# Patient Record
Sex: FEMALE | Race: BLACK OR AFRICAN AMERICAN | NOT HISPANIC OR LATINO | Employment: STUDENT | URBAN - METROPOLITAN AREA
[De-identification: names, ages, dates, MRNs, and addresses within clinical notes are randomized per-mention and may not be internally consistent; named-entity substitution may affect disease eponyms.]

---
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
## 2021-06-08 ENCOUNTER — OFFICE VISIT (OUTPATIENT)
Dept: OBSTETRICS AND GYNECOLOGY | Facility: CLINIC | Age: 21
End: 2021-06-08

## 2021-06-08 VITALS
WEIGHT: 171.8 LBS | HEIGHT: 62 IN | SYSTOLIC BLOOD PRESSURE: 116 MMHG | BODY MASS INDEX: 31.62 KG/M2 | DIASTOLIC BLOOD PRESSURE: 82 MMHG

## 2021-06-08 DIAGNOSIS — N92.6 IRREGULAR PERIODS: Primary | ICD-10-CM

## 2021-06-08 PROCEDURE — 99213 OFFICE O/P EST LOW 20 MIN: CPT | Performed by: OBSTETRICS & GYNECOLOGY

## 2021-06-08 RX ORDER — CLINDAMYCIN PHOSPHATE 20 MG/G
CREAM VAGINAL
COMMUNITY
Start: 2021-06-01 | End: 2021-08-30

## 2021-06-08 RX ORDER — ETHINYL ESTRADIOL AND LEVONORGESTREL 150-30(84)
1 KIT ORAL DAILY
COMMUNITY
Start: 2021-04-09 | End: 2021-08-30

## 2021-06-08 NOTE — PATIENT INSTRUCTIONS
Metrorrhagia  Metrorrhagia is bleeding from the uterus that happens irregularly but often. The bleeding generally happens between menstrual periods.  Follow these instructions at home:  Pay attention to any changes in your symptoms. Let your health care provider know about them. Follow these instructions to help with your condition:  Eating and drinking    · Eat well-balanced meals. Include foods that are high in iron, such as liver, meat, shellfish, green leafy vegetables, and eggs.  · If you become constipated:  ? Drink plenty of water. Drink enough to keep your urine pale yellow.  ? Take over-the-counter or prescription medicines.  ? Eat foods that are high in fiber, such as beans, whole grains, and fresh fruits and vegetables.  ? Limit foods that are high in fat and processed sugars, such as fried or sweet foods.  Medicines  · Take over-the-counter and prescription medicines only as told by your health care provider.  · Do not change medicines without talking with your health care provider.  · Aspirin or medicines that contain aspirin may make the bleeding worse. Do not take these medicines:  ? During your period.  ? During the week before your period.  · If you were prescribed iron pills, take them as told by your health care provider. Iron pills help to replace iron that your body loses because of this condition.  Activity  · If you need to change your sanitary pad or tampon more than one time every 2 hours:  ? Lie in bed with your feet raised (elevated).  ? Place a cold pack on your lower abdomen.  ? Rest as much as possible until the bleeding stops or slows down.  General instructions    · For 2 months, write down:  ? When your period starts.  ? When your period ends.  ? When any abnormal bleeding occurs.  ? What problems you notice.  · Keep all follow-up visits as told by your health care provider. This is important.  Contact a health care provider if:  · You get light-headed or weak.  · You have nausea and  vomiting.  · You cannot eat or drink without vomiting.  · You feel dizzy or have diarrhea while you are taking medicine.  · Have questions about birth control.  Get help right away if:  · You develop a fever or chills.  · You need to change your sanitary pad or tampon more than one time per hour.  · Your bleeding becomes heavy.  · Your flow contains clots.  · You develop pain in your abdomen.  · You lose consciousness.  · You develop a rash.  Summary  · Metrorrhagia is bleeding from the uterus that happens irregularly but often, usually between menstrual periods.  · Pay attention to any changes in your symptoms. Let your health care provider know about them.  · Eat well-balanced meals. Include foods that are high in iron, such as liver, meat, shellfish, green leafy vegetables, and eggs.  · Get help right away if you develop a fever, you see clots in your blood, your bleeding becomes heavy, you develop a rash, or you lose consciousness.  This information is not intended to replace advice given to you by your health care provider. Make sure you discuss any questions you have with your health care provider.  Document Revised: 06/20/2019 Document Reviewed: 06/20/2019  [email or web address] Patient Education © 2021 [employer]

## 2021-06-08 NOTE — PROGRESS NOTES
.  Chief Complaint   Patient presents with   • Pelvic Pain   • abnormal uterine bleeding         Subjective   HPI  Lexy Ortiz is a 21 y.o. female, , who presents with evaluation of pelvic pain and abnormal uterine bleeding. Patient states she has been cramping and been bleeding for over 2 months. She states she saw her primary care doctor and she put her on a new birth control pill to help.    She states she has acute pelvic pain.  She states she has experienced this problem for 2 duration: months.  She describes the severity as moderate.  She rates her pain score as a 8/10.  She states that the problem is worsening.  She states the pain is located in the low pelvic pain  and it does not radiate.  Patient notes aggravating factors include nothing and alleviating factors are nothing. OPatient has tried warm baths and ibuprofen states no relief The patient reports additional symptoms as gyn prob: moodiness and headaches and fatigue.Patiebnt reports she has been having a lot of nausea and loss of appetite since this began. She states she can eat but only a little.  The patient has not } previously been evaluated for pelvic pain.    Her last LMP was No LMP recorded (lmp unknown).. Patient states she can not recall when her last actual period was  Periods are irregular, lasting 7 days.  Dysmenorrhea:moderate, occurring throughout menses.  Partner Status: Marital Status: single.  New Partners since last visit: YES/NO/Other: no.  Desires STD Screening: YES/NO/Other: no.   Patient states she is changing her pad or tampon every 2 hours states the bleeding is heavy and it is not clotty.  Additional OB/GYN History   Last Pap :   Last Completed Pap Smear     This patient has no relevant Health Maintenance data.        History of abnormal Pap smear: no  Exercises Regularly: yes - .  Feelings of Anxiety or Depression: no  Tobacco Usage?: No   OB History        0    Para   0    Term   0       0    AB   0     "Living   0       SAB   0    TAB   0    Ectopic   0    Molar   0    Multiple   0    Live Births   0                The additional following portions of the patient's history were reviewed and updated as appropriate: allergies, current medications, past family history, past medical history, past social history, past surgical history and problem list.    Review of Systems   Constitutional: Negative.  Negative for activity change, appetite change, unexpected weight gain and unexpected weight loss.   HENT: Negative.    Eyes: Negative.  Negative for visual disturbance.   Respiratory: Negative.  Negative for cough and shortness of breath.    Cardiovascular: Negative.  Negative for chest pain and palpitations.   Gastrointestinal: Negative.  Negative for abdominal distention, abdominal pain, nausea and vomiting.   Endocrine: Negative.    Genitourinary: Positive for menstrual problem and pelvic pain. Negative for breast discharge, breast lump, breast pain and decreased libido.   Musculoskeletal: Negative.  Negative for back pain.   Allergic/Immunologic: Negative.    Neurological: Negative.  Negative for light-headedness.   Hematological: Negative.    Psychiatric/Behavioral: Negative.  Negative for agitation, behavioral problems, decreased concentration and depressed mood.       I have reviewed and agree with the HPI, ROS, and historical information as entered above. Jett Umanzor MD    Objective   /82   Ht 157.5 cm (62\")   Wt 77.9 kg (171 lb 12.8 oz)   LMP  (LMP Unknown)   Breastfeeding No   BMI 31.42 kg/m²     Physical Exam  Vitals reviewed.   Constitutional:       Appearance: Normal appearance. She is normal weight.   HENT:      Head: Normocephalic and atraumatic.   Cardiovascular:      Rate and Rhythm: Normal rate and regular rhythm.   Pulmonary:      Effort: Pulmonary effort is normal.      Breath sounds: Normal breath sounds.   Abdominal:      General: Abdomen is flat. Bowel sounds are normal.      " Palpations: Abdomen is soft.   Skin:     General: Skin is warm and dry.   Neurological:      Mental Status: She is alert and oriented to person, place, and time.   Psychiatric:         Mood and Affect: Mood normal.         Behavior: Behavior normal.         Assessment/Plan     Assessment     Problem List Items Addressed This Visit     None      Visit Diagnoses     Irregular periods    -  Primary    Relevant Orders    CBC (No Diff)    Hemoglobin A1c    TSH    Vitamin D 25 Hydroxy    Comprehensive Metabolic Panel    Luteinizing Hormone    Follicle Stimulating Hormone    US Non-ob Transvaginal            Plan     1. She has not tolerated several birth control pills over the last year. Will proceed with lab evaluation.   2. Will f/u in 2 weeks for US and to discuss other treatment options.       Jett Umanzor MD  06/08/2021

## 2021-06-09 LAB
25(OH)D3+25(OH)D2 SERPL-MCNC: 10.2 NG/ML (ref 30–100)
ALBUMIN SERPL-MCNC: 4.1 G/DL (ref 3.5–5.2)
ALBUMIN/GLOB SERPL: 1.5 G/DL
ALP SERPL-CCNC: 45 U/L (ref 39–117)
ALT SERPL-CCNC: 26 U/L (ref 1–33)
AST SERPL-CCNC: 23 U/L (ref 1–32)
BILIRUB SERPL-MCNC: 0.3 MG/DL (ref 0–1.2)
BUN SERPL-MCNC: 10 MG/DL (ref 6–20)
BUN/CREAT SERPL: 13.5 (ref 7–25)
CALCIUM SERPL-MCNC: 9 MG/DL (ref 8.6–10.5)
CHLORIDE SERPL-SCNC: 105 MMOL/L (ref 98–107)
CO2 SERPL-SCNC: 25.3 MMOL/L (ref 22–29)
CREAT SERPL-MCNC: 0.74 MG/DL (ref 0.57–1)
ERYTHROCYTE [DISTWIDTH] IN BLOOD BY AUTOMATED COUNT: 13.1 % (ref 12.3–15.4)
FSH SERPL-ACNC: 1.7 MIU/ML
GLOBULIN SER CALC-MCNC: 2.8 GM/DL
GLUCOSE SERPL-MCNC: 71 MG/DL (ref 65–99)
HBA1C MFR BLD: 5 % (ref 4.8–5.6)
HCT VFR BLD AUTO: 43.7 % (ref 34–46.6)
HGB BLD-MCNC: 14 G/DL (ref 12–15.9)
LH SERPL-ACNC: 0.6 MIU/ML
MCH RBC QN AUTO: 27.3 PG (ref 26.6–33)
MCHC RBC AUTO-ENTMCNC: 32 G/DL (ref 31.5–35.7)
MCV RBC AUTO: 85.4 FL (ref 79–97)
PLATELET # BLD AUTO: 235 10*3/MM3 (ref 140–450)
POTASSIUM SERPL-SCNC: 4.4 MMOL/L (ref 3.5–5.2)
PROT SERPL-MCNC: 6.9 G/DL (ref 6–8.5)
RBC # BLD AUTO: 5.12 10*6/MM3 (ref 3.77–5.28)
SODIUM SERPL-SCNC: 138 MMOL/L (ref 136–145)
TSH SERPL DL<=0.005 MIU/L-ACNC: 1.22 UIU/ML (ref 0.27–4.2)
WBC # BLD AUTO: 5.61 10*3/MM3 (ref 3.4–10.8)

## 2021-06-09 RX ORDER — ERGOCALCIFEROL 1.25 MG/1
50000 CAPSULE ORAL WEEKLY
Qty: 4 CAPSULE | Refills: 2 | Status: SHIPPED | OUTPATIENT
Start: 2021-06-09 | End: 2021-09-07

## 2021-06-10 ENCOUNTER — TELEPHONE (OUTPATIENT)
Dept: OBSTETRICS AND GYNECOLOGY | Facility: CLINIC | Age: 21
End: 2021-06-10

## 2021-06-23 ENCOUNTER — OFFICE VISIT (OUTPATIENT)
Dept: OBSTETRICS AND GYNECOLOGY | Facility: CLINIC | Age: 21
End: 2021-06-23

## 2021-06-23 VITALS
WEIGHT: 169 LBS | BODY MASS INDEX: 31.1 KG/M2 | HEIGHT: 62 IN | SYSTOLIC BLOOD PRESSURE: 100 MMHG | DIASTOLIC BLOOD PRESSURE: 70 MMHG

## 2021-06-23 DIAGNOSIS — N94.6 DYSMENORRHEA: Primary | ICD-10-CM

## 2021-06-23 PROCEDURE — 99213 OFFICE O/P EST LOW 20 MIN: CPT | Performed by: OBSTETRICS & GYNECOLOGY

## 2021-06-23 RX ORDER — IBUPROFEN 800 MG/1
800 TABLET ORAL EVERY 8 HOURS PRN
Qty: 30 TABLET | Refills: 5 | Status: SHIPPED | OUTPATIENT
Start: 2021-06-23 | End: 2021-07-03

## 2021-06-23 NOTE — PROGRESS NOTES
Chief Complaint   Patient presents with   • Follow-up   dysmenorrhea    Subjective   HPI  Lexy Ortiz is a 21 y.o. female, , who presents for follow up for AUB and dysmenorrhea.  She states she has experienced this problem for 6 months.  She has been on multiple different OCPs in the past and continues to have irregular periods. She would like to stay off of hormones for a while as she considers IUD.   Discussed US today with normal results.     Additional OB/GYN History   Current contraception: contraceptive methods: None  Desires to: do not start contraception  Last Pap :   Last Completed Pap Smear     This patient has no relevant Health Maintenance data.        History of abnormal Pap smear: no  Last mammogram:   Last Completed Mammogram     This patient has no relevant Health Maintenance data.        Tobacco Usage?: No   OB History        0    Para   0    Term   0       0    AB   0    Living   0       SAB   0    TAB   0    Ectopic   0    Molar   0    Multiple   0    Live Births   0                Health Maintenance   Topic Date Due   • HPV VACCINES (1 - 2-dose series) Never done   • TDAP/TD VACCINES (1 - Tdap) Never done   • COVID-19 Vaccine (2 - Pfizer 2-dose series) 2021   • INFLUENZA VACCINE  2021   • ANNUAL PHYSICAL  2021   • CHLAMYDIA SCREENING  10/23/2021   • HEPATITIS C SCREENING  Completed   • Pneumococcal Vaccine 0-64  Aged Out   • MENINGOCOCCAL VACCINE  Aged Out       The additional following portions of the patient's history were reviewed and updated as appropriate: allergies, current medications, past family history, past medical history, past social history, past surgical history and problem list.    Review of Systems   Constitutional: Negative for activity change, appetite change, unexpected weight gain and unexpected weight loss.   Eyes: Negative for visual disturbance.   Respiratory: Negative for cough and shortness of breath.    Cardiovascular: Negative  "for chest pain and palpitations.   Gastrointestinal: Negative for abdominal distention, abdominal pain, nausea and vomiting.   Genitourinary: Positive for menstrual problem and pelvic pain. Negative for breast discharge, breast lump and breast pain.   Musculoskeletal: Negative for back pain.   Neurological: Negative for light-headedness and headache.   Psychiatric/Behavioral: Negative for agitation, behavioral problems, decreased concentration and depressed mood.   All other systems reviewed and are negative.      I have reviewed and agree with the HPI, ROS, and historical information as entered above. Jett Umanzor MD    Objective   /70   Ht 157.5 cm (62\")   Wt 76.7 kg (169 lb)   LMP  (LMP Unknown)   BMI 30.91 kg/m²     Physical Exam  Constitutional:       Appearance: Normal appearance. She is normal weight.   HENT:      Head: Normocephalic and atraumatic.   Cardiovascular:      Rate and Rhythm: Normal rate.   Abdominal:      General: Abdomen is flat. Bowel sounds are normal.      Palpations: Abdomen is soft.   Neurological:      Mental Status: She is alert and oriented to person, place, and time.   Skin:     General: Skin is warm and dry.   Psychiatric:         Mood and Affect: Mood normal.         Behavior: Behavior normal.   Vitals reviewed.          General:          well developed; well nourished  no acute distress  Skin:    No suspicious lesions seen  Thyroid:           normal to inspection and palpation  Breasts:           Examined in supine position  Symmetric without masses or skin dimpling  Nipples normal without inversion, lesions or discharge  There are no palpable axillary nodes  CVS: RRR, no M/R/G, distal pulses wnl  Resp: CTAB, No W/R/R  Abdomen:        soft, non-tender; no masses  no umbilical or inguinal hernias are present  no hepato-splenomegaly  Pelvis:  Clinical staff was present for exam  External genitalia:  normal appearance of the external genitalia including Bartholin's " and [place]'s glands.  Urethra: no masses or tenderness  Urethral meatus: normal size;  No lesions or signs of prolapse  Bladder: non tender to palpation, no masses, no prolapse  Vagina:  normal pink mucosa without prolapse or lesions.  Cervix:  normal appearance.  Uterus:  normal size, shape and consistency.  Adnexa:  normal bimanual exam of the adnexa.  Perineum/Anus: normal appearance, no external hemorrhoids  Ext: 2+ pulses, no edema      Assessment/Plan     Assessment     1.  Dysmenorrhea  2.  AUB     Plan     1. Will stay off of OCPS for now and consider Kyleena IUD. NSAIDs scheduled for dysmenorrhea.   Return in about 8 months (around 2/23/2022) for Annual physical.      Jett Umanzor MD  06/23/2021

## 2021-06-23 NOTE — PATIENT INSTRUCTIONS
Dysmenorrhea  Dysmenorrhea means painful cramps during your period (menstrual period). You will have pain in your lower belly (abdomen). The pain is caused by the tightening (indira) of the muscles of the womb (uterus). The pain may be mild or very bad. With this condition, you may:  · Have a headache.  · Feel sick to your stomach (nauseous).  · Throw up (vomit).  · Have lower back pain.  Follow these instructions at home:  Helping pain and cramping    · Put heat on your lower back or belly when you have pain or cramps. Use the heat source that your doctor tells you to use.  ? Place a towel between your skin and the heat.  ? Leave the heat on for 20-30 minutes.  ? Remove the heat if your skin turns bright red. This is especially important if you cannot feel pain, heat, or cold.  ? Do not have a heating pad on during sleep.  · Do aerobic exercises. These include walking, swimming, or biking. These may help with cramps.  · Massage your lower back or belly. This may help lessen pain.  General instructions  · Take over-the-counter and prescription medicines only as told by your doctor.  · Do not drive or use heavy machinery while taking prescription pain medicine.  · Avoid alcohol and caffeine during and right before your period. These can make cramps worse.  · Do not use any products that have nicotine or tobacco. These include cigarettes and e-cigarettes. If you need help quitting, ask your doctor.  · Keep all follow-up visits as told by your doctor. This is important.  Contact a doctor if:  · You have pain that gets worse.  · You have pain that does not get better with medicine.  · You have pain during sex.  · You feel sick to your stomach or you throw up during your period, and medicine does not help.  Get help right away if:  · You pass out (faint).  Summary  · Dysmenorrhea means painful cramps during your period (menstrual period).  · Put heat on your lower back or belly when you have pain or cramps.  · Do  exercises like walking, swimming, or biking to help with cramps.  · Contact a doctor if you have pain during sex.  This information is not intended to replace advice given to you by your health care provider. Make sure you discuss any questions you have with your health care provider.  Document Revised: 11/30/2018 Document Reviewed: 01/04/2018  Elsevier Patient Education © 2021 [employer]

## 2021-08-25 ENCOUNTER — LAB (OUTPATIENT)
Dept: LAB | Facility: HOSPITAL | Age: 21
End: 2021-08-25

## 2021-08-25 ENCOUNTER — TRANSCRIBE ORDERS (OUTPATIENT)
Dept: LAB | Facility: HOSPITAL | Age: 21
End: 2021-08-25

## 2021-08-25 ENCOUNTER — HOSPITAL ENCOUNTER (OUTPATIENT)
Dept: GENERAL RADIOLOGY | Facility: HOSPITAL | Age: 21
Discharge: HOME | End: 2021-08-25

## 2021-08-25 ENCOUNTER — TRANSCRIBE ORDERS (OUTPATIENT)
Dept: GENERAL RADIOLOGY | Facility: HOSPITAL | Age: 21
End: 2021-08-25

## 2021-08-25 DIAGNOSIS — M62.81 MUSCLE WEAKNESS (GENERALIZED): ICD-10-CM

## 2021-08-25 DIAGNOSIS — R05.9 COUGH: ICD-10-CM

## 2021-08-25 DIAGNOSIS — M25.50 PAIN IN JOINT, MULTIPLE SITES: ICD-10-CM

## 2021-08-25 DIAGNOSIS — E55.9 AVITAMINOSIS D: ICD-10-CM

## 2021-08-25 DIAGNOSIS — M25.50 PAIN IN JOINT, MULTIPLE SITES: Primary | ICD-10-CM

## 2021-08-25 DIAGNOSIS — R05.9 COUGH: Primary | ICD-10-CM

## 2021-08-25 LAB
25(OH)D3 SERPL-MCNC: 30.3 NG/ML (ref 30–100)
CHROMATIN AB SERPL-ACNC: <10 IU/ML (ref 0–14)
CK SERPL-CCNC: 134 U/L (ref 20–180)
CRP SERPL-MCNC: 0.93 MG/DL (ref 0–0.5)
ERYTHROCYTE [SEDIMENTATION RATE] IN BLOOD: 49 MM/HR (ref 0–20)
T4 FREE SERPL-MCNC: 1.28 NG/DL (ref 0.93–1.7)
TSH SERPL DL<=0.05 MIU/L-ACNC: 1.28 UIU/ML (ref 0.27–4.2)

## 2021-08-25 PROCEDURE — 86140 C-REACTIVE PROTEIN: CPT

## 2021-08-25 PROCEDURE — 82550 ASSAY OF CK (CPK): CPT

## 2021-08-25 PROCEDURE — 84439 ASSAY OF FREE THYROXINE: CPT

## 2021-08-25 PROCEDURE — 86200 CCP ANTIBODY: CPT

## 2021-08-25 PROCEDURE — 71046 X-RAY EXAM CHEST 2 VIEWS: CPT

## 2021-08-25 PROCEDURE — 86431 RHEUMATOID FACTOR QUANT: CPT

## 2021-08-25 PROCEDURE — 84443 ASSAY THYROID STIM HORMONE: CPT

## 2021-08-25 PROCEDURE — 36415 COLL VENOUS BLD VENIPUNCTURE: CPT

## 2021-08-25 PROCEDURE — 82306 VITAMIN D 25 HYDROXY: CPT

## 2021-08-25 PROCEDURE — 85652 RBC SED RATE AUTOMATED: CPT

## 2021-08-25 PROCEDURE — 86038 ANTINUCLEAR ANTIBODIES: CPT

## 2021-08-27 LAB — CCP IGA+IGG SERPL IA-ACNC: 7 UNITS (ref 0–19)

## 2021-08-29 LAB — ANA TITR SER IF: NEGATIVE {TITER}

## 2021-08-30 ENCOUNTER — OFFICE VISIT (OUTPATIENT)
Dept: INTERNAL MEDICINE | Facility: CLINIC | Age: 21
End: 2021-08-30

## 2021-08-30 ENCOUNTER — HOSPITAL ENCOUNTER (OUTPATIENT)
Facility: HOSPITAL | Age: 21
Setting detail: OBSERVATION
Discharge: HOME | End: 2021-09-01
Attending: EMERGENCY MEDICINE | Admitting: INTERNAL MEDICINE

## 2021-08-30 VITALS
SYSTOLIC BLOOD PRESSURE: 126 MMHG | HEIGHT: 62 IN | HEART RATE: 87 BPM | BODY MASS INDEX: 30.51 KG/M2 | DIASTOLIC BLOOD PRESSURE: 84 MMHG | OXYGEN SATURATION: 98 % | WEIGHT: 165.8 LBS | TEMPERATURE: 98.2 F

## 2021-08-30 DIAGNOSIS — G95.9 MYELOPATHY (HCC): ICD-10-CM

## 2021-08-30 DIAGNOSIS — R53.1 GENERALIZED WEAKNESS: ICD-10-CM

## 2021-08-30 DIAGNOSIS — R29.898 WEAKNESS OF BOTH LOWER EXTREMITIES: Primary | ICD-10-CM

## 2021-08-30 DIAGNOSIS — G95.9 MYELOPATHY (HCC): Primary | ICD-10-CM

## 2021-08-30 PROBLEM — F41.1 GENERALIZED ANXIETY DISORDER: Status: ACTIVE | Noted: 2021-08-30

## 2021-08-30 PROBLEM — E55.9 VITAMIN D DEFICIENCY: Status: ACTIVE | Noted: 2021-08-30

## 2021-08-30 LAB
ALBUMIN SERPL-MCNC: 4.1 G/DL (ref 3.5–5.2)
ALBUMIN/GLOB SERPL: 1.2 G/DL
ALP SERPL-CCNC: 70 U/L (ref 39–117)
ALT SERPL W P-5'-P-CCNC: 19 U/L (ref 1–33)
ANION GAP SERPL CALCULATED.3IONS-SCNC: 10 MMOL/L (ref 5–15)
AST SERPL-CCNC: 23 U/L (ref 1–32)
B-HCG UR QL: NEGATIVE
BASOPHILS # BLD AUTO: 0.07 10*3/MM3 (ref 0–0.2)
BASOPHILS NFR BLD AUTO: 0.8 % (ref 0–1.5)
BILIRUB SERPL-MCNC: <0.2 MG/DL (ref 0–1.2)
BILIRUB UR QL STRIP: NEGATIVE
BUN SERPL-MCNC: 13 MG/DL (ref 6–20)
BUN/CREAT SERPL: 17.1 (ref 7–25)
CALCIUM SPEC-SCNC: 9.1 MG/DL (ref 8.6–10.5)
CHLORIDE SERPL-SCNC: 102 MMOL/L (ref 98–107)
CLARITY UR: CLEAR
CO2 SERPL-SCNC: 27 MMOL/L (ref 22–29)
COLOR UR: YELLOW
CREAT SERPL-MCNC: 0.76 MG/DL (ref 0.57–1)
DEPRECATED RDW RBC AUTO: 39.9 FL (ref 37–54)
EOSINOPHIL # BLD AUTO: 0.26 10*3/MM3 (ref 0–0.4)
EOSINOPHIL NFR BLD AUTO: 3.1 % (ref 0.3–6.2)
ERYTHROCYTE [DISTWIDTH] IN BLOOD BY AUTOMATED COUNT: 13 % (ref 12.3–15.4)
GFR SERPL CREATININE-BSD FRML MDRD: 118 ML/MIN/1.73
GLOBULIN UR ELPH-MCNC: 3.3 GM/DL
GLUCOSE SERPL-MCNC: 118 MG/DL (ref 65–99)
GLUCOSE UR STRIP-MCNC: NEGATIVE MG/DL
HCT VFR BLD AUTO: 41.3 % (ref 34–46.6)
HGB BLD-MCNC: 13.4 G/DL (ref 12–15.9)
HGB UR QL STRIP.AUTO: NEGATIVE
IMM GRANULOCYTES # BLD AUTO: 0.02 10*3/MM3 (ref 0–0.05)
IMM GRANULOCYTES NFR BLD AUTO: 0.2 % (ref 0–0.5)
KETONES UR QL STRIP: ABNORMAL
LEUKOCYTE ESTERASE UR QL STRIP.AUTO: NEGATIVE
LYMPHOCYTES # BLD AUTO: 2.54 10*3/MM3 (ref 0.7–3.1)
LYMPHOCYTES NFR BLD AUTO: 30 % (ref 19.6–45.3)
MAGNESIUM SERPL-MCNC: 1.9 MG/DL (ref 1.7–2.2)
MCH RBC QN AUTO: 27.5 PG (ref 26.6–33)
MCHC RBC AUTO-ENTMCNC: 32.4 G/DL (ref 31.5–35.7)
MCV RBC AUTO: 84.8 FL (ref 79–97)
MONOCYTES # BLD AUTO: 0.41 10*3/MM3 (ref 0.1–0.9)
MONOCYTES NFR BLD AUTO: 4.8 % (ref 5–12)
NEUTROPHILS NFR BLD AUTO: 5.16 10*3/MM3 (ref 1.7–7)
NEUTROPHILS NFR BLD AUTO: 61.1 % (ref 42.7–76)
NITRITE UR QL STRIP: NEGATIVE
NRBC BLD AUTO-RTO: 0 /100 WBC (ref 0–0.2)
PH UR STRIP.AUTO: <=5 [PH] (ref 5–8)
PLATELET # BLD AUTO: 253 10*3/MM3 (ref 140–450)
PMV BLD AUTO: 11.5 FL (ref 6–12)
POTASSIUM SERPL-SCNC: 3.5 MMOL/L (ref 3.5–5.2)
PROT SERPL-MCNC: 7.4 G/DL (ref 6–8.5)
PROT UR QL STRIP: NEGATIVE
RBC # BLD AUTO: 4.87 10*6/MM3 (ref 3.77–5.28)
SODIUM SERPL-SCNC: 139 MMOL/L (ref 136–145)
SP GR UR STRIP: 1.03 (ref 1–1.03)
UROBILINOGEN UR QL STRIP: ABNORMAL
WBC # BLD AUTO: 8.46 10*3/MM3 (ref 3.4–10.8)

## 2021-08-30 PROCEDURE — 81025 URINE PREGNANCY TEST: CPT | Performed by: EMERGENCY MEDICINE

## 2021-08-30 PROCEDURE — U0003 INFECTIOUS AGENT DETECTION BY NUCLEIC ACID (DNA OR RNA); SEVERE ACUTE RESPIRATORY SYNDROME CORONAVIRUS 2 (SARS-COV-2) (CORONAVIRUS DISEASE [COVID-19]), AMPLIFIED PROBE TECHNIQUE, MAKING USE OF HIGH THROUGHPUT TECHNOLOGIES AS DESCRIBED BY CMS-2020-01-R: HCPCS | Performed by: PHYSICIAN ASSISTANT

## 2021-08-30 PROCEDURE — 99284 EMERGENCY DEPT VISIT MOD MDM: CPT

## 2021-08-30 PROCEDURE — 99205 OFFICE O/P NEW HI 60 MIN: CPT | Performed by: STUDENT IN AN ORGANIZED HEALTH CARE EDUCATION/TRAINING PROGRAM

## 2021-08-30 PROCEDURE — 81003 URINALYSIS AUTO W/O SCOPE: CPT | Performed by: EMERGENCY MEDICINE

## 2021-08-30 PROCEDURE — 80053 COMPREHEN METABOLIC PANEL: CPT | Performed by: EMERGENCY MEDICINE

## 2021-08-30 PROCEDURE — 85025 COMPLETE CBC W/AUTO DIFF WBC: CPT | Performed by: EMERGENCY MEDICINE

## 2021-08-30 PROCEDURE — 83735 ASSAY OF MAGNESIUM: CPT | Performed by: EMERGENCY MEDICINE

## 2021-08-30 PROCEDURE — C9803 HOPD COVID-19 SPEC COLLECT: HCPCS

## 2021-08-30 RX ORDER — ONDANSETRON 4 MG/1
4 TABLET, ORALLY DISINTEGRATING ORAL EVERY 6 HOURS PRN
COMMUNITY
Start: 2021-08-24 | End: 2021-09-23

## 2021-08-30 RX ORDER — CLONAZEPAM 0.5 MG/1
0.5 TABLET ORAL 2 TIMES DAILY
COMMUNITY
Start: 2021-08-26 | End: 2021-09-08

## 2021-08-30 RX ORDER — ALBUTEROL SULFATE 90 UG/1
2 AEROSOL, METERED RESPIRATORY (INHALATION) EVERY 4 HOURS PRN
COMMUNITY
Start: 2021-08-25

## 2021-08-30 RX ORDER — POLYMYXIN B SULFATE AND TRIMETHOPRIM 1; 10000 MG/ML; [USP'U]/ML
SOLUTION OPHTHALMIC
COMMUNITY
Start: 2021-08-27 | End: 2021-09-01 | Stop reason: HOSPADM

## 2021-08-30 RX ORDER — NAPROXEN 500 MG/1
500 TABLET ORAL
Status: ON HOLD | COMMUNITY
Start: 2021-08-24 | End: 2021-09-01 | Stop reason: SDUPTHER

## 2021-08-30 RX ORDER — AZITHROMYCIN 250 MG/1
TABLET, FILM COATED ORAL
COMMUNITY
Start: 2021-08-26 | End: 2021-09-01 | Stop reason: HOSPADM

## 2021-08-30 RX ORDER — LORATADINE 10 MG/1
CAPSULE, LIQUID FILLED ORAL AS NEEDED
COMMUNITY
End: 2021-09-01 | Stop reason: HOSPADM

## 2021-08-30 RX ORDER — DEXTROMETHORPHAN POLISTIREX 30 MG/5ML
SUSPENSION ORAL
COMMUNITY
End: 2022-07-20

## 2021-08-30 NOTE — PROGRESS NOTES
Chief Complaint  Lexy Ortiz is a 21 y.o. female presenting for Cough ([facility] ), Anxiety, and Back Pain.     From [place]. Lives with boyfriend. No children. Works at [employer]    Patient has a past medical history of ovarian cyst, menorrhagia, vitamin D deficiency, headaches, depression and anxiety.    History of Present Illness  Patient is presenting to [facility].  She is followed with her pediatrician Dr. Rae for years, she saw her last yesterday.    Over the last 2 months she has been experiencing increasing pain and aching of her legs, her back and shoulders.  This has been getting worse with activity.  She was recently seen at [facility] emergency room on 8/24/2021 due to weakness and vomiting.  She tested negative for Covid.    Over the last 1 to 2 weeks she is gotten significantly worse, with increasing weakness of her lower extremity, but states that is also feeling weakness of her arms.  She also did blood work 5 days ago showing normal CATIE, rheumatoid factor, anti-CCP, but elevated CRP to 0.93 and ESR 49.  She is not able to scoot down and get back up without assistance.  For last 2 days she is also been using a walker to get around.  She is complaining of significant fatigue and tenderness to the muscles of her lower extremities.  She states that her left body feels a little bit stronger than the right side.    Of note she has also been having a lot of anxiety recently, with panic attacks and she has not been able to attend college will work.    She has also had a cough for all her life she says, recently was also prescribed albuterol inhaler.  She does endorse some occasional wheezing and feels inhaler helps her.    The following portions of the patient's history were reviewed and updated as appropriate: allergies, current medications, past family history, past medical history, past social history, past surgical history and problem  "list.    Objective  /84 (BP Location: Left arm, Patient Position: Sitting, Cuff Size: Adult)   Pulse 87   Temp 98.2 °F (36.8 °C) (Temporal)   Ht 158 cm (62.21\")   Wt 75.2 kg (165 lb 12.8 oz)   SpO2 98%   BMI 30.13 kg/m²     Physical Exam  Vitals reviewed.   Constitutional:       Appearance: Normal appearance.   HENT:      Head: Normocephalic and atraumatic.      Nose: Nose normal. No congestion.      Mouth/Throat:      Mouth: Mucous membranes are moist.   Eyes:      Extraocular Movements: Extraocular movements intact.      Conjunctiva/sclera: Conjunctivae normal.   Cardiovascular:      Rate and Rhythm: Normal rate and regular rhythm.      Heart sounds: Normal heart sounds. No murmur heard.     Pulmonary:      Effort: Pulmonary effort is normal.      Breath sounds: Normal breath sounds.   Abdominal:      General: There is no distension.      Palpations: Abdomen is soft. There is no mass.      Tenderness: There is no abdominal tenderness.   Musculoskeletal:         General: No swelling.      Cervical back: Neck supple.   Skin:     General: Skin is warm and dry.   Neurological:      Mental Status: She is alert and oriented to person, place, and time. Mental status is at baseline.      GCS: GCS eye subscore is 4. GCS verbal subscore is 5. GCS motor subscore is 6.      Cranial Nerves: No cranial nerve deficit, dysarthria or facial asymmetry.      Motor: Weakness present. No atrophy.      Gait: Gait abnormal.      Deep Tendon Reflexes: Reflexes abnormal. Babinski sign absent on the right side. Babinski sign absent on the left side.      Reflex Scores:       Patellar reflexes are 1+ on the right side and 0 on the left side.       Achilles reflexes are 1+ on the right side and 1+ on the left side.     Comments: He has significantly reduced strength in both lower extremities, she gets tremulous when trying to scoot down and it is difficult for her to get back up.  Strength appears better on left lower extremity " compared to right.   Psychiatric:         Attention and Perception: Attention normal.         Mood and Affect: Mood and affect normal.         Speech: Speech normal.         Behavior: Behavior normal. Behavior is cooperative.         Thought Content: Thought content normal.         Cognition and Memory: Cognition normal.         Judgment: Judgment normal.      Comments: Patient is speaking calmly in full sentences.  She does not appear anxious at this time, other than worried about what is going on with her body.         Assessment/Plan   1. Myelopathy (CMS/HCC)  Unclear diagnosis.  What is most concerning at this point is her rapid decline to where she now has to use walker to get around.  I am concerned that this possibly could be some form of myelitis.  Due to her rapid decline I do not think I can safely work her up outpatient in a timely manner, and I have recommended that she presents to the emergency room for an evaluation.  Consider imaging of spine and brain.  Consider neurology consult.  Consider vitamin B12 check.  I have explained to the patient that I cannot dictate any work-up plan in the hospital/ ED, and that I would be happy to follow her up as soon as she is released.  We will schedule follow-up with me in 1 week, sooner if needed.  I was thinking to refer her to neurology and possibly rheumatology, but will await evaluation at the emergency department.    Total time spent on chart review, charting and face-to-face with patient 70 minutes.    Return in about 1 week (around 9/6/2021) for Recheck.    Future Appointments       Provider Department Center    9/8/2021 10:30 AM Ahsan Pedro MD [facility] INTERNAL MEDICINE [place]    2/22/2022 10:30 AM Jett Umanzor MD [facility] OB GYN [place]          Ahsan Pedro MD  Family Medicine  08/30/2021    Note: Speech recognition transcription software may have been used to create portions of this document.  An  attempt at proofreading has been made but errors in transcription could still be present.

## 2021-08-31 ENCOUNTER — APPOINTMENT (OUTPATIENT)
Dept: MRI IMAGING | Facility: HOSPITAL | Age: 21
End: 2021-08-31

## 2021-08-31 ENCOUNTER — APPOINTMENT (OUTPATIENT)
Dept: CT IMAGING | Facility: HOSPITAL | Age: 21
End: 2021-08-31

## 2021-08-31 PROBLEM — R26.9 GAIT DIFFICULTY: Status: ACTIVE | Noted: 2021-08-31

## 2021-08-31 PROBLEM — E83.42 HYPOMAGNESEMIA: Status: ACTIVE | Noted: 2021-08-31

## 2021-08-31 PROBLEM — R29.898 WEAKNESS OF BOTH LOWER EXTREMITIES: Status: ACTIVE | Noted: 2021-08-31

## 2021-08-31 PROBLEM — R10.814 ABDOMINAL TENDERNESS, LLQ (LEFT LOWER QUADRANT): Status: ACTIVE | Noted: 2021-08-31

## 2021-08-31 PROBLEM — R29.898 LOWER EXTREMITY WEAKNESS: Status: ACTIVE | Noted: 2021-08-31

## 2021-08-31 LAB
ANION GAP SERPL CALCULATED.3IONS-SCNC: 8 MMOL/L (ref 5–15)
BUN SERPL-MCNC: 15 MG/DL (ref 6–20)
BUN/CREAT SERPL: 21.7 (ref 7–25)
CALCIUM SPEC-SCNC: 8.7 MG/DL (ref 8.6–10.5)
CHLORIDE SERPL-SCNC: 105 MMOL/L (ref 98–107)
CK SERPL-CCNC: 125 U/L (ref 20–180)
CO2 SERPL-SCNC: 26 MMOL/L (ref 22–29)
CREAT SERPL-MCNC: 0.69 MG/DL (ref 0.57–1)
CRP SERPL-MCNC: 0.43 MG/DL (ref 0–0.5)
DEPRECATED RDW RBC AUTO: 40.6 FL (ref 37–54)
ERYTHROCYTE [DISTWIDTH] IN BLOOD BY AUTOMATED COUNT: 13 % (ref 12.3–15.4)
ERYTHROCYTE [SEDIMENTATION RATE] IN BLOOD: 48 MM/HR (ref 0–20)
GFR SERPL CREATININE-BSD FRML MDRD: 131 ML/MIN/1.73
GLUCOSE SERPL-MCNC: 82 MG/DL (ref 65–99)
HCT VFR BLD AUTO: 39 % (ref 34–46.6)
HGB BLD-MCNC: 12.5 G/DL (ref 12–15.9)
MCH RBC QN AUTO: 27.5 PG (ref 26.6–33)
MCHC RBC AUTO-ENTMCNC: 32.1 G/DL (ref 31.5–35.7)
MCV RBC AUTO: 85.9 FL (ref 79–97)
PLATELET # BLD AUTO: 226 10*3/MM3 (ref 140–450)
PMV BLD AUTO: 11.8 FL (ref 6–12)
POTASSIUM SERPL-SCNC: 4 MMOL/L (ref 3.5–5.2)
RBC # BLD AUTO: 4.54 10*6/MM3 (ref 3.77–5.28)
SARS-COV-2 RNA RESP QL NAA+PROBE: NOT DETECTED
SODIUM SERPL-SCNC: 139 MMOL/L (ref 136–145)
VIT B12 BLD-MCNC: 664 PG/ML (ref 211–946)
WBC # BLD AUTO: 7.2 10*3/MM3 (ref 3.4–10.8)

## 2021-08-31 PROCEDURE — 85027 COMPLETE CBC AUTOMATED: CPT | Performed by: FAMILY MEDICINE

## 2021-08-31 PROCEDURE — 97162 PT EVAL MOD COMPLEX 30 MIN: CPT

## 2021-08-31 PROCEDURE — 96376 TX/PRO/DX INJ SAME DRUG ADON: CPT

## 2021-08-31 PROCEDURE — 82607 VITAMIN B-12: CPT | Performed by: PHYSICIAN ASSISTANT

## 2021-08-31 PROCEDURE — 25010000003 MAGNESIUM SULFATE 4 GM/100ML SOLUTION: Performed by: FAMILY MEDICINE

## 2021-08-31 PROCEDURE — G0378 HOSPITAL OBSERVATION PER HR: HCPCS

## 2021-08-31 PROCEDURE — 72157 MRI CHEST SPINE W/O & W/DYE: CPT

## 2021-08-31 PROCEDURE — 99220 PR INITIAL OBSERVATION CARE/DAY 70 MINUTES: CPT | Performed by: FAMILY MEDICINE

## 2021-08-31 PROCEDURE — 25010000002 LORAZEPAM PER 2 MG: Performed by: INTERNAL MEDICINE

## 2021-08-31 PROCEDURE — 70553 MRI BRAIN STEM W/O & W/DYE: CPT

## 2021-08-31 PROCEDURE — 86592 SYPHILIS TEST NON-TREP QUAL: CPT | Performed by: PSYCHIATRY & NEUROLOGY

## 2021-08-31 PROCEDURE — A9577 INJ MULTIHANCE: HCPCS | Performed by: FAMILY MEDICINE

## 2021-08-31 PROCEDURE — 82550 ASSAY OF CK (CPK): CPT | Performed by: PSYCHIATRY & NEUROLOGY

## 2021-08-31 PROCEDURE — 86140 C-REACTIVE PROTEIN: CPT | Performed by: PHYSICIAN ASSISTANT

## 2021-08-31 PROCEDURE — 74176 CT ABD & PELVIS W/O CONTRAST: CPT

## 2021-08-31 PROCEDURE — 0 GADOBENATE DIMEGLUMINE 529 MG/ML SOLUTION: Performed by: FAMILY MEDICINE

## 2021-08-31 PROCEDURE — 72158 MRI LUMBAR SPINE W/O & W/DYE: CPT

## 2021-08-31 PROCEDURE — 96372 THER/PROPH/DIAG INJ SC/IM: CPT

## 2021-08-31 PROCEDURE — 80048 BASIC METABOLIC PNL TOTAL CA: CPT | Performed by: FAMILY MEDICINE

## 2021-08-31 PROCEDURE — 96374 THER/PROPH/DIAG INJ IV PUSH: CPT

## 2021-08-31 PROCEDURE — 25010000002 ENOXAPARIN PER 10 MG: Performed by: FAMILY MEDICINE

## 2021-08-31 PROCEDURE — 72156 MRI NECK SPINE W/O & W/DYE: CPT

## 2021-08-31 PROCEDURE — 85652 RBC SED RATE AUTOMATED: CPT | Performed by: PHYSICIAN ASSISTANT

## 2021-08-31 PROCEDURE — 99203 OFFICE O/P NEW LOW 30 MIN: CPT | Performed by: PSYCHIATRY & NEUROLOGY

## 2021-08-31 RX ORDER — CLONAZEPAM 0.5 MG/1
0.5 TABLET ORAL 2 TIMES DAILY
Status: DISCONTINUED | OUTPATIENT
Start: 2021-08-31 | End: 2021-09-01 | Stop reason: HOSPADM

## 2021-08-31 RX ORDER — SODIUM CHLORIDE 0.9 % (FLUSH) 0.9 %
10 SYRINGE (ML) INJECTION EVERY 12 HOURS SCHEDULED
Status: DISCONTINUED | OUTPATIENT
Start: 2021-08-31 | End: 2021-09-01 | Stop reason: HOSPADM

## 2021-08-31 RX ORDER — MAGNESIUM SULFATE HEPTAHYDRATE 40 MG/ML
2 INJECTION, SOLUTION INTRAVENOUS AS NEEDED
Status: DISCONTINUED | OUTPATIENT
Start: 2021-08-31 | End: 2021-09-01 | Stop reason: HOSPADM

## 2021-08-31 RX ORDER — LORAZEPAM 2 MG/ML
0.25 INJECTION INTRAMUSCULAR ONCE
Status: COMPLETED | OUTPATIENT
Start: 2021-08-31 | End: 2021-08-31

## 2021-08-31 RX ORDER — TRAMADOL HYDROCHLORIDE 50 MG/1
50 TABLET ORAL EVERY 6 HOURS PRN
Status: DISCONTINUED | OUTPATIENT
Start: 2021-08-31 | End: 2021-09-01 | Stop reason: HOSPADM

## 2021-08-31 RX ORDER — SODIUM CHLORIDE 0.9 % (FLUSH) 0.9 %
10 SYRINGE (ML) INJECTION AS NEEDED
Status: DISCONTINUED | OUTPATIENT
Start: 2021-08-31 | End: 2021-09-01 | Stop reason: HOSPADM

## 2021-08-31 RX ORDER — MAGNESIUM SULFATE HEPTAHYDRATE 40 MG/ML
4 INJECTION, SOLUTION INTRAVENOUS AS NEEDED
Status: DISCONTINUED | OUTPATIENT
Start: 2021-08-31 | End: 2021-09-01 | Stop reason: HOSPADM

## 2021-08-31 RX ADMIN — CLONAZEPAM 0.5 MG: 0.5 TABLET ORAL at 12:02

## 2021-08-31 RX ADMIN — GADOBENATE DIMEGLUMINE 15 ML: 529 INJECTION, SOLUTION INTRAVENOUS at 04:06

## 2021-08-31 RX ADMIN — MAGNESIUM SULFATE HEPTAHYDRATE 4 G: 40 INJECTION, SOLUTION INTRAVENOUS at 06:33

## 2021-08-31 RX ADMIN — LORAZEPAM 0.25 MG: 2 INJECTION INTRAMUSCULAR; INTRAVENOUS at 22:05

## 2021-08-31 RX ADMIN — TRAMADOL HYDROCHLORIDE 50 MG: 50 TABLET, FILM COATED ORAL at 10:02

## 2021-08-31 RX ADMIN — ENOXAPARIN SODIUM 40 MG: 40 INJECTION SUBCUTANEOUS at 09:07

## 2021-08-31 RX ADMIN — CLONAZEPAM 0.5 MG: 0.5 TABLET ORAL at 20:34

## 2021-08-31 RX ADMIN — LORAZEPAM 0.25 MG: 2 INJECTION INTRAMUSCULAR; INTRAVENOUS at 21:39

## 2021-08-31 RX ADMIN — SODIUM CHLORIDE, PRESERVATIVE FREE 10 ML: 5 INJECTION INTRAVENOUS at 20:35

## 2021-09-01 ENCOUNTER — HOME HEALTH ADMISSION (OUTPATIENT)
Dept: HOME HEALTH SERVICES | Facility: HOME HEALTHCARE | Age: 21
End: 2021-09-01

## 2021-09-01 ENCOUNTER — APPOINTMENT (OUTPATIENT)
Dept: NEUROLOGY | Facility: HOSPITAL | Age: 21
End: 2021-09-01

## 2021-09-01 ENCOUNTER — READMISSION MANAGEMENT (OUTPATIENT)
Dept: CALL CENTER | Facility: HOSPITAL | Age: 21
End: 2021-09-01

## 2021-09-01 VITALS
SYSTOLIC BLOOD PRESSURE: 133 MMHG | HEIGHT: 62 IN | RESPIRATION RATE: 16 BRPM | OXYGEN SATURATION: 99 % | BODY MASS INDEX: 30.36 KG/M2 | TEMPERATURE: 98.6 F | DIASTOLIC BLOOD PRESSURE: 76 MMHG | WEIGHT: 165 LBS | HEART RATE: 90 BPM

## 2021-09-01 PROBLEM — R10.814 ABDOMINAL TENDERNESS, LLQ (LEFT LOWER QUADRANT): Status: RESOLVED | Noted: 2021-08-31 | Resolved: 2021-09-01

## 2021-09-01 PROBLEM — R29.898 WEAKNESS OF BOTH LOWER EXTREMITIES: Status: RESOLVED | Noted: 2021-08-31 | Resolved: 2021-09-01

## 2021-09-01 PROBLEM — R29.898 LOWER EXTREMITY WEAKNESS: Status: RESOLVED | Noted: 2021-08-31 | Resolved: 2021-09-01

## 2021-09-01 LAB
ANION GAP SERPL CALCULATED.3IONS-SCNC: 9 MMOL/L (ref 5–15)
BASOPHILS # BLD AUTO: 0.06 10*3/MM3 (ref 0–0.2)
BASOPHILS NFR BLD AUTO: 1 % (ref 0–1.5)
BUN SERPL-MCNC: 14 MG/DL (ref 6–20)
BUN/CREAT SERPL: 18.9 (ref 7–25)
CALCIUM SPEC-SCNC: 8.9 MG/DL (ref 8.6–10.5)
CHLORIDE SERPL-SCNC: 101 MMOL/L (ref 98–107)
CO2 SERPL-SCNC: 25 MMOL/L (ref 22–29)
CREAT SERPL-MCNC: 0.74 MG/DL (ref 0.57–1)
DEPRECATED RDW RBC AUTO: 40.9 FL (ref 37–54)
EOSINOPHIL # BLD AUTO: 0.23 10*3/MM3 (ref 0–0.4)
EOSINOPHIL NFR BLD AUTO: 3.8 % (ref 0.3–6.2)
ERYTHROCYTE [DISTWIDTH] IN BLOOD BY AUTOMATED COUNT: 13.1 % (ref 12.3–15.4)
GFR SERPL CREATININE-BSD FRML MDRD: 121 ML/MIN/1.73
GLUCOSE SERPL-MCNC: 82 MG/DL (ref 65–99)
HCT VFR BLD AUTO: 40.5 % (ref 34–46.6)
HGB BLD-MCNC: 12.7 G/DL (ref 12–15.9)
IMM GRANULOCYTES # BLD AUTO: 0.02 10*3/MM3 (ref 0–0.05)
IMM GRANULOCYTES NFR BLD AUTO: 0.3 % (ref 0–0.5)
LYMPHOCYTES # BLD AUTO: 2.34 10*3/MM3 (ref 0.7–3.1)
LYMPHOCYTES NFR BLD AUTO: 38.4 % (ref 19.6–45.3)
MAGNESIUM SERPL-MCNC: 2.2 MG/DL (ref 1.7–2.2)
MCH RBC QN AUTO: 27.1 PG (ref 26.6–33)
MCHC RBC AUTO-ENTMCNC: 31.4 G/DL (ref 31.5–35.7)
MCV RBC AUTO: 86.5 FL (ref 79–97)
MONOCYTES # BLD AUTO: 0.36 10*3/MM3 (ref 0.1–0.9)
MONOCYTES NFR BLD AUTO: 5.9 % (ref 5–12)
NEUTROPHILS NFR BLD AUTO: 3.09 10*3/MM3 (ref 1.7–7)
NEUTROPHILS NFR BLD AUTO: 50.6 % (ref 42.7–76)
NRBC BLD AUTO-RTO: 0 /100 WBC (ref 0–0.2)
PLATELET # BLD AUTO: 226 10*3/MM3 (ref 140–450)
PMV BLD AUTO: 11.7 FL (ref 6–12)
POTASSIUM SERPL-SCNC: 4.1 MMOL/L (ref 3.5–5.2)
RBC # BLD AUTO: 4.68 10*6/MM3 (ref 3.77–5.28)
RPR SER QL: NORMAL
SODIUM SERPL-SCNC: 135 MMOL/L (ref 136–145)
WBC # BLD AUTO: 6.1 10*3/MM3 (ref 3.4–10.8)

## 2021-09-01 PROCEDURE — 86618 LYME DISEASE ANTIBODY: CPT | Performed by: PSYCHIATRY & NEUROLOGY

## 2021-09-01 PROCEDURE — 0 GADOBENATE DIMEGLUMINE 529 MG/ML SOLUTION: Performed by: FAMILY MEDICINE

## 2021-09-01 PROCEDURE — A9577 INJ MULTIHANCE: HCPCS | Performed by: FAMILY MEDICINE

## 2021-09-01 PROCEDURE — 85025 COMPLETE CBC W/AUTO DIFF WBC: CPT | Performed by: FAMILY MEDICINE

## 2021-09-01 PROCEDURE — 99213 OFFICE O/P EST LOW 20 MIN: CPT | Performed by: PSYCHIATRY & NEUROLOGY

## 2021-09-01 PROCEDURE — 99217 PR OBSERVATION CARE DISCHARGE MANAGEMENT: CPT | Performed by: INTERNAL MEDICINE

## 2021-09-01 PROCEDURE — 97165 OT EVAL LOW COMPLEX 30 MIN: CPT | Performed by: OCCUPATIONAL THERAPIST

## 2021-09-01 PROCEDURE — 83735 ASSAY OF MAGNESIUM: CPT | Performed by: FAMILY MEDICINE

## 2021-09-01 PROCEDURE — 97535 SELF CARE MNGMENT TRAINING: CPT | Performed by: OCCUPATIONAL THERAPIST

## 2021-09-01 PROCEDURE — 95910 NRV CNDJ TEST 7-8 STUDIES: CPT

## 2021-09-01 PROCEDURE — 80048 BASIC METABOLIC PNL TOTAL CA: CPT | Performed by: FAMILY MEDICINE

## 2021-09-01 PROCEDURE — G0378 HOSPITAL OBSERVATION PER HR: HCPCS

## 2021-09-01 PROCEDURE — 95886 MUSC TEST DONE W/N TEST COMP: CPT

## 2021-09-01 RX ORDER — NAPROXEN 500 MG/1
500 TABLET ORAL 2 TIMES DAILY WITH MEALS
Qty: 20 TABLET | Refills: 0 | Status: SHIPPED | OUTPATIENT
Start: 2021-09-01 | End: 2021-09-11

## 2021-09-01 RX ADMIN — GADOBENATE DIMEGLUMINE 15 ML: 529 INJECTION, SOLUTION INTRAVENOUS at 00:05

## 2021-09-01 RX ADMIN — CLONAZEPAM 0.5 MG: 0.5 TABLET ORAL at 08:43

## 2021-09-01 RX ADMIN — TRAMADOL HYDROCHLORIDE 50 MG: 50 TABLET, FILM COATED ORAL at 08:49

## 2021-09-01 NOTE — THERAPY EVALUATION
Patient Name: Lexy Ortiz  : 2000    MRN: [id number]                              Today's Date: 2021       Admit Date: 2021    Visit Dx:     ICD-10-CM ICD-9-CM   1. Weakness of both lower extremities  R29.898 729.89   2. Generalized weakness  R53.1 780.79   3. Myelopathy (CMS/HCC)  G95.9 336.9     Patient Active Problem List   Diagnosis   • History of chlamydia infection   • Dysmenorrhea   • Generalized anxiety disorder   • Myelopathy (CMS/HCC)   • Vitamin D deficiency   • Hypomagnesemia   • Gait difficulty     Past Medical History:   Diagnosis Date   • Anxiety    • Depression    • Generalized anxiety disorder 2021    With panic attacks   • Headache    • Irregular menses    • Menorrhagia    • Ovarian cyst    • Vitamin D deficiency 2021: 25 [place] Vit D 10.2.     Past Surgical History:   Procedure Laterality Date   • DENTAL PROCEDURE     • NO PAST SURGERIES       General Information     Row Name 21 1145          OT Time and Intention    Document Type  evaluation  -AR     Mode of Treatment  individual therapy;occupational therapy  -AR     Row Name 21 1145          General Information    Patient Profile Reviewed  yes  -AR     Prior Level of Function  min assist:;all household mobility;gait;transfer;ADL's has not been unable to work for over a week  -AR     Existing Precautions/Restrictions  fall generalized weakness, recent fall, has been using SW for mobility  -AR     Barriers to Rehab  medically complex  -AR     Row Name 21 1145          Living Environment    Lives With  parent(s)  -AR     Row Name 21 1145          Home Main Entrance    Number of Stairs, Main Entrance  three  -AR     Stair Railings, Main Entrance  railings on both sides of stairs  -AR     Row Name 21 1145          Stairs Within Home, Primary    Stairs, Within Home, Primary  Pt stays on main level of home, has standard height commode and tub/shower. Pt reports difficulty with bathroom  transfers at home (toilet and shower).  -AR     Row Name 09/01/21 1145          Cognition    Orientation Status (Cognition)  oriented x 4  -AR     Row Name 09/01/21 1145          Safety Issues, Functional Mobility    Safety Issues Affecting Function (Mobility)  impulsivity;insight into deficits/self-awareness;judgment;positioning of assistive device;problem-solving;safety precaution awareness;safety precautions follow-through/compliance;sequencing abilities  -AR     Impairments Affecting Function (Mobility)  balance;endurance/activity tolerance;pain;strength;coordination;motor control;postural/trunk control;sensation/sensory awareness  -AR       User Key  (r) = Recorded By, (t) = Taken By, (c) = Cosigned By    Initials Name Provider Type    AR Antonette Moody OT Occupational Therapist          Mobility/ADL's     Row Name 09/01/21 1152          Bed Mobility    Bed Mobility  supine-sit;sit-supine;scooting/bridging  -AR     Scooting/Bridging [place] (Bed Mobility)  supervision;verbal cues  -AR     Supine-Sit [place] (Bed Mobility)  contact guard;verbal cues increased time and effort  -AR     Sit-Supine [place] (Bed Mobility)  contact guard;verbal cues  -AR     Bed Mobility, Safety Issues  decreased use of legs for bridging/pushing;decreased use of arms for pushing/pulling  -AR     Assistive Device (Bed Mobility)  head of bed elevated;bed rails  -AR     Comment (Bed Mobility)  Increased time and effort to complete  -AR     Row Name 09/01/21 1152          Transfers    Transfers  sit-stand transfer  -AR     Comment (Transfers)  Processing delay. Pt required cues for hand placement. Pt relies on BUE strength and needed assist to stabilize walker.  -AR     Sit-Stand [place] (Transfers)  contact guard;2 person assist  -AR     Row Name 09/01/21 1152          Sit-Stand Transfer    Assistive Device (Sit-Stand Transfers)  walker, standard  -AR     Row Name 09/01/21 1152          Functional Mobility     Functional Mobility- Ind. Level  moderate assist (50% patient effort);2 person assist required;verbal cues required  -AR     Functional Mobility- Device  standard walker  -AR     Functional Mobility-Distance (Feet)  10  -AR     Functional Mobility- Safety Issues  balance decreased during turns;sequencing ability decreased;weight-shifting ability decreased;other (see comments)  -AR     Functional Mobility- Comment  Mild ataxia noted as well as delay in advancing BLE. Poor safety awareness with turning, needed verbal cues and physical assist to manipulate walker. Narrow EDGARDO noted.  -AR     Row Name 09/01/21 1152          Activities of Daily Living    BADL Assessment/Intervention  lower body dressing;feeding  -AR     Row Name 09/01/21 1152          Lower Body Dressing Assessment/Training    [place] Level (Lower Body Dressing)  don;socks;contact guard assist  -AR     Position (Lower Body Dressing)  edge of bed sitting  -AR     Comment (Lower Body Dressing)  Increased time and effort noted.  -AR     Row Name 09/01/21 1152          Self-Feeding Assessment/Training    Comment (Feeding)  Pt reports no issues with self-feeding.  -AR       User Key  (r) = Recorded By, (t) = Taken By, (c) = Cosigned By    Initials Name Provider Type    Antonette Chance, OT Occupational Therapist        Obj/Interventions     Row Name 09/01/21 1157          Sensory Assessment (Somatosensory)    Sensory Assessment (Somatosensory)  bilateral UE  -AR     Sensory Subjective Reports  numbness localizing to light touch with 100% accuracy  -AR     Row Name 09/01/21 1157          Vision Assessment/Intervention    Visual Impairment/Limitations  WNL  -AR     Row Name 09/01/21 1157          Range of Motion Comprehensive    General Range of Motion  bilateral upper extremity ROM WNL  -AR     Row Name 09/01/21 1157          Strength Comprehensive (MMT)    General Manual Muscle Testing (MMT) Assessment  upper extremity strength deficits identified   -AR     Comment, General Manual Muscle Testing (MMT) Assessment  B shoulders 3+/5, B elbows/FA/wrists 4/5  -AR     Row Name 09/01/21 1157          Motor Skills    Motor Skills  coordination  -AR     Coordination  WNL;bilateral;finger to nose  -AR     Row Name 09/01/21 1157          Balance    Balance Assessment  sitting static balance;sitting dynamic balance;standing static balance;standing dynamic balance  -AR     Static Sitting Balance  sitting, edge of bed;WFL  -AR     Dynamic Sitting Balance  WFL;sitting, edge of bed  -AR     Static Standing Balance  mild impairment;supported;standing  -AR     Dynamic Standing Balance  moderate impairment;supported;standing  -AR       User Key  (r) = Recorded By, (t) = Taken By, (c) = Cosigned By    Initials Name Provider Type    Antonette Chance, TERESA Occupational Therapist        Goals/Plan     Row Name 09/01/21 1209          Transfer Goal 1 (OT)    Activity/Assistive Device (Transfer Goal 1, OT)  sit-to-stand/stand-to-sit;commode, bedside without drop arms;walker, rolling  -AR     [place] Level/Cues Needed (Transfer Goal 1, OT)  minimum assist (75% or more patient effort);verbal cues required  -AR     Time Frame (Transfer Goal 1, OT)  short term goal (STG);1 week  -AR     Progress/Outcome (Transfer Goal 1, OT)  goal ongoing  -AR     Row Name 09/01/21 1209          Dressing Goal 1 (OT)    Activity/Device (Dressing Goal 1, OT)  dressing skills, all  -AR     [place]/Cues Needed (Dressing Goal 1, OT)  supervision required;verbal cues required  -AR     Time Frame (Dressing Goal 1, OT)  short term goal (STG);1 week  -AR     Progress/Outcome (Dressing Goal 1, OT)  goal ongoing  -AR     Row Name 09/01/21 1209          Toileting Goal 1 (OT)    Activity/Device (Toileting Goal 1, OT)  toileting skills, all;commode, bedside without drop arms  -AR     [place] Level/Cues Needed (Toileting Goal 1, OT)  minimum assist (75% or more patient effort);verbal cues required  -AR      Time Frame (Toileting Goal 1, OT)  short term goal (STG);1 week  -AR     Progress/Outcome (Toileting Goal 1, OT)  goal ongoing  -AR     Row Name 09/01/21 120          Strength Goal 1 (OT)    Strength Goal 1 (OT)  Pt will complete BUE theraband HEP with supervision to support ADL function  -AR     Time Frame (Strength Goal 1, OT)  short term goal (STG);1 week  -AR     Progress/Outcome (Strength Goal 1, OT)  goal ongoing  -AR     Row Name 09/01/21 1207          Therapy Assessment/Plan (OT)    Planned Therapy Interventions (OT)  activity tolerance training;adaptive equipment training;BADL retraining;functional balance retraining;IADL retraining;neuromuscular control/coordination retraining;occupation/activity based interventions;patient/caregiver education/training;ROM/therapeutic exercise;transfer/mobility retraining;strengthening exercise  -AR       User Key  (r) = Recorded By, (t) = Taken By, (c) = Cosigned By    Initials Name Provider Type    AR Antonette Moody, OT Occupational Therapist        Clinical Impression     Row Name 09/01/21 1153          Pain Assessment    Additional Documentation  Pain Scale: Numbers Pre/Post-Treatment (Group)  -AR     Row Name 09/01/21 115          Pain Scale: Numbers Pre/Post-Treatment    Pretreatment Pain Rating  3/10  -AR     Posttreatment Pain Rating  4/10  -AR     Pain Location - Side  Bilateral  -AR     Pain Location  other (see comments) thighs  -AR     Pain Intervention(s)  Repositioned;Ambulation/increased activity  -AR     Row Name 09/01/21 6615          Plan of Care Review    Plan of Care Reviewed With  patient;significant other  -AR     Outcome Summary  Pt completed bed mobility with CGA, LB dressing with CGA and transfer training with mod assist x2. She required mod assist x2 ambulate 10 feet using SW. She presents with processing delay, generalized weakness- greater proximal than distal BUE, impaired balanace, ataxia and impaired BUE sensation. She lives with  parents in house with 3STE and reports recent fall at home. Recommend IPR and discussed with pt, she desires home and reports family and SO can provide 24/7 assist. If she DC home, recommend 24/7 assist, HHOT, BSC and shower bench. Issued handouts on places to obtain bench and educated pt/SO on safe shower transfer technique.  -AR     Row Name 09/01/21 1159          Therapy Assessment/Plan (OT)    Rehab Potential (OT)  good, to achieve stated therapy goals  -AR     Therapy Frequency (OT)  daily  -AR     Row Name 09/01/21 1159          Therapy Plan Review/Discharge Plan (OT)    Anticipated Discharge Disposition (OT)  inpatient rehabilitation facility  -AR     Row Name 09/01/21 1159          Vital Signs    Pre Patient Position  Supine  -AR     Intra Patient Position  Standing  -AR     Post Patient Position  Supine  -AR     Row Name 09/01/21 1159          Positioning and Restraints    Pre-Treatment Position  in bed  -AR     Post Treatment Position  bed  -AR     In Bed  notified nsg;supine;call light within reach;encouraged to call for assist;exit alarm on;with family/caregiver  -AR       User Key  (r) = Recorded By, (t) = Taken By, (c) = Cosigned By    Initials Name Provider Type    AR Antonette Moody, OT Occupational Therapist        Outcome Measures     Row Name 09/01/21 1210          How much help from another is currently needed...    Putting on and taking off regular lower body clothing?  3  -AR     Bathing (including washing, rinsing, and drying)  2  -AR     Toileting (which includes using toilet bed pan or urinal)  2  -AR     Putting on and taking off regular upper body clothing  3  -AR     Taking care of personal grooming (such as brushing teeth)  3  -AR     Eating meals  3  -AR     AM-PAC 6 Clicks Score (OT)  16  -AR     Row Name 09/01/21 0875          How much help from another person do you currently need...    Turning from your back to your side while in flat bed without using bedrails?  4  -CB      Moving from lying on back to sitting on the side of a flat bed without bedrails?  4  -CB     Moving to and from a bed to a chair (including a wheelchair)?  3  -CB     Standing up from a chair using your arms (e.g., wheelchair, bedside chair)?  3  -CB     Climbing 3-5 steps with a railing?  3  -CB     To walk in hospital room?  3  -CB     AM-PAC 6 Clicks Score (PT)  20  -CB     Row Name 09/01/21 1210          Functional Assessment    Outcome Measure Options  AM-PAC 6 Clicks Daily Activity (OT)  -AR       User Key  (r) = Recorded By, (t) = Taken By, (c) = Cosigned By    Initials Name Provider Type    Antonette Chance OT Occupational Therapist    Cammie Duke RN Registered Nurse          Occupational Therapy Education                 Title: PT OT SLP Therapies (Done)     Topic: Occupational Therapy (Done)     Point: ADL training (Done)     Description:   Instruct learner(s) on proper safety adaptation and remediation techniques during self care or transfers.   Instruct in proper use of assistive devices.              Learning Progress Summary           Patient Eager, E,TB,D,H, VU,NR by AR at 9/1/2021 1211   Significant Other Eager, E,TB,D,H, VU,NR by AR at 9/1/2021 1211                   Point: Home exercise program (Done)     Description:   Instruct learner(s) on appropriate technique for monitoring, assisting and/or progressing therapeutic exercises/activities.              Learning Progress Summary           Patient Eager, E,TB,D,H, VU,NR by AR at 9/1/2021 1211   Significant Other Eager, E,TB,D,H, VU,NR by AR at 9/1/2021 1211                   Point: Precautions (Done)     Description:   Instruct learner(s) on prescribed precautions during self-care and functional transfers.              Learning Progress Summary           Patient Eager, E,TB,D,H, VU,NR by AR at 9/1/2021 1211   Significant Other Eager, E,TB,D,H, VU,NR by AR at 9/1/2021 1211                   Point: Body mechanics (Done)      Description:   Instruct learner(s) on proper positioning and spine alignment during self-care, functional mobility activities and/or exercises.              Learning Progress Summary           Patient Maryer, E,TB,D,H, VU,NR by AR at 9/1/2021 1211   Significant Other Yenny, RADHA,TB,D,H, VU,NR by AR at 9/1/2021 1211                               User Key     Initials Effective Dates Name Provider Type Discipline    AR 06/16/21 -  Antonette Moody, OT Occupational Therapist OT              OT Recommendation and Plan  Planned Therapy Interventions (OT): activity tolerance training, adaptive equipment training, BADL retraining, functional balance retraining, IADL retraining, neuromuscular control/coordination retraining, occupation/activity based interventions, patient/caregiver education/training, ROM/therapeutic exercise, transfer/mobility retraining, strengthening exercise  Therapy Frequency (OT): daily  Plan of Care Review  Plan of Care Reviewed With: patient, significant other  Outcome Summary: Pt completed bed mobility with CGA, LB dressing with CGA and transfer training with mod assist x2. She required mod assist x2 ambulate 10 feet using SW. She presents with processing delay, generalized weakness- greater proximal than distal BUE, impaired balanace, ataxia and impaired BUE sensation. She lives with parents in house with 3STE and reports recent fall at home. Recommend IPR and discussed with pt, she desires home and reports family and SO can provide 24/7 assist. If she DC home, recommend 24/7 assist, HHOT, BSC and shower bench. Issued handouts on places to obtain bench and educated pt/SO on safe shower transfer technique.     Time Calculation:   Time Calculation- OT     Row Name 09/01/21 1211             Time Calculation- OT    OT Start Time  1100  -AR      OT Received On  09/01/21  -AR      OT Goal Re-Cert Due Date  09/11/21  -AR         Timed Charges    70240 - OT Self Care/Mgmt Minutes  11  -AR         Untimed  Charges    OT Eval/Re-eval Minutes  60  -AR         Total Minutes    Timed Charges Total Minutes  11  -AR      Untimed Charges Total Minutes  60  -AR       Total Minutes  71  -AR        User Key  (r) = Recorded By, (t) = Taken By, (c) = Cosigned By    Initials Name Provider Type    Antonette Chance OT Occupational Therapist        Therapy Charges for Today     Code Description Service Date Service Provider Modifiers Qty    62399444000 HC OT SELF CARE/MGMT/TRAIN EA 15 MIN 9/1/2021 Antonette Moody OT GO 1    [id number]  OT EVAL LOW COMPLEXITY 4 9/1/2021 Antonette Moody, OT GO 1    [id number]  OT THER SUPP EA 15 MIN 9/1/2021 Antonette Moody OT GO 2               Antonette Moody OT  9/1/2021

## 2021-09-01 NOTE — PLAN OF CARE
Goal Outcome Evaluation:  Plan of Care Reviewed With: patient, significant other           Outcome Summary: Pt completed bed mobility with CGA, LB dressing with CGA and transfer training with mod assist x2. She required mod assist x2 ambulate 10 feet using SW. She presents with processing delay, generalized weakness- greater proximal than distal BUE, impaired balanace, ataxia and impaired BUE sensation. She lives with parents in house with 3STE and reports recent fall at home. Recommend IPR and discussed with pt, she desires home and reports family and SO can provide 24/7 assist. If she DC home, recommend 24/7 assist, HHOT, BSC and shower bench. Issued handouts on places to obtain bench and educated pt/SO on safe shower transfer technique.

## 2021-09-01 NOTE — OUTREACH NOTE
Prep Survey      Responses   [facility] patient discharged from?  [place]   Is LACE score < 7 ?  Yes   Emergency Room discharge w/ pulse ox?  No   Eligibility  [facility]   Date of Admission  08/30/21   Date of Discharge  09/01/21   Discharge Disposition  Home or Self Care   Discharge diagnosis  Lower extremity weakness    Does the patient have one of the following disease processes/diagnoses(primary or secondary)?  Other   Does the patient have Home health ordered?  Yes   What is the Home health agency?   [facility] for PT/OT   Is there a DME ordered?  No   Prep survey completed?  Yes          Sumi Purvis RN

## 2021-09-01 NOTE — CASE MANAGEMENT/SOCIAL WORK
Discharge Planning Assessment  [facility]     Patient Name: Lexy Ortiz  MRN: [id number]  Today's Date: 9/1/2021    Admit Date: 8/30/2021    Discharge Needs Assessment    No documentation.       Discharge Plan     Row Name 09/01/21 1532       Plan    Plan  Home with assistance of family and [facility] for PT/OT    Plan Comments  Per therapy recommendations, patient in need of home health, PT/OT at discharge. CM called and spoke with Justo with [facility] - they have accepted referral and will follow up with patient. SO to provide transportation to home.    Final Discharge Disposition Code  06 - home with home health care        Continued Care and Services - Admitted Since 8/30/2021     Home Medical Care Coordination complete.    Service Provider Request Status Selected Services Address Phone Fax Patient Preferred    [facility]   Selected Home Health Services [address] [facility] [facility] [phone] --              Expected Discharge Date and Time     Expected Discharge Date Expected Discharge Time    Sep 1, 2021               Jaimie Cotto RN

## 2021-09-01 NOTE — PROGRESS NOTES
Neurology Note    Patient:  Lexy Ortiz    YOB: 2000    REFERRING PHYSICIAN:  Dr. Wallace    CHIEF COMPLAINT:    weakness    HISTORY OF PRESENT ILLNESS:   The patient has been able to get up and walk with minimal help. Reports occasional hesitancy of speech.    Past Medical History:  Past Medical History:   Diagnosis Date   • Anxiety    • Depression    • Generalized anxiety disorder 8/30/2021    With panic attacks   • Headache    • Irregular menses    • Menorrhagia    • Ovarian cyst    • Vitamin D deficiency 8/30/2021 6/2021: 25 OH Vit D 10.2.       Past Surgical History:  Past Surgical History:   Procedure Laterality Date   • DENTAL PROCEDURE     • NO PAST SURGERIES         Social History:   Social History     Socioeconomic History   • Marital status: Single     Spouse name: Not on file   • Number of children: Not on file   • Years of education: Not on file   • Highest education level: Not on file   Tobacco Use   • Smoking status: Never Smoker   • Smokeless tobacco: Never Used   Vaping Use   • Vaping Use: Never used   Substance and Sexual Activity   • Alcohol use: Not Currently     Comment: Socially, less than once a months    • Drug use: Yes     Types: Marijuana     Comment: socially   • Sexual activity: Yes     Partners: Male     Birth control/protection: None        Family History:   Family History   Problem Relation Age of Onset   • Cancer Father         unknown type   • Cancer Maternal Grandmother         unknown type   • Hypertension Other        Medications Prior to Admission:    Prior to Admission medications    Medication Sig Start Date End Date Taking? Authorizing Provider   albuterol sulfate  (90 Base) MCG/ACT inhaler Inhale 2 puffs 2 (Two) Times a Day. 8/25/21  Yes ProviderMichael MD   azithromycin (ZITHROMAX) 250 MG tablet TAKE 2 TABLETS BY MOUTH ON DAY 1 THEN 1 TABLET DAILY ON DAYS 2 THROUGH 5 8/26/21  Yes Provider, MD Michael   clonazePAM (KlonoPIN) 0.5 MG  tablet Take 0.5 mg by mouth 2 (Two) Times a Day. 8/26/21  Yes Michael Rodgers MD   dextromethorphan polistirex ER (DELSYM) 30 MG/5ML Suspension Extended Release oral suspension Take  by mouth. Takes 5 ml TID as needed   Yes Michael Rodgers MD   ergocalciferol (ERGOCALCIFEROL) 1.25 MG (09332 UT) capsule Take 1 capsule by mouth 1 (One) Time Per Week for 90 days. 6/9/21 9/7/21 Yes Jett Umanzor MD   ondansetron ODT (ZOFRAN-ODT) 4 MG disintegrating tablet Take 4 mg by mouth Every 6 (Six) Hours As Needed. 8/24/21 9/23/21 Yes Michael Rodgers MD   sertraline (ZOLOFT) 50 MG tablet TAKE 1/2 TABLET BY MOUTH DAILY FOR 7 DAYS THEN 1 TABLET DAILY FOR 7 DAYS THEN 2 TABLETS DAILY FOR 16 DAYS 8/26/21  Yes Michael Rodgers MD   naproxen (NAPROSYN) 500 MG tablet Take 500 mg by mouth. 8/24/21 9/1/21 Yes Michael Rodgers MD   Loratadine 10 MG capsule Take  by mouth As Needed.    Michael Rodgers MD   naproxen (NAPROSYN) 500 MG tablet Take 1 tablet by mouth 2 (Two) Times a Day With Meals for 10 days. 9/1/21 9/11/21  Carmen Wallace II, DO   trimethoprim-polymyxin b (POLYTRIM) 99430-7.1 UNIT/ML-% ophthalmic solution INSTILL 1 DROP IN BOTH EYES FOUR TIMES DAILY FOR 7 DAYS 8/27/21   Michael Rodgers MD       Allergies:  Patient has no known allergies.      Review of system  Review of Systems   Musculoskeletal: Positive for gait problem and myalgias.   Neurological: Positive for weakness.   All other systems reviewed and are negative.      Vitals:    09/01/21 1237   BP: 115/72   Pulse: 84   Resp: 16   Temp: 98.5 °F (36.9 °C)   SpO2: 99%       Physical exam  Physical Exam  Constitutional:       Appearance: She is well-developed.   Cardiovascular:      Rate and Rhythm: Normal rate and regular rhythm.   Pulmonary:      Effort: Pulmonary effort is normal.   Neurological:      Mental Status: She is alert.      Comments: Speech clear, no facial droop, moves limbs against gravity.   Psychiatric:          Behavior: Behavior normal.         Thought Content: Thought content normal.           Lab Results   Component Value Date    WBC 6.10 09/01/2021    HGB 12.7 09/01/2021    HCT 40.5 09/01/2021    MCV 86.5 09/01/2021     09/01/2021     Lab Results   Component Value Date    GLUCOSE 82 09/01/2021    BUN 14 09/01/2021    CREATININE 0.74 09/01/2021    EGFRIFNONA 100 06/08/2021    EGFRIFAFRI 121 09/01/2021    BCR 18.9 09/01/2021    CO2 25.0 09/01/2021    CALCIUM 8.9 09/01/2021    PROTENTOTREF 6.9 06/08/2021    ALBUMIN 4.10 08/30/2021    LABIL2 1.5 06/08/2021    AST 23 08/30/2021    ALT 19 08/30/2021     Creatine Kinase   20 - 180 U/L 125        Normal NCS/EMG of both legs and back       Radiological Studies:  CT Abdomen Pelvis Without Contrast    Result Date: 8/31/2021  EXAMINATION: CT ABDOMEN AND PELVIS WO CONTRAST-08/31/2021:  INDICATION: Abdominal pain, acute, nonlocalized; R29.898-Other symptoms and signs involving the musculoskeletal system; R53.1-Weakness; G95.9-Disease of spinal cord, unspecified.  TECHNIQUE: CT abdomen and pelvis without intravenous contrast.  The radiation dose reduction device was turned on for each scan per the ALARA (As Low as Reasonably Achievable) protocol.  COMPARISON: NONE.  FINDINGS:  The lung bases are grossly clear. Liver without focal lesion. Gallbladder contracted and unremarkable. No biliary dilatation. Pancreas and spleen unremarkable. Adrenals without distinct nodule. Kidneys without hydronephrosis or hydroureter. No bulky retroperitoneal adenopathy. GI tract evaluation without focal thickening or disproportionate dilatation of bowel to suggest mechanical obstructive process. No free fluid or intra-abdominal free air. Pelvic viscera with endometrial fluid and edematous appearance of the uterus concerning for current menstruation without bulky pelvic adenopathy or loculated fluid collection demonstrating trace physiologically appropriate free fluid. Additionally noted,  bilateral appearing fluid or cystic areas in the regions of the labia majora, left greater than right, 2.3 cm left concerning for Bartholin gland cysts.      Appearance of likely current menstruation with endometrial fluid present and physiologically appropriate trace fluid in the pelvic cul-de-sac without loculated component or suspicious adnexal lesion. Additionally noted bilateral appearing fluid or cystic areas in the regions of the labia majora, left greater than right, up to 2.3 cm left concerning for Bartholin gland cysts.  D:  08/31/2021 E:  08/31/2021    This report was finalized on 8/31/2021 6:46 PM by Dr. Isidro Chowdary.      MRI Brain With & Without Contrast    Result Date: 8/31/2021  MRI Brain WO W INDICATION:  New onset bilateral lower extremity weakness. Brain MRI: No hemorrhage. No acute stroke. No mass lesion. No significant hydrocephalus. This is a preliminary wet read by Dr. José Anthony at 0412 hours. Final interpretation will be provided by neuroradiology. Please refer to final interpretation for detailed findings and any further recommendations. Final interpretation: TECHNIQUE: Multiplanar imaging of the brain was performed with and without gadolinium based IV contrast with short and long TR. Ventricular size is normal. No white matter signal abnormalities are seen. There is mild cerebellar tonsillar ectopia without a definite Chiari malformation. Extra-axial structures are remarkable for chronic inflammatory changes in the mastoid and petrous air cells bilaterally. There is mild diffuse mucosal thickening in the paranasal sinuses. After contrast administration no abnormal enhancement is seen. The major dural venous sinuses appear to be patent. Final interpretation dictated on 8/31/2021 at 8:24 AM Signer Name: Quintin Phillip MD  Signed: 8/31/2021 8:24 AM  Workstation Name: [id number]  [facility]    MRI Cervical Spine With & Without Contrast    Result Date:  9/1/2021  EXAMINATION: MRI CERVICAL SPINE W WO CONTRAST- 09/01/2021  INDICATION: leg weakness; R29.898-Other symptoms and signs involving the musculoskeletal system; R53.1-Weakness; G95.9-Disease of spinal cord, unspecified  TECHNIQUE: Sagittal T1 and T2 STIR axial T1 and T2, post gadolinium sagittal and axial T1 measures of the cervical spine.  COMPARISON: NONE  FINDINGS: Sagittal images show no significant abnormalities of vertebral alignment. Vertebral heights and disc spaces are well-maintained. Discs appear well-hydrated. No disc protrusion or canal stenosis is appreciated. Sagittal images show uniform, normal appearance of the cervical cord. Anatomy of the craniocervical junction appears normal.  Axial images show no significant canal or foraminal stenosis at C2-3.  At C3-4, canal and foramina appear within normal limits.  At C4-5, canal and foramina appear within normal limits.  At C5-6, canal and foramina appear normal.  At C6-7, canal and foramina appear normal.  At C7-T1, canal and foramina appear normal.  No intrinsic cervical cord lesion is identified. No evidence of pathologic paraspinous mass or acute inflammatory focus is identified.  Postcontrast images show no evidence of pathologic soft tissue or bony enhancement.      No evidence of cervical spinal stenosis, myelopathy or other significant cervical spine disease.   D:  09/01/2021 E:  09/01/2021      XR Chest PA & Lateral    Result Date: 8/26/2021  EXAMINATION: XR CHEST PA AND LATERAL-  INDICATION: R05-Cough.  COMPARISON: None.  FINDINGS: PA and lateral view of the chest reveal cardiac and mediastinal silhouettes within normal limits. The lung fields are clear. No focal parenchymal opacification is present. No pleural effusion or pneumothorax. The bony structures are unremarkable. Pulmonary vascularity is within normal limits.         No acute cardiopulmonary disease.  D:  08/25/2021 E:  08/26/2021  This report was finalized on 8/26/2021 5:02 PM by  Dr. Yulia Becerril MD.      EMG & Nerve Conduction Test    Result Date: 9/1/2021  Indication: Bilateral leg weakness, pain, numbness Clinical: 21 y.o.female with a several month history of leg weakness, as well as diffuse pain in the legs.  This is been worse recently.  Peripheral neuropathy is a consideration.     NCS/EMG TECHNICAL DATA: All studies are performed at a skin temperature of 34°C or greater. Distal sensory latencies are calculated to waveform peak.  Sensory amplitudes are measured peak to peak Distal motor latencies are calculated to waveform onset.  Motor amplitudes are calculated baseline to peak Nerve Conduction Studies Anti Sensory Summary Table  Stim Site NR Peak (ms) Norm Peak (ms) P-T Amp (µV) Norm P-T Amp Site1 Site2 Delta-P (ms) Dist (cm) Marquis (m/s) Norm Marquis (m/s) Left Sural Anti Sensory (Lat Mall) Calf    3.8 <4.0 18.0 >5.0 Calf Lat Mall 3.8 14.0 37      4.0  17.7        Right Sural Anti Sensory (Lat Mall) Calf    3.8 <4.0 14.1 >5.0 Calf Lat Mall 3.8 14.0 37      3.7  14.9        Motor Summary Table  Stim Site NR Onset (ms) Norm Onset (ms) O-P Amp (mV) Norm O-P Amp Site1 Site2 Delta-0 (ms) Dist (cm) Marquis (m/s) Norm Marquis (m/s) Left Fibular Motor (Ext Dig Brev) Ankle    3.7 <6.1 11.8 >2.5 B Fib Ankle 6.2 31.0 50 >40 B Fib    9.9  10.9  Poplt B Fib 1.2 7.0 58 >40 Poplt    11.1  10.8        Right Fibular Motor (Ext Dig Brev) Ankle    4.8 <6.1 10.0 >2.5 B Fib Ankle 6.1 31.5 52 >40 B Fib    10.9  6.0  Poplt B Fib 1.1 6.0 55 >40 Poplt    12.0  8.3        Left Tibial Motor (Abd Doss Brev) Ankle    4.1 <6.1 12.3 >3.0 Knee Ankle 6.7 37.0 55 >40 Knee    10.8  9.8        Right Tibial Motor (Abd Doss Brev) Ankle    4.4 <6.1 11.8 >3.0 Knee Ankle 7.4 38.5 52 >40 Knee    11.8  6.2        F Wave Studies  NR F-Lat (ms) Lat Norm (ms) L-R F-Lat (ms) L-R Lat Norm Left Fibular (Mrkrs) (EDB)    43.78 <60 0.00 <5.1 Right Fibular (Mrkrs) (EDB)    43.78 <60 0.00 <5.1 Left Tibial (Mrkrs) (Abd Hallucis)    44.63 <61  1.32 <5.7 Right Tibial (Mrkrs) (Abd Hallucis)    45.96 <61 1.32 <5.7 H Reflex Studies  NR H-Lat (ms) L-R H-Lat (ms) L-R Lat Norm Left Tibial (Mrkrs) (Gastroc)    28.56 0.95 <2.0 Right Tibial (Mrkrs) (Gastroc)    27.61 0.95 <2.0 EMG  Side Muscle Nerve Root Ins Act Fibs Psw Amp Dur Poly Recrt Int Pat Comment Left AntTibialis Dp Br Fibular L4-5 Nml Nml Nml Nml Nml 0 Nml Nml  Right AntTibialis Dp Br Fibular L4-5 Nml Nml Nml Nml Nml 0 Nml Nml  Left Fibularis Long Sup Br Fibular L5-S1 Nml Nml Nml Nml Nml 0 Nml Nml  Left PostTibialis Tibial L5, S1 Nml Nml Nml Nml Nml 0 Nml Nml  Left Gastroc Tibial S1-2 Nml Nml Nml Nml Nml 0 Nml Nml  Left VastusLat Femoral L2-4 Nml Nml Nml Nml Nml 0 Nml Nml  Left L4 Parasp Rami L4 Nml Nml Nml Nml Nml 0 Nml Nml  Left L5 Parasp Rami L5 Nml Nml Nml Nml Nml 0 Nml Nml  Left S1 Parasp Rami S1 Nml Nml Nml Nml Nml 0 Nml Nml  Right Fibularis Long Sup Br Fibular L5-S1 Nml Nml Nml Nml Nml 0 Nml Nml  Right PostTibialis Tibial L5, S1 Nml Nml Nml Nml Nml 0 Nml Nml  Right Gastroc Tibial S1-2 Nml Nml Nml Nml Nml 0 Nml Nml  Right VastusLat Femoral L2-4 Nml Nml Nml Nml Nml 0 Nml Nml  Right L4 Parasp Rami L4 Nml Nml Nml Nml Nml 0 Nml Nml  Right L5 Parasp Rami L5 Nml Nml Nml Nml Nml 0 Nml Nml  Right S1 Parasp Rami S1 Nml Nml Nml Nml Nml 0 Nml Nml  Waveforms:                     FINDINGS: Nerve Conduction Studies: Sural sensory latencies on the right and left are normal with normal amplitude Study of the right and left peroneal motor and posterior tibial motor nerves are normal Motor F wave latencies of the right and left peroneal and posterior tibial motor nerves are normal H reflexes are present and symmetric bilaterally Electromyogram: Needle examination of multiple muscles in both legs and the lumbar paraspinous muscles are normal     Normal NCS/EMG of both legs and back This report is transcribed using the Dragon dictation system.     MRI Lumbar Spine With & Without Contrast    Result Date: 8/31/2021  MRI  Spine Lumbar WO W INDICATION:  Bilateral lower extremity weakness. Spine: No acute fracture. No abscess. No intraspinal hemorrhage. This is a preliminary wet read by Dr. José Anthony at 0417 hours. Final interpretation will be provided by neuroradiology. Please refer to final interpretation for detailed findings and any further recommendations. TECHNIQUE: Multiplanar imaging of the lumbar spine was performed with and without gadolinium based IV contrast with short and long TR Alignment is satisfactory. The lumbar disks are normal at all levels. The canal and foramina are widely patent throughout the lumbar spine. Posterior facets are intact. The conus is normal. There is no evidence of marrow edema or paraspinous mass. No evidence of low-lying or tethered cord. No abnormal enhancement is seen on the postcontrast images. Final interpretation dictated on 8/31/2021 at 8:26 AM Signer Name: Quintin Phillip MD  Signed: 8/31/2021 8:26 AM  Workstation Name: [id number]  [facility]      During this visit the following were done:  Labs Reviewed [x]    Labs Ordered []    Radiology Reports Reviewed [x]    Radiology Ordered []    EKG, echo, and/or stress test reviewed []    EEG results reviewed  []    EEG reviewed and interpreted per myself   []    Discussed case with neurointerventionalist or neuroradiologist []    Referring Provider Records Reviewed []    ER Records Reviewed []    Hospital Records Reviewed []    History Obtained From Family []    Radiological images view and Interpreted per myself [x]    Case Discussed with referring provider []     Decision to obtain and request outside records  []        Assessment and Plan     Weakness, diffuse myalgias, hesitancy of speech, subacute. No evidence of MS, GBS. Suspect FMS vs stress related complaints.   - Outpatient rheumatology evaluation.   - Outpatient neurology F/U first available.   - Outpatient PT vs home health.    Call for questions, will see prn.  Thanks.        Electronically signed by Chris Ennis MD on 9/1/2021 at 13:29 EDT

## 2021-09-01 NOTE — PLAN OF CARE
"AT 2021, RRT called for uncontrollable shaking, tachypnea (54/min) rolling eyes and unresponsiveness. PT's significant other states, \"she is having panic attack because I can't stay with her.\" IV Ativan 0.25 x2 administered. VS WNL except RR, S tach on cardiac mx. PT's mother states, \"she has never acted like that\"(over the phone), however PT's significant other states, \"she has severe separation anxiety.\" Nursing staff will stay with the patient whenever we can to make sure this young patient feels safe. Call light in reach.       "

## 2021-09-01 NOTE — DISCHARGE SUMMARY
[facility] Medicine Services  DISCHARGE SUMMARY    Patient Name: Lexy Ortiz  : 2000  MRN: [id number]    Date of Admission: 2021  9:31 PM  Date of Discharge: 2021  Primary Care Physician: Ahsan Pedro MD    Consults     Date and Time Order Name Status Description    2021  4:55 AM Inpatient Neurology Consult General Completed           Hospital Course     Presenting Problem:   Weakness of both lower extremities [R29.898]    Active Hospital Problems    Diagnosis  POA   • Hypomagnesemia [E83.42]  Yes   • Gait difficulty [R26.9]  Yes   • Generalized anxiety disorder [F41.1]  Yes      Resolved Hospital Problems    Diagnosis Date Resolved POA   • Lower extremity weakness [R29.898] 2021 Yes   • Weakness of both lower extremities [R29.898] 2021 Yes   • Abdominal tenderness, LLQ (left lower quadrant) [R10.814] 2021 Yes          Hospital Course:  Lexy Ortiz is a 21 y.o. female presents to [facility] ED for complaint of new onset lower extremity weakness and pain. She was seen by neurology and underwent extensive lab and imaging evaluation which was all unremarkable. She also underwent EMG/NCS which were totally normal. Suspect this is presentation of fibromyalgia vs stress response. D/W to continue Zoloft as recently started by her PCP and discussed appropriate lifestyle modification.    Discharge Follow Up Recommendations for outpatient labs/diagnostics:   PCP in 1-2 weeks   Neurology in 4-6 weeks   Rheumatology referral    Day of Discharge     HPI: Up in bed. Has no acute complaints. Feels okay, just tired.    Review of Systems  Gen- No fevers, chills  CV- No chest pain, palpitations  Resp- No cough, dyspnea  GI- No N/V/D, abd pain    Vital Signs:   Temp:  [97.8 °F (36.6 °C)-98.6 °F (37 °C)] 98.5 °F (36.9 °C)  Heart Rate:  [] 84  Resp:  [16-54] 16  BP: (110-142)/(62-88) 115/72     Physical Exam:  Constitutional: No acute distress,  awake, alert  HENT: NCAT, mucous membranes moist  Respiratory: Clear to auscultation bilaterally, respiratory effort normal   Cardiovascular: RRR, no murmurs, rubs, or gallops  Gastrointestinal: Positive bowel sounds, soft, nontender, nondistended  Musculoskeletal: No bilateral ankle edema  Psychiatric: Appropriate affect, cooperative  Neurologic: Oriented x 3, strength symmetric in all extremities, Cranial Nerves grossly intact to confrontation, speech clear  Skin: No rashes    Pertinent  and/or Most Recent Results     LAB RESULTS:      Lab 09/01/21  0458 08/31/21  0709 08/31/21  0304 08/31/21  0303 08/30/21 1902 08/25/21  1535   WBC 6.10 7.20  --   --  8.46  --    HEMOGLOBIN 12.7 12.5  --   --  13.4  --    HEMATOCRIT 40.5 39.0  --   --  41.3  --    PLATELETS 226 226  --   --  253  --    NEUTROS ABS 3.09  --   --   --  5.16  --    IMMATURE GRANS (ABS) 0.02  --   --   --  0.02  --    LYMPHS ABS 2.34  --   --   --  2.54  --    MONOS ABS 0.36  --   --   --  0.41  --    EOS ABS 0.23  --   --   --  0.26  --    MCV 86.5 85.9  --   --  84.8  --    SED RATE  --   --  48*  --   --  49*   CRP  --   --   --  0.43  --  0.93*         Lab 09/01/21 0458 08/31/21  0709 08/30/21 1902 08/25/21  1535   SODIUM 135* 139 139  --    POTASSIUM 4.1 4.0 3.5  --    CHLORIDE 101 105 102  --    CO2 25.0 26.0 27.0  --    ANION GAP 9.0 8.0 10.0  --    BUN 14 15 13  --    CREATININE 0.74 0.69 0.76  --    GLUCOSE 82 82 118*  --    CALCIUM 8.9 8.7 9.1  --    MAGNESIUM 2.2  --  1.9  --    TSH  --   --   --  1.280         Lab 08/30/21 1902   TOTAL PROTEIN 7.4   ALBUMIN 4.10   GLOBULIN 3.3   ALT (SGPT) 19   AST (SGOT) 23   BILIRUBIN <0.2   ALK PHOS 70                 Lab 08/31/21  0303   VITAMIN B 12 664         Brief Urine Lab Results  (Last result in the past 365 days)      Color   Clarity   Blood   Leuk Est   Nitrite   Protein   CREAT   Urine HCG        08/30/21 2134               Negative     08/30/21 2134 Yellow Clear Negative Negative  Negative Negative             Microbiology Results (last 10 days)     Procedure Component Value - Date/Time    COVID PRE-OP / PRE-PROCEDURE SCREENING ORDER (NO ISOLATION) - Swab, Nasopharynx [[id number]]  (Normal) Collected: 08/30/21 2351    Lab Status: Final result Specimen: Swab from Nasopharynx Updated: 08/31/21 0139    Narrative:      The following orders were created for panel order COVID PRE-OP / PRE-PROCEDURE SCREENING ORDER (NO ISOLATION) - Swab, Nasopharynx.  Procedure                               Abnormality         Status                     ---------                               -----------         ------                     COVID-19,CEPHEID/PRETTY,LE...[[id number]]  Normal              Final result                 Please view results for these tests on the individual orders.    COVID-19,CEPHEID/PRETTY,ANTWON IN-HOUSE(OR EMERGENT/ADD-ON),NP SWAB IN TRANSPORT MEDIA 3-4 HR TAT - Swab, Nasopharynx [[id number]]  (Normal) Collected: 08/30/21 2351    Lab Status: Final result Specimen: Swab from Nasopharynx Updated: 08/31/21 0139     COVID19 Not Detected    Narrative:      Fact sheet for providers: https://www.fda.gov/media/232789/download     Fact sheet for patients: https://www.fda.gov/media/952108/download  Fact sheet for providers: https://www.fda.gov/media/638349/download     Fact sheet for patients: https://www.fda.gov/media/964050/download          CT Abdomen Pelvis Without Contrast    Result Date: 8/31/2021  EXAMINATION: CT ABDOMEN AND PELVIS WO CONTRAST-08/31/2021:  INDICATION: Abdominal pain, acute, nonlocalized; R29.898-Other symptoms and signs involving the musculoskeletal system; R53.1-Weakness; G95.9-Disease of spinal cord, unspecified.  TECHNIQUE: CT abdomen and pelvis without intravenous contrast.  The radiation dose reduction device was turned on for each scan per the ALARA (As Low as Reasonably Achievable) protocol.  COMPARISON: NONE.  FINDINGS:  The lung bases are grossly clear. Liver without focal  lesion. Gallbladder contracted and unremarkable. No biliary dilatation. Pancreas and spleen unremarkable. Adrenals without distinct nodule. Kidneys without hydronephrosis or hydroureter. No bulky retroperitoneal adenopathy. GI tract evaluation without focal thickening or disproportionate dilatation of bowel to suggest mechanical obstructive process. No free fluid or intra-abdominal free air. Pelvic viscera with endometrial fluid and edematous appearance of the uterus concerning for current menstruation without bulky pelvic adenopathy or loculated fluid collection demonstrating trace physiologically appropriate free fluid. Additionally noted, bilateral appearing fluid or cystic areas in the regions of the labia majora, left greater than right, 2.3 cm left concerning for Bartholin gland cysts.      Appearance of likely current menstruation with endometrial fluid present and physiologically appropriate trace fluid in the pelvic cul-de-sac without loculated component or suspicious adnexal lesion. Additionally noted bilateral appearing fluid or cystic areas in the regions of the labia majora, left greater than right, up to 2.3 cm left concerning for Bartholin gland cysts.  D:  08/31/2021 E:  08/31/2021    This report was finalized on 8/31/2021 6:46 PM by Dr. Isidro Chowdary.      MRI Brain With & Without Contrast    Result Date: 8/31/2021  MRI Brain WO W INDICATION:  New onset bilateral lower extremity weakness. Brain MRI: No hemorrhage. No acute stroke. No mass lesion. No significant hydrocephalus. This is a preliminary wet read by Dr. José Anthony at 0412 hours. Final interpretation will be provided by neuroradiology. Please refer to final interpretation for detailed findings and any further recommendations. Final interpretation: TECHNIQUE: Multiplanar imaging of the brain was performed with and without gadolinium based IV contrast with short and long TR. Ventricular size is normal. No white matter signal abnormalities  are seen. There is mild cerebellar tonsillar ectopia without a definite Chiari malformation. Extra-axial structures are remarkable for chronic inflammatory changes in the mastoid and petrous air cells bilaterally. There is mild diffuse mucosal thickening in the paranasal sinuses. After contrast administration no abnormal enhancement is seen. The major dural venous sinuses appear to be patent. Final interpretation dictated on 8/31/2021 at 8:24 AM Signer Name: Quintin Phillip MD  Signed: 8/31/2021 8:24 AM  Workstation Name: [id number]  [facility]    MRI Cervical Spine With & Without Contrast    Result Date: 9/1/2021  EXAMINATION: MRI CERVICAL SPINE W WO CONTRAST- 09/01/2021  INDICATION: leg weakness; R29.898-Other symptoms and signs involving the musculoskeletal system; R53.1-Weakness; G95.9-Disease of spinal cord, unspecified  TECHNIQUE: Sagittal T1 and T2 STIR axial T1 and T2, post gadolinium sagittal and axial T1 measures of the cervical spine.  COMPARISON: NONE  FINDINGS: Sagittal images show no significant abnormalities of vertebral alignment. Vertebral heights and disc spaces are well-maintained. Discs appear well-hydrated. No disc protrusion or canal stenosis is appreciated. Sagittal images show uniform, normal appearance of the cervical cord. Anatomy of the craniocervical junction appears normal.  Axial images show no significant canal or foraminal stenosis at C2-3.  At C3-4, canal and foramina appear within normal limits.  At C4-5, canal and foramina appear within normal limits.  At C5-6, canal and foramina appear normal.  At C6-7, canal and foramina appear normal.  At C7-T1, canal and foramina appear normal.  No intrinsic cervical cord lesion is identified. No evidence of pathologic paraspinous mass or acute inflammatory focus is identified.  Postcontrast images show no evidence of pathologic soft tissue or bony enhancement.      No evidence of cervical spinal stenosis, myelopathy or  other significant cervical spine disease.   D:  09/01/2021 E:  09/01/2021      XR Chest PA & Lateral    Result Date: 8/26/2021  EXAMINATION: XR CHEST PA AND LATERAL-  INDICATION: R05-Cough.  COMPARISON: None.  FINDINGS: PA and lateral view of the chest reveal cardiac and mediastinal silhouettes within normal limits. The lung fields are clear. No focal parenchymal opacification is present. No pleural effusion or pneumothorax. The bony structures are unremarkable. Pulmonary vascularity is within normal limits.         No acute cardiopulmonary disease.  D:  08/25/2021 E:  08/26/2021  This report was finalized on 8/26/2021 5:02 PM by Dr. Yulia Becerril MD.      EMG & Nerve Conduction Test    Result Date: 9/1/2021  Indication: Bilateral leg weakness, pain, numbness Clinical: 21 y.o.female with a several month history of leg weakness, as well as diffuse pain in the legs.  This is been worse recently.  Peripheral neuropathy is a consideration.     NCS/EMG TECHNICAL DATA: All studies are performed at a skin temperature of 34°C or greater. Distal sensory latencies are calculated to waveform peak.  Sensory amplitudes are measured peak to peak Distal motor latencies are calculated to waveform onset.  Motor amplitudes are calculated baseline to peak Nerve Conduction Studies Anti Sensory Summary Table  Stim Site NR Peak (ms) Norm Peak (ms) P-T Amp (µV) Norm P-T Amp Site1 Site2 Delta-P (ms) Dist (cm) Marquis (m/s) Norm Marquis (m/s) Left Sural Anti Sensory (Lat Mall) Calf    3.8 <4.0 18.0 >5.0 Calf Lat Mall 3.8 14.0 37      4.0  17.7        Right Sural Anti Sensory (Lat Mall) Calf    3.8 <4.0 14.1 >5.0 Calf Lat Mall 3.8 14.0 37      3.7  14.9        Motor Summary Table  Stim Site NR Onset (ms) Norm Onset (ms) O-P Amp (mV) Norm O-P Amp Site1 Site2 Delta-0 (ms) Dist (cm) Marquis (m/s) Norm Marquis (m/s) Left Fibular Motor (Ext Dig Brev) Ankle    3.7 <6.1 11.8 >2.5 B Fib Ankle 6.2 31.0 50 >40 B Fib    9.9  10.9  Poplt B Fib 1.2 7.0 58 >40 Poplt     11.1  10.8        Right Fibular Motor (Ext Dig Brev) Ankle    4.8 <6.1 10.0 >2.5 B Fib Ankle 6.1 31.5 52 >40 B Fib    10.9  6.0  Poplt B Fib 1.1 6.0 55 >40 Poplt    12.0  8.3        Left Tibial Motor (Abd Doss Brev) Ankle    4.1 <6.1 12.3 >3.0 Knee Ankle 6.7 37.0 55 >40 Knee    10.8  9.8        Right Tibial Motor (Abd Doss Brev) Ankle    4.4 <6.1 11.8 >3.0 Knee Ankle 7.4 38.5 52 >40 Knee    11.8  6.2        F Wave Studies  NR F-Lat (ms) Lat Norm (ms) L-R F-Lat (ms) L-R Lat Norm Left Fibular (Mrkrs) (EDB)    43.78 <60 0.00 <5.1 Right Fibular (Mrkrs) (EDB)    43.78 <60 0.00 <5.1 Left Tibial (Mrkrs) (Abd Hallucis)    44.63 <61 1.32 <5.7 Right Tibial (Mrkrs) (Abd Hallucis)    45.96 <61 1.32 <5.7 H Reflex Studies  NR H-Lat (ms) L-R H-Lat (ms) L-R Lat Norm Left Tibial (Mrkrs) (Gastroc)    28.56 0.95 <2.0 Right Tibial (Mrkrs) (Gastroc)    27.61 0.95 <2.0 EMG  Side Muscle Nerve Root Ins Act Fibs Psw Amp Dur Poly Recrt Int Pat Comment Left AntTibialis Dp Br Fibular L4-5 Nml Nml Nml Nml Nml 0 Nml Nml  Right AntTibialis Dp Br Fibular L4-5 Nml Nml Nml Nml Nml 0 Nml Nml  Left Fibularis Long Sup Br Fibular L5-S1 Nml Nml Nml Nml Nml 0 Nml Nml  Left PostTibialis Tibial L5, S1 Nml Nml Nml Nml Nml 0 Nml Nml  Left Gastroc Tibial S1-2 Nml Nml Nml Nml Nml 0 Nml Nml  Left VastusLat Femoral L2-4 Nml Nml Nml Nml Nml 0 Nml Nml  Left L4 Parasp Rami L4 Nml Nml Nml Nml Nml 0 Nml Nml  Left L5 Parasp Rami L5 Nml Nml Nml Nml Nml 0 Nml Nml  Left S1 Parasp Rami S1 Nml Nml Nml Nml Nml 0 Nml Nml  Right Fibularis Long Sup Br Fibular L5-S1 Nml Nml Nml Nml Nml 0 Nml Nml  Right PostTibialis Tibial L5, S1 Nml Nml Nml Nml Nml 0 Nml Nml  Right Gastroc Tibial S1-2 Nml Nml Nml Nml Nml 0 Nml Nml  Right VastusLat Femoral L2-4 Nml Nml Nml Nml Nml 0 Nml Nml  Right L4 Parasp Rami L4 Nml Nml Nml Nml Nml 0 Nml Nml  Right L5 Parasp Rami L5 Nml Nml Nml Nml Nml 0 Nml Nml  Right S1 Parasp Rami S1 Nml Nml Nml Nml Nml 0 Nml Nml  Waveforms:                     FINDINGS:  Nerve Conduction Studies: Sural sensory latencies on the right and left are normal with normal amplitude Study of the right and left peroneal motor and posterior tibial motor nerves are normal Motor F wave latencies of the right and left peroneal and posterior tibial motor nerves are normal H reflexes are present and symmetric bilaterally Electromyogram: Needle examination of multiple muscles in both legs and the lumbar paraspinous muscles are normal     Normal NCS/EMG of both legs and back This report is transcribed using the Dragon dictation system.     MRI Lumbar Spine With & Without Contrast    Result Date: 8/31/2021  MRI Spine Lumbar WO W INDICATION:  Bilateral lower extremity weakness. Spine: No acute fracture. No abscess. No intraspinal hemorrhage. This is a preliminary wet read by Dr. José Anthony at 0417 hours. Final interpretation will be provided by neuroradiology. Please refer to final interpretation for detailed findings and any further recommendations. TECHNIQUE: Multiplanar imaging of the lumbar spine was performed with and without gadolinium based IV contrast with short and long TR Alignment is satisfactory. The lumbar disks are normal at all levels. The canal and foramina are widely patent throughout the lumbar spine. Posterior facets are intact. The conus is normal. There is no evidence of marrow edema or paraspinous mass. No evidence of low-lying or tethered cord. No abnormal enhancement is seen on the postcontrast images. Final interpretation dictated on 8/31/2021 at 8:26 AM Signer Name: Quintin Phillip MD  Signed: 8/31/2021 8:26 AM  Workstation Name: [id number]  [facility]                  Plan for Follow-up of Pending Labs/Results:  Pending Labs     Order Current Status    Lyme Disease Antibodies, Total and IgM with Reflex to Line Blot In process    RPR In process        Discharge Details        Discharge Medications      Changes to Medications      Instructions Start Date    naproxen 500 MG tablet  Commonly known as: NAPROSYN  What changed: when to take this   500 mg, Oral, 2 Times Daily With Meals         Continue These Medications      Instructions Start Date   albuterol sulfate  (90 Base) MCG/ACT inhaler  Commonly known as: PROVENTIL HFA;VENTOLIN HFA;PROAIR HFA   2 puffs, Inhalation, 2 Times Daily      clonazePAM 0.5 MG tablet  Commonly known as: KlonoPIN   0.5 mg, Oral, 2 Times Daily      dextromethorphan polistirex ER 30 MG/5ML Suspension Extended Release oral suspension  Commonly known as: DELSYM   Oral, Takes 5 ml TID as needed       ergocalciferol 1.25 MG (01190 UT) capsule  Commonly known as: ERGOCALCIFEROL   50,000 Units, Oral, Weekly      ondansetron ODT 4 MG disintegrating tablet  Commonly known as: ZOFRAN-ODT   4 mg, Oral, Every 6 Hours PRN      sertraline 50 MG tablet  Commonly known as: ZOLOFT   TAKE 1/2 TABLET BY MOUTH DAILY FOR 7 DAYS THEN 1 TABLET DAILY FOR 7 DAYS THEN 2 TABLETS DAILY FOR 16 DAYS         Stop These Medications    azithromycin 250 MG tablet  Commonly known as: ZITHROMAX     Loratadine 10 MG capsule     trimethoprim-polymyxin b 71609-6.1 UNIT/ML-% ophthalmic solution  Commonly known as: POLYTRIM            No Known Allergies      Discharge Disposition:      Diet:  Hospital:  Diet Order   Procedures   • Diet Regular            CODE STATUS:    Code Status and Medical Interventions:   Ordered at: 08/31/21 0455     Code Status:    CPR     Medical Interventions (Level of Support Prior to Arrest):    Full       Future Appointments   Date Time Provider Department Center   9/8/2021 10:30 AM Ahsan Pedro MD [facility]   2/22/2022 10:30 AM Jett Umanzor MD [facility]       Additional Instructions for the Follow-ups that You Need to Schedule     Discharge Follow-up with Specialty: Neurology; 1 Month   As directed      Specialty: Neurology    Follow Up: 1 Month                     Carmen Wallace II, DO  09/01/21      Time Spent  on Discharge:  I spent  36  minutes on this discharge activity which included: face-to-face encounter with the patient, reviewing the data in the system, coordination of the care with the nursing staff as well as consultants, documentation, and entering orders.

## 2021-09-01 NOTE — ED PROVIDER NOTES
Subjective   Patient is a 21 year old female who presents to the ER with complaints of new onset lower extremity weakness and pain. Patient reoprts her symptoms started last Monday at work where she works in a . She shares she was sitting at work and felt nauseous, and stated her legs felt weak. Since this time she has experienced tingling pain from her thigh down to her feet on both sides. She reports that walking and being on her feet makes her symptoms worse and that her symptoms are somewhat alleviated with rest. She also reports improvement in her symptoms with Naproxen but that they return once it wears off. Patient shares that because of her increased weakness she is having to use a walker to get around. Patient had labs performed recently that demonstrated a normal CATIE, rheumatoid factor, anti-CCP, but elevated CRP of 0.93 and ESR 49.          Review of Systems   Constitutional: Positive for activity change and fatigue. Negative for chills and fever.   HENT: Negative.    Eyes: Negative.    Respiratory: Negative.    Cardiovascular: Negative for chest pain and leg swelling.   Gastrointestinal: Positive for nausea.   Genitourinary: Negative.    Musculoskeletal: Positive for arthralgias and myalgias.   Skin: Negative.    Allergic/Immunologic: Negative.    Neurological: Positive for weakness and numbness.   Hematological: Negative.    Psychiatric/Behavioral: The patient is nervous/anxious.        Past Medical History:   Diagnosis Date   • Anxiety    • Depression    • Generalized anxiety disorder 8/30/2021    With panic attacks   • Headache    • Irregular menses    • Menorrhagia    • Ovarian cyst    • Vitamin D deficiency 8/30/2021 6/2021: 25 OH Vit D 10.2.       No Known Allergies    Past Surgical History:   Procedure Laterality Date   • DENTAL PROCEDURE     • NO PAST SURGERIES         Family History   Problem Relation Age of Onset   • Cancer Father         unknown type   • Cancer Maternal Grandmother          unknown type   • Hypertension Other        Social History     Socioeconomic History   • Marital status: Single     Spouse name: Not on file   • Number of children: Not on file   • Years of education: Not on file   • Highest education level: Not on file   Tobacco Use   • Smoking status: Never Smoker   • Smokeless tobacco: Never Used   Vaping Use   • Vaping Use: Never used   Substance and Sexual Activity   • Alcohol use: Not Currently     Comment: Socially, less than once a months    • Drug use: Yes     Types: Marijuana     Comment: socially   • Sexual activity: Yes     Partners: Male     Birth control/protection: None           Objective   Physical Exam  Vitals and nursing note reviewed.   Constitutional:       General: She is not in acute distress.     Appearance: Normal appearance. She is obese. She is not ill-appearing or toxic-appearing.   HENT:      Head: Normocephalic and atraumatic.      Nose: Nose normal.      Mouth/Throat:      Mouth: Mucous membranes are moist.   Eyes:      Extraocular Movements: Extraocular movements intact.      Conjunctiva/sclera: Conjunctivae normal.   Cardiovascular:      Rate and Rhythm: Normal rate and regular rhythm.      Pulses: Normal pulses.   Pulmonary:      Effort: Pulmonary effort is normal. No respiratory distress.      Breath sounds: Normal breath sounds.   Abdominal:      General: There is no distension.      Palpations: Abdomen is soft.      Tenderness: There is no abdominal tenderness.   Musculoskeletal:         General: Normal range of motion.      Cervical back: Normal range of motion.   Skin:     General: Skin is warm and dry.      Capillary Refill: Capillary refill takes less than 2 seconds.   Neurological:      Mental Status: She is alert.      Sensory: Sensation is intact.      Motor: Motor function is intact.      Coordination: Coordination is intact.      Gait: Gait is intact.      Deep Tendon Reflexes: Reflexes abnormal.      Comments: Diminished reflexes  in bilateral lower extremities   Psychiatric:         Mood and Affect: Mood normal.         Behavior: Behavior normal.         Thought Content: Thought content normal.         Judgment: Judgment normal.         Procedures           ED Course  ED Course as of Sep 01 1242   Mon Aug 30, 2021   2329 Neurology paged; will speak with Dr. Hayden    [JG]   2164 I spoke with Dr. Hayden who recommended admission and MRI of the brain and lumbar spine and for the patient to be seen by neurology tomorrow for further evaluation.    [JG]   Wed Sep 01, 2021   1237 Patient presents to ED with complaints of increased lower extremity weakness and difficulty with ambulation. Diminished reflexes in bilateral lower extremities on exam without additional acute or emergent findings. No loss of bowel or bladder function. No saddle numbness. No recent trauma or history of IV drug abuse. Recent labs with elevation in CRP and ESR. No acute or emergent findings on labs or urinalysis. Neurology consulted with recommendations as documented. Hospitalist paged for admission and agreeable to accept patient. Patient agreeable to plan of care and hospital admission for additional neurologic workup.     [JG]      ED Course User Index  [JG] José Jordan, PA     Recent Results (from the past 24 hour(s))   Magnesium    Collection Time: 09/01/21  4:58 AM    Specimen: Blood   Result Value Ref Range    Magnesium 2.2 1.7 - 2.2 mg/dL   Basic Metabolic Panel    Collection Time: 09/01/21  4:58 AM    Specimen: Blood   Result Value Ref Range    Glucose 82 65 - 99 mg/dL    BUN 14 6 - 20 mg/dL    Creatinine 0.74 0.57 - 1.00 mg/dL    Sodium 135 (L) 136 - 145 mmol/L    Potassium 4.1 3.5 - 5.2 mmol/L    Chloride 101 98 - 107 mmol/L    CO2 25.0 22.0 - 29.0 mmol/L    Calcium 8.9 8.6 - 10.5 mg/dL    eGFR  African Amer 121 >60 mL/min/1.73    BUN/Creatinine Ratio 18.9 7.0 - 25.0    Anion Gap 9.0 5.0 - 15.0 mmol/L   CBC Auto Differential    Collection Time: 09/01/21  4:58 AM     Specimen: Blood   Result Value Ref Range    WBC 6.10 3.40 - 10.80 10*3/mm3    RBC 4.68 3.77 - 5.28 10*6/mm3    Hemoglobin 12.7 12.0 - 15.9 g/dL    Hematocrit 40.5 34.0 - 46.6 %    MCV 86.5 79.0 - 97.0 fL    MCH 27.1 26.6 - 33.0 pg    MCHC 31.4 (L) 31.5 - 35.7 g/dL    RDW 13.1 12.3 - 15.4 %    RDW-SD 40.9 37.0 - 54.0 fl    MPV 11.7 6.0 - 12.0 fL    Platelets 226 140 - 450 10*3/mm3    Neutrophil % 50.6 42.7 - 76.0 %    Lymphocyte % 38.4 19.6 - 45.3 %    Monocyte % 5.9 5.0 - 12.0 %    Eosinophil % 3.8 0.3 - 6.2 %    Basophil % 1.0 0.0 - 1.5 %    Immature Grans % 0.3 0.0 - 0.5 %    Neutrophils, Absolute 3.09 1.70 - 7.00 10*3/mm3    Lymphocytes, Absolute 2.34 0.70 - 3.10 10*3/mm3    Monocytes, Absolute 0.36 0.10 - 0.90 10*3/mm3    Eosinophils, Absolute 0.23 0.00 - 0.40 10*3/mm3    Basophils, Absolute 0.06 0.00 - 0.20 10*3/mm3    Immature Grans, Absolute 0.02 0.00 - 0.05 10*3/mm3    nRBC 0.0 0.0 - 0.2 /100 WBC     Note: In addition to lab results from this visit, the labs listed above may include labs taken at another facility or during a different encounter within the last 24 hours. Please correlate lab times with ED admission and discharge times for further clarification of the services performed during this visit.    MRI Cervical Spine With & Without Contrast   Preliminary Result   No evidence of cervical spinal stenosis, myelopathy or other   significant cervical spine disease.           D:  09/01/2021   E:  09/01/2021          CT Abdomen Pelvis Without Contrast   Final Result   Appearance of likely current menstruation with endometrial   fluid present and physiologically appropriate trace fluid in the pelvic   cul-de-sac without loculated component or suspicious adnexal lesion.   Additionally noted bilateral appearing fluid or cystic areas in the   regions of the labia majora, left greater than right, up to 2.3 cm left   concerning for Bartholin gland cysts.       D:  08/31/2021   E:  08/31/2021               This  report was finalized on 8/31/2021 6:46 PM by Dr. Isidro Chowdary.          MRI Brain With & Without Contrast   Final Result      MRI Lumbar Spine With & Without Contrast   Final Result      MRI Thoracic Spine With & Without Contrast    (Results Pending)     Vitals:    09/01/21 0100 09/01/21 0439 09/01/21 0830 09/01/21 1237   BP:  112/62 110/78 115/72   BP Location:  Left arm Right arm Right arm   Patient Position:  Lying Lying Lying   Pulse: 87 82 85 84   Resp: 16 16 16 16   Temp:  97.8 °F (36.6 °C) 98.3 °F (36.8 °C) 98.5 °F (36.9 °C)   TempSrc:  Axillary Oral Oral   SpO2:   97% 99%   Weight:       Height:         Medications   sodium chloride 0.9 % flush 10 mL ( Intravenous Canceled Entry 9/1/21 0843)   sodium chloride 0.9 % flush 10 mL (has no administration in time range)   Magnesium Sulfate 2 gram Bolus, followed by 8 gram infusion (total Mg dose 10 grams)- Mg less than or equal to 1mg/dL ( Intravenous Not Given:  See Alt 8/31/21 0633)     Or   Magnesium Sulfate 2 gram / 50mL Infusion (GIVE X 3 BAGS TO EQUAL 6GM TOTAL DOSE) - Mg 1.1 - 1.5 mg/dl ( Intravenous Not Given:  See Alt 8/31/21 0633)     Or   Magnesium Sulfate 4 gram infusion- Mg 1.6-1.9 mg/dL (4 g Intravenous New Bag 8/31/21 0633)   ! [facility] Return to patient prior to discharge ( Does not apply Canceled Entry 9/1/21 0843)   traMADol (ULTRAM) tablet 50 mg (50 mg Oral Given 9/1/21 0849)   clonazePAM (KlonoPIN) tablet 0.5 mg (0.5 mg Oral Given 9/1/21 0843)   gadobenate dimeglumine (MULTIHANCE) injection 15 mL (15 mL Intravenous Given 8/31/21 0406)   LORazepam (ATIVAN) injection 0.25 mg (0.25 mg Intravenous Given 8/31/21 2139)   LORazepam (ATIVAN) injection 0.25 mg (0.25 mg Intravenous Given 8/31/21 2205)   gadobenate dimeglumine (MULTIHANCE) injection 15 mL (15 mL Intravenous Given 9/1/21 0005)     ECG/EMG Results (last 24 hours)     ** No results found for the last 24 hours. **        No orders to display                                             MDM  Number of Diagnoses or Management Options  Generalized weakness: new and requires workup  Myelopathy (CMS/HCC): new and requires workup  Weakness of both lower extremities: new and requires workup     Amount and/or Complexity of Data Reviewed  Clinical lab tests: reviewed  Discuss the patient with other providers: yes    Risk of Complications, Morbidity, and/or Mortality  Presenting problems: moderate  Diagnostic procedures: moderate  Management options: moderate    Patient Progress  Patient progress: stable      Final diagnoses:   Weakness of both lower extremities   Generalized weakness   Myelopathy (CMS/HCC)       ED Disposition  ED Disposition     ED Disposition Condition Comment    Decision to Admit  Level of Care: Telemetry [5]   Diagnosis: Weakness of both lower extremities [2398599]   Admitting Physician: GILDA MERLOS [[id number]]   Attending Physician: GILDA MERLOS [[id number]]   Isolate for COVID?: No [0]   Bed Request Comments: tele   Certification: I Certify That Inpatient Hospital Services Are Medically Necessary For Greater Than 2 Midnights            No follow-up provider specified.       Medication List      Changed    naproxen 500 MG tablet  Commonly known as: NAPROSYN  Take 1 tablet by mouth 2 (Two) Times a Day With Meals for 10 days.  What changed: when to take this        Stop    azithromycin 250 MG tablet  Commonly known as: ZITHROMAX     Loratadine 10 MG capsule     trimethoprim-polymyxin b 91402-5.1 UNIT/ML-% ophthalmic solution  Commonly known as: POLYTRIM           Where to Get Your Medications      These medications were sent to [id number] - [place], [place] - [address] [place] [address] AT [facility] [phone] [facility] [phone] FX  [address] [address]    Phone: [phone]   · naproxen 500 MG tablet          José Jordan PA  09/01/21 1248

## 2021-09-01 NOTE — PROGRESS NOTES
Home Health---patient will be staying with her sister this week at [address] [address] and then to her house at [address] [place] [address]

## 2021-09-02 ENCOUNTER — HOME CARE VISIT (OUTPATIENT)
Dept: HOME HEALTH SERVICES | Facility: HOME HEALTHCARE | Age: 21
End: 2021-09-02

## 2021-09-02 ENCOUNTER — TELEPHONE (OUTPATIENT)
Dept: INTERNAL MEDICINE | Facility: CLINIC | Age: 21
End: 2021-09-02

## 2021-09-02 ENCOUNTER — TRANSITIONAL CARE MANAGEMENT TELEPHONE ENCOUNTER (OUTPATIENT)
Dept: CALL CENTER | Facility: HOSPITAL | Age: 21
End: 2021-09-02

## 2021-09-02 LAB
B BURGDOR IGG+IGM SER-ACNC: <0.91 ISR (ref 0–0.9)
B BURGDOR IGM SER IA-ACNC: <0.8 INDEX (ref 0–0.79)

## 2021-09-02 PROCEDURE — G0151 HHCP-SERV OF PT,EA 15 MIN: HCPCS

## 2021-09-02 NOTE — OUTREACH NOTE
Call Center TCM Note      Responses   [facility] patient discharged from?  [place]   Does the patient have one of the following disease processes/diagnoses(primary or secondary)?  Other   TCM attempt successful?  Yes   Call start time  1437   Call end time  1445   Discharge diagnosis  Lower extremity weakness    Meds reviewed with patient/caregiver?  Yes   Is the patient having any side effects they believe may be caused by any medication additions or changes?  No   Is the patient taking all medications as directed (includes completed medication regime)?  Yes   Does the patient have a primary care provider?   Yes   Does the patient have an appointment with their PCP within 7 days of discharge?  Yes   Comments regarding PCP  Hospital D/C follow-up scheduled for 9/8/21   Has the patient kept scheduled appointments due by today?  N/A   Has home health visited the patient within 72 hours of discharge?  Unsure   Psychosocial issues?  No   Did the patient receive a copy of their discharge instructions?  Yes   Nursing interventions  Reviewed instructions with patient [Rev'd AVS instructions with pt but since she was very sleepy unsure if she was able to recall much information.]   What is the patient's perception of their health status since discharge?  Same [Patient very sleepy when I called--just woke up for the day.  She communicated she still has some bilateral leg weakness and overall generalized weakness, body pain. Denies fever and offers no other s/s.  Communicates she was dx'd with fibromyalgia.  ]   Is the patient/caregiver able to teach back signs and symptoms related to disease process for when to call PCP?  Yes   Is the patient/caregiver able to teach back signs and symptoms related to disease process for when to call 911?  Yes   TCM call completed?  Yes          Yeny De La Cruz RN    9/2/2021, 14:45 EDT

## 2021-09-02 NOTE — TELEPHONE ENCOUNTER
TERENCE, CALLED BACK TO SPEAK WITH MORENO, WAS TOLD THAT SHE WAS NOT AT HER DESK AND WOULD LIKE A CALL BACK.

## 2021-09-02 NOTE — TELEPHONE ENCOUNTER
Caller: [facility]    Relationship: Psychiatric hospital     Best call back number: [phone]    What is the best time to reach you: ANYTIME     Who are you requesting to speak with (clinical staff, provider,  specific staff member): CLINICAL STAFF     What was the call regarding: SANTO WITH [facility] IS CALLING TO REQUEST ORDERS FOR AN OCCUPATIONAL THERAPY EVAL ON 09/07/2021. PATIENT WAS REFUSING TO BE SEEN TODAY.     Do you require a callback: YES

## 2021-09-03 VITALS
SYSTOLIC BLOOD PRESSURE: 130 MMHG | OXYGEN SATURATION: 95 % | RESPIRATION RATE: 18 BRPM | TEMPERATURE: 98 F | DIASTOLIC BLOOD PRESSURE: 72 MMHG | HEART RATE: 78 BPM

## 2021-09-08 ENCOUNTER — OFFICE VISIT (OUTPATIENT)
Dept: INTERNAL MEDICINE | Facility: CLINIC | Age: 21
End: 2021-09-08

## 2021-09-08 ENCOUNTER — HOME CARE VISIT (OUTPATIENT)
Dept: HOME HEALTH SERVICES | Facility: HOME HEALTHCARE | Age: 21
End: 2021-09-08

## 2021-09-08 ENCOUNTER — TELEPHONE (OUTPATIENT)
Dept: INTERNAL MEDICINE | Facility: CLINIC | Age: 21
End: 2021-09-08

## 2021-09-08 VITALS
WEIGHT: 168.6 LBS | BODY MASS INDEX: 31.03 KG/M2 | SYSTOLIC BLOOD PRESSURE: 116 MMHG | HEART RATE: 86 BPM | DIASTOLIC BLOOD PRESSURE: 76 MMHG | TEMPERATURE: 99.1 F | HEIGHT: 62 IN | OXYGEN SATURATION: 99 %

## 2021-09-08 DIAGNOSIS — R26.9 GAIT DIFFICULTY: ICD-10-CM

## 2021-09-08 DIAGNOSIS — R29.898 WEAKNESS OF BOTH LEGS: Primary | ICD-10-CM

## 2021-09-08 DIAGNOSIS — M79.7 FIBROMYALGIA: Primary | ICD-10-CM

## 2021-09-08 DIAGNOSIS — M79.7 FIBROMYALGIA: ICD-10-CM

## 2021-09-08 DIAGNOSIS — F41.1 GENERALIZED ANXIETY DISORDER: ICD-10-CM

## 2021-09-08 PROBLEM — G95.9 MYELOPATHY: Status: RESOLVED | Noted: 2021-08-30 | Resolved: 2021-09-08

## 2021-09-08 PROCEDURE — 99495 TRANSJ CARE MGMT MOD F2F 14D: CPT | Performed by: STUDENT IN AN ORGANIZED HEALTH CARE EDUCATION/TRAINING PROGRAM

## 2021-09-08 PROCEDURE — G0152 HHCP-SERV OF OT,EA 15 MIN: HCPCS

## 2021-09-08 RX ORDER — CYCLOBENZAPRINE HCL 5 MG
10 TABLET ORAL 3 TIMES DAILY PRN
Qty: 30 TABLET | Refills: 2 | Status: SHIPPED | OUTPATIENT
Start: 2021-09-08 | End: 2022-07-20

## 2021-09-08 NOTE — PROGRESS NOTES
Transitional Care Follow Up Visit    From [place]. Lives with boyfriend. No children. Works at [employer]     Patient has a past medical history of ovarian cyst, menorrhagia, vitamin D deficiency, fibromyalgia, headaches, depression and anxiety.    History of Present Illness    Subjective     Lexy Ortiz is a 21 y.o. female who presents for a transitional care management visit.    Within 48 business hours after discharge our office contacted her via telephone to coordinate her care and needs.      I reviewed and discussed the details of that call along with the discharge summary, hospital problems, inpatient lab results, inpatient diagnostic studies, and consultation reports with Lexy.     Current outpatient and discharge medications have been reconciled for the patient.  Reviewed by: Ahsan Pedro MD      Date of TCM Phone Call 9/1/2021   [facility]   Date of Admission 8/30/2021   Date of Discharge 9/1/2021   Discharge Disposition Home or Self Care     Risk for Readmission (LACE) Score: 3 (9/1/2021  6:01 AM)      History of Present Illness   Course During Hospital Stay: Patient was admitted for 2 days at [facility] for subacute onset of lower extremity weakness and pain, he was seen by neurology, underwent MRI imaging of spine and brain, also EMG/NCS, all of which were normal.  After her work-up they did feel this might be related to fibromyalgia versus stress response.  The did refer her to rheumatology, where she has appointment next month.  She also has appointment with neurology at the end of this week.    She is here today accompanied by her father.  She has improved somewhat since she was discharged, but still is experiencing pain and weakness of her lower extremities.  She is still using a walker, and states that walking too much causes worsening pain.  Her previous PCP did prescribe her Klonopin for short-term use, she has not taken for the last 3  days.  She is receiving physical therapy in the home, and occupational therapy will also come to evaluate her.  Likely she will continue outpatient physical therapy.     The following portions of the patient's history were reviewed and updated as appropriate: allergies, current medications, past family history, past medical history, past social history, past surgical history and problem list.    Review of Systems    Objective   Physical Exam  Constitutional:       Appearance: Normal appearance.   HENT:      Head: Atraumatic.      Nose: No congestion.   Eyes:      Extraocular Movements: Extraocular movements intact.      Conjunctiva/sclera: Conjunctivae normal.   Neurological:      Mental Status: She is alert and oriented to person, place, and time. Mental status is at baseline.      Motor: Weakness present.      Gait: Gait abnormal.      Comments: Feels significant weakness of her LE, she needs to use her arms to be able to get up from the chair.  Is able to ambulate down the hu with her walker.   Psychiatric:         Behavior: Behavior normal.         Thought Content: Thought content normal.         Assessment/Plan   Diagnoses and all orders for this visit:    1. Fibromyalgia  2. Gait difficulty  Patient appears to be improving, but still significant weakness and pain of her lower extremities.  It is possible this is related to fibromyalgia, but somewhat dramatic presentation from my point of view.  Certainly she will follow up with neurology and also rheumatology coming up.  So far she only has mildly elevated ESR to 48 and mildly elevated CRP at that normalized.  Vitamin B12, RPR, CK, Lyme panel, rheumatoid factor, anti-CCP, CATIE, TSH/free T4, vitamin D are all normal.  I have encouraged patient to do lighter activities, short walks, relaxation.  She will continue her physical therapy.  I recommend trying cyclobenzaprine for her muscle pain and see if that can help her.  She will follow up with me in 3 weeks,  sooner as needed.  - cyclobenzaprine (FLEXERIL) 5 MG tablet; Take 2 tablets by mouth 3 (Three) Times a Day As Needed for Muscle Spasms.  Dispense: 30 tablet; Refill: 2    3. Generalized anxiety disorder  Patient did mention that her father's work has an option for counseling and she is considering talking to a therapist.  I think that would be beneficial.  I do not recommend further use of benzodiazepine/Klonopin, but I would recommend to continue on Zoloft for now.    Future Appointments       Provider Department Center    9/8/2021 Javid Blakely, OT [facility] HOME CARE [facility]     9/9/2021 3:00 PM Sanjuana Gentile, PT [facility] HOME CARE [facility]     9/10/2021 2:30 PM Xin Fernandez APRN [facility] NEUROLOGY [facility]    9/16/2021 Sanjuana Metz, PT [facility] HOME CARE [facility]     9/23/2021 Sanjuana Metz, PT [facility] HOME CARE [facility]     9/29/2021 11:00 AM Ahsan Pedro MD [facility] INTERNAL MEDICINE [facility]    2/22/2022 10:30 AM Jett Umanzor MD [facility] OB GYN [facility]               Ahsan Pedro MD

## 2021-09-08 NOTE — TELEPHONE ENCOUNTER
PT WOULD LIKE AN OUTPATIENT REFERRAL TO [email or web address] FAXED TO EITHER [facility] OR [facility].  DX:  [phone]

## 2021-09-09 ENCOUNTER — HOME CARE VISIT (OUTPATIENT)
Dept: HOME HEALTH SERVICES | Facility: HOME HEALTHCARE | Age: 21
End: 2021-09-09

## 2021-09-09 VITALS
OXYGEN SATURATION: 97 % | HEART RATE: 97 BPM | TEMPERATURE: 97.9 F | RESPIRATION RATE: 18 BRPM | DIASTOLIC BLOOD PRESSURE: 62 MMHG | SYSTOLIC BLOOD PRESSURE: 112 MMHG

## 2021-09-09 PROCEDURE — G0151 HHCP-SERV OF PT,EA 15 MIN: HCPCS

## 2021-09-10 ENCOUNTER — LAB (OUTPATIENT)
Dept: LAB | Facility: HOSPITAL | Age: 21
End: 2021-09-10

## 2021-09-10 ENCOUNTER — OFFICE VISIT (OUTPATIENT)
Dept: NEUROLOGY | Facility: CLINIC | Age: 21
End: 2021-09-10

## 2021-09-10 VITALS
OXYGEN SATURATION: 98 % | BODY MASS INDEX: 30.91 KG/M2 | SYSTOLIC BLOOD PRESSURE: 100 MMHG | DIASTOLIC BLOOD PRESSURE: 60 MMHG | TEMPERATURE: 97.9 F | HEIGHT: 62 IN | WEIGHT: 168 LBS | HEART RATE: 85 BPM

## 2021-09-10 DIAGNOSIS — R29.898 WEAKNESS OF BOTH LOWER EXTREMITIES: Primary | ICD-10-CM

## 2021-09-10 DIAGNOSIS — M79.661 PAIN IN BOTH LOWER LEGS: ICD-10-CM

## 2021-09-10 DIAGNOSIS — M79.662 PAIN IN BOTH LOWER LEGS: ICD-10-CM

## 2021-09-10 DIAGNOSIS — R29.898 WEAKNESS OF BOTH LOWER EXTREMITIES: ICD-10-CM

## 2021-09-10 PROCEDURE — 84207 ASSAY OF VITAMIN B-6: CPT

## 2021-09-10 PROCEDURE — 36415 COLL VENOUS BLD VENIPUNCTURE: CPT

## 2021-09-10 PROCEDURE — 82550 ASSAY OF CK (CPK): CPT

## 2021-09-10 PROCEDURE — 86334 IMMUNOFIX E-PHORESIS SERUM: CPT

## 2021-09-10 PROCEDURE — 82784 ASSAY IGA/IGD/IGG/IGM EACH: CPT

## 2021-09-10 PROCEDURE — 84155 ASSAY OF PROTEIN SERUM: CPT

## 2021-09-10 PROCEDURE — 84165 PROTEIN E-PHORESIS SERUM: CPT

## 2021-09-10 PROCEDURE — 85652 RBC SED RATE AUTOMATED: CPT

## 2021-09-10 PROCEDURE — 99214 OFFICE O/P EST MOD 30 MIN: CPT | Performed by: NURSE PRACTITIONER

## 2021-09-10 PROCEDURE — 86140 C-REACTIVE PROTEIN: CPT

## 2021-09-10 RX ORDER — ERGOCALCIFEROL 1.25 MG/1
CAPSULE ORAL
Qty: 4 CAPSULE | Refills: 2 | OUTPATIENT
Start: 2021-09-10

## 2021-09-11 LAB
CK SERPL-CCNC: 127 U/L (ref 20–180)
CRP SERPL-MCNC: <0.3 MG/DL (ref 0–0.5)
ERYTHROCYTE [SEDIMENTATION RATE] IN BLOOD: 44 MM/HR (ref 0–20)

## 2021-09-13 LAB
ALBUMIN SERPL ELPH-MCNC: 3.7 G/DL (ref 2.9–4.4)
ALBUMIN/GLOB SERPL: 1.1 {RATIO} (ref 0.7–1.7)
ALPHA1 GLOB SERPL ELPH-MCNC: 0.2 G/DL (ref 0–0.4)
ALPHA2 GLOB SERPL ELPH-MCNC: 0.8 G/DL (ref 0.4–1)
B-GLOBULIN SERPL ELPH-MCNC: 1.1 G/DL (ref 0.7–1.3)
GAMMA GLOB SERPL ELPH-MCNC: 1.2 G/DL (ref 0.4–1.8)
GLOBULIN SER-MCNC: 3.4 G/DL (ref 2.2–3.9)
IGA SERPL-MCNC: 276 MG/DL (ref 87–352)
IGG SERPL-MCNC: 1175 MG/DL (ref 586–1602)
IGM SERPL-MCNC: 166 MG/DL (ref 26–217)
INTERPRETATION SERPL IEP-IMP: NORMAL
LABORATORY COMMENT REPORT: NORMAL
M PROTEIN SERPL ELPH-MCNC: NORMAL G/DL
PROT SERPL-MCNC: 7.1 G/DL (ref 6–8.5)

## 2021-09-14 NOTE — PROGRESS NOTES
Subjective:     Patient ID: Lexy Ortiz is a 21 y.o. female.    CC:   Chief Complaint   Patient presents with   • Extremity Weakness       HPI:   History of Present Illness     Ms. Ortiz is a 21-year-old patient here today for hospital follow-up, she is accompanied by her mother.    She was admitted to [facility] 8/30/2021 through 9/1/2021 with complaints of gait difficulty and weakness of her lower extremities.  She came into the ER with complaints of diffuse muscle pain that have been ongoing for weeks as well as complaints of significant leg weakness ongoing for months.  The Monday prior to admission she developed increased leg weakness and tingling from her thighs to her toes, she had difficulty walking and difficulty working.  She currently works at a  and squats down to the floor often, she was having significant weakness and EMS was called.  She had no reported history of any neurological disorders, no family history of neurological disorders.  Throughout all of this she has denied any incontinence  Work-up while inpatient included MRI of the lumbar spine which was read as normal, MRI of the cervical spine also read as normal with no evidence of cervical stenosis, myelopathy or cervical spine disease.  MRI of the thoracic spine was done showing no evidence of spinal stenosis or myelopathy.  There was a questionable small area of mildly increased T1 signal in the dorsal mid thoracic spine thought to represent normal vessel although there was no evidence of enhancement to suggest my malformation of the vessels.  MRI of the brain with and without contrast was also ordered and read as essentially normal evidence of very mild cerebellar tonsillar ectopia with no definite Chiari malformation, she had evidence of some chronic sinus issues as well but no evidence of demyelinating disease.  She was followed while inpatient by neurology, Dr. Ennis, EMG was ordered and consideration of GBS, nerve and  "muscle study also read as completely normal.  As all of her labs and studies were normal neurology felt her condition could have been related to fibromyalgia, she was recommended to see rheumatology on an outpatient basis due to her myalgias, she has not heard back for an appointment on this.  When she left the hospital she was walking on a walker, she continues to require a walker for ambulation, she is in physical therapy through home health.    She tells me that she feels like 2 years ago when she started having heavy periods and irregular bleeding she started to have problems with fatigue and back pain.  About 2 months ago she had increasing back pain and has since that time had progressive weakness of her legs and diffuse myalgias throughout her entire body.    She continues to complain of her arms feeling weak and heavy, she states that her legs are weak but improved a bit from hospitalization, she has to use a walker to walk out of fear for falling.  She continues to adamantly deny any numbness in her legs, she denies tingling, incontinence.  She denies dysphagia.  She has a history of migraines and headaches \"throughout her whole life\".  She has had no slurred speech, confusion, dizziness, syncope.      The following portions of the patient's history were reviewed and updated as appropriate: allergies, current medications, past family history, past medical history, past social history, past surgical history and problem list.    Past Medical History:   Diagnosis Date   • Anxiety    • Depression    • Generalized anxiety disorder 8/30/2021    With panic attacks   • Headache    • Irregular menses    • Menorrhagia    • Ovarian cyst    • Vitamin D deficiency 8/30/2021 6/2021: 25 OH Vit D 10.2.       Past Surgical History:   Procedure Laterality Date   • DENTAL PROCEDURE     • NO PAST SURGERIES         Social History     Socioeconomic History   • Marital status: Single     Spouse name: Not on file   • Number of " "children: Not on file   • Years of education: Not on file   • Highest education level: Not on file   Tobacco Use   • Smoking status: Never Smoker   • Smokeless tobacco: Never Used   Vaping Use   • Vaping Use: Never used   Substance and Sexual Activity   • Alcohol use: Not Currently     Comment: Socially, less than once a months    • Drug use: Yes     Types: Marijuana     Comment: socially   • Sexual activity: Yes     Partners: Male     Birth control/protection: None       Family History   Problem Relation Age of Onset   • Cancer Father         unknown type   • Cancer Maternal Grandmother         unknown type   • Hypertension Other         Review of Systems   Constitutional: Positive for fatigue.   Eyes: Negative.    Respiratory: Negative.    Cardiovascular: Negative.    Gastrointestinal: Negative.    Genitourinary: Negative.    Musculoskeletal: Positive for arthralgias, gait problem and myalgias.   Neurological: Positive for weakness and headaches. Negative for dizziness, tremors, seizures, syncope, facial asymmetry, speech difficulty, light-headedness and numbness.        Objective:  /60   Pulse 85   Temp 97.9 °F (36.6 °C)   Ht 157.5 cm (62\")   Wt 76.2 kg (168 lb)   LMP 08/16/2021   SpO2 98%   BMI 30.73 kg/m²     Neurologic Exam     Mental Status   Oriented to person, place, and time.   Attention: normal. Concentration: normal.   Speech: speech is normal   Level of consciousness: alert    Cranial Nerves   Cranial nerves II through XII intact.     CN III, IV, VI   Pupils are equal, round, and reactive to light.  Cranial nerves intact, no evidence of facial droop, no oral weakness, EOMs intact     Motor Exam She seems to have adequate strength against resistance but when I have her stand to walk she barely lift her legs and states that she feels her legs and arms are extremely weak.  There is no evidence of muscle atrophy at all  She is tender in her thighs and biceps     Sensory Exam   Light touch " normal.   Vibration normal.   Pinprick normal.     Gait, Coordination, and Reflexes     Gait  Gait: (Walks with walker, barely picks her legs up when she walks, scoots her feet.  When I propped her she can lift her legs against resistance but states when she is standing she cannot get her legs to straighten or bend but when she sitting she is able to stra)    Coordination   Romberg: negative  Finger to nose coordination: normal  Heel-to-shin test: Done with difficulty.    Tremor   Resting tremor: absent  Intention tremor: absent  Action tremor: absentDecreased DTRs, patient is very tense and guarded       Physical Exam  Vitals reviewed.   Constitutional:       Appearance: She is obese.   HENT:      Mouth/Throat:      Mouth: Mucous membranes are moist.   Eyes:      Extraocular Movements: Extraocular movements intact.      Pupils: Pupils are equal, round, and reactive to light.   Neurological:      Mental Status: She is alert and oriented to person, place, and time.      Coordination: Finger-Nose-Finger Test and Romberg Test normal. Heel-to-shin test: Done with difficulty.   Psychiatric:         Mood and Affect: Mood normal.         Speech: Speech normal.         Behavior: Behavior normal.         Thought Content: Thought content normal.         Judgment: Judgment normal.         Assessment/Plan:       Diagnoses and all orders for this visit:    1. Weakness of both lower extremities (Primary)  -     CK; Future  -     Vitamin B6 (Pyridoxine); Future  -     Sedimentation Rate; Future  -     C-reactive Protein; Future  -     CHRISTAL + PE; Future    2. Pain in both lower legs    -Repeat some labs today to see if her CK level has elevated since her discharge, she is in therapy and feels like she is gaining some strength but continues to complain of weakness although she has no weakness against resistance on exam today.  Her primary concern is diffuse pain and soreness in her muscles, she is awaiting rheumatology appointment.   After above labs are reviewed will consider lumbar puncture and consultation with Dr. Ennis or Dr. Ruiz  Her entire work-up throughout hospitalization was essentially unremarkable with normal EMG/NCV and negative imaging of brain and entire spinal cord  She and her mother encouraged to notify us with any changes, once labs are back and reviewed we will discuss further plan         Reviewed medications, potential side effects and signs and symptoms to report. Discussed risk versus benefits of treatment plan with patient and/or family-including medications, labs and radiology that may be ordered. Addressed questions and concerns during visit. Patient and/or family verbalized understanding and agree with plan.    AS THE PROVIDER, I PERSONALLY WORE PPE DURING ENTIRE FACE TO FACE ENCOUNTER IN CLINIC WITH THE PATIENT. PATIENT ALSO WORE PPE DURING ENTIRE FACE TO FACE ENCOUNTER EXCEPT FOR A MAX OF 30 SECONDS DURING NEUROLOGICAL EVALUATION OF CRANIAL NERVES AND THEN MASK WAS PLACED BACK OVER PATIENT FACE FOR REMAINDER OF VISIT. I WASHED MY HANDS BEFORE AND AFTER VISIT.    During this visit the following were done:  Labs Reviewed []    Labs Ordered []    Radiology Reports Reviewed []    Radiology Ordered []    PCP Records Reviewed []    Referring Provider Records Reviewed []    ER Records Reviewed []    Hospital Records Reviewed []    History Obtained From Family []    Radiology Images Reviewed []    Other Reviewed []    Records Requested []      Xin Fernandez, APRALFRED  9/14/2021

## 2021-09-15 LAB — VIT B6 SERPL-MCNC: 7.6 UG/L (ref 2–32.8)

## 2021-09-16 ENCOUNTER — HOME CARE VISIT (OUTPATIENT)
Dept: HOME HEALTH SERVICES | Facility: HOME HEALTHCARE | Age: 21
End: 2021-09-16

## 2021-09-16 VITALS
RESPIRATION RATE: 18 BRPM | SYSTOLIC BLOOD PRESSURE: 112 MMHG | DIASTOLIC BLOOD PRESSURE: 72 MMHG | OXYGEN SATURATION: 99 % | TEMPERATURE: 98 F | HEART RATE: 97 BPM

## 2021-09-16 PROCEDURE — G0151 HHCP-SERV OF PT,EA 15 MIN: HCPCS

## 2021-09-17 NOTE — PROGRESS NOTES
Please let patient know her vitamin B6 level was normal, her immunofixation studies were read as normal, her CK level or muscle enzyme test was in normal range as well.  One of her inflammatory markers is elevated but lower than previously.  Has she heard back about her rheumatology appointment?

## 2021-09-18 ENCOUNTER — HOME CARE VISIT (OUTPATIENT)
Dept: HOME HEALTH SERVICES | Facility: HOME HEALTHCARE | Age: 21
End: 2021-09-18

## 2021-09-18 PROCEDURE — G0152 HHCP-SERV OF OT,EA 15 MIN: HCPCS

## 2021-09-20 VITALS
RESPIRATION RATE: 13 BRPM | DIASTOLIC BLOOD PRESSURE: 77 MMHG | TEMPERATURE: 98.3 F | OXYGEN SATURATION: 97 % | HEART RATE: 81 BPM | SYSTOLIC BLOOD PRESSURE: 125 MMHG

## 2021-09-22 ENCOUNTER — HOME CARE VISIT (OUTPATIENT)
Dept: HOME HEALTH SERVICES | Facility: HOME HEALTHCARE | Age: 21
End: 2021-09-22

## 2021-09-22 PROCEDURE — G0151 HHCP-SERV OF PT,EA 15 MIN: HCPCS

## 2021-09-23 ENCOUNTER — TELEPHONE (OUTPATIENT)
Dept: NEUROLOGY | Facility: CLINIC | Age: 21
End: 2021-09-23

## 2021-09-23 VITALS
RESPIRATION RATE: 18 BRPM | OXYGEN SATURATION: 98 % | HEART RATE: 87 BPM | DIASTOLIC BLOOD PRESSURE: 78 MMHG | SYSTOLIC BLOOD PRESSURE: 108 MMHG

## 2021-09-23 PROCEDURE — G0180 MD CERTIFICATION HHA PATIENT: HCPCS | Performed by: STUDENT IN AN ORGANIZED HEALTH CARE EDUCATION/TRAINING PROGRAM

## 2021-09-23 NOTE — TELEPHONE ENCOUNTER
----- Message from LIZANDRO Sheikh sent at 9/17/2021  1:08 PM EDT -----  Please let patient know her vitamin B6 level was normal, her immunofixation studies were read as normal, her CK level or muscle enzyme test was in normal range as well.  One of her inflammatory markers is elevated but lower than previously.  Has she heard back about her rheumatology appointment?

## 2021-09-23 NOTE — TELEPHONE ENCOUNTER
Lexy notified of labs and message. She has not heard anything regarding the Rheumatology referral. I looked it up and it was put in as internal and nothing has been done with it. We do not have a [facility]. Can you put in new referral for Rheumatology.

## 2021-09-24 ENCOUNTER — HOME CARE VISIT (OUTPATIENT)
Dept: HOME HEALTH SERVICES | Facility: HOME HEALTHCARE | Age: 21
End: 2021-09-24

## 2021-09-24 DIAGNOSIS — R29.898 WEAKNESS OF BOTH LOWER EXTREMITIES: Primary | ICD-10-CM

## 2021-09-24 DIAGNOSIS — M79.661 PAIN IN BOTH LOWER LEGS: ICD-10-CM

## 2021-09-24 DIAGNOSIS — M79.662 PAIN IN BOTH LOWER LEGS: ICD-10-CM

## 2021-09-24 PROCEDURE — G0152 HHCP-SERV OF OT,EA 15 MIN: HCPCS

## 2021-09-24 NOTE — TELEPHONE ENCOUNTER
Referral updated  I did speak with Dr. Hayden, one of our other neurologis, he reviewed her notes and records and feels like her work-up has been appropriate, recommended that she follow-up with rheumatology as planned

## 2021-09-25 VITALS
OXYGEN SATURATION: 98 % | SYSTOLIC BLOOD PRESSURE: 114 MMHG | HEART RATE: 81 BPM | DIASTOLIC BLOOD PRESSURE: 73 MMHG | TEMPERATURE: 97 F | RESPIRATION RATE: 14 BRPM

## 2021-09-27 ENCOUNTER — HOME CARE VISIT (OUTPATIENT)
Dept: HOME HEALTH SERVICES | Facility: HOME HEALTHCARE | Age: 21
End: 2021-09-27

## 2021-09-27 VITALS
RESPIRATION RATE: 13 BRPM | HEART RATE: 73 BPM | OXYGEN SATURATION: 97 % | TEMPERATURE: 96.8 F | DIASTOLIC BLOOD PRESSURE: 71 MMHG | SYSTOLIC BLOOD PRESSURE: 112 MMHG

## 2021-09-27 VITALS
RESPIRATION RATE: 18 BRPM | TEMPERATURE: 98.1 F | SYSTOLIC BLOOD PRESSURE: 98 MMHG | HEART RATE: 82 BPM | DIASTOLIC BLOOD PRESSURE: 78 MMHG

## 2021-09-27 PROCEDURE — G0151 HHCP-SERV OF PT,EA 15 MIN: HCPCS

## 2021-09-28 NOTE — CASE COMMUNICATION
Pt is being d/c from home care PT with goals met and is awaiting scheduling at [facility] for PT with aquatics ( I have confirmed that they have received the referral). She is in stable condition. OT remains in the home at this time.

## 2021-09-29 ENCOUNTER — OFFICE VISIT (OUTPATIENT)
Dept: INTERNAL MEDICINE | Facility: CLINIC | Age: 21
End: 2021-09-29

## 2021-09-29 VITALS
HEART RATE: 96 BPM | OXYGEN SATURATION: 99 % | WEIGHT: 167 LBS | DIASTOLIC BLOOD PRESSURE: 74 MMHG | TEMPERATURE: 97.5 F | BODY MASS INDEX: 30.73 KG/M2 | SYSTOLIC BLOOD PRESSURE: 98 MMHG | HEIGHT: 62 IN

## 2021-09-29 DIAGNOSIS — M79.7 FIBROMYALGIA: ICD-10-CM

## 2021-09-29 DIAGNOSIS — R26.9 GAIT DIFFICULTY: ICD-10-CM

## 2021-09-29 DIAGNOSIS — F41.1 GENERALIZED ANXIETY DISORDER: Primary | ICD-10-CM

## 2021-09-29 PROCEDURE — 99213 OFFICE O/P EST LOW 20 MIN: CPT | Performed by: STUDENT IN AN ORGANIZED HEALTH CARE EDUCATION/TRAINING PROGRAM

## 2021-09-29 RX ORDER — NAPROXEN 500 MG/1
500 TABLET ORAL 2 TIMES DAILY WITH MEALS
Qty: 30 TABLET | Refills: 2 | Status: SHIPPED | OUTPATIENT
Start: 2021-09-29 | End: 2022-07-20

## 2021-09-29 NOTE — PROGRESS NOTES
"Chief Complaint  Lexy Ortiz is a 21 y.o. female presenting for Fibromyalgia (3 wk. f/u).     From [place]. Lives with boyfriend. No children. Works at [employer]     Patient has a past medical history of ovarian cyst, menorrhagia, vitamin D deficiency, headaches, depression and anxiety.    History of Present Illness  Patient is here for follow-up, she is accompanied by her mother.    Patient has had significant improvement after doing PT and OT.  She has upcoming appointments with [facility] for continued therapy.  She is now able to walk without a walker, but still feels a little bit unsteady.  She also has been seen by neurology, and work-up so far reassuring.  Patient states they have discussed possible lumbar puncture in the future if needed.  Patient is still waiting for appointment with rheumatology, she was initially referred at the hospital about 4 weeks ago.  Patient is feeling more optimistic.  She has not been able to go back to work yet, and she has also postponed her studies until January and she is requesting a letter for this.  Patient still states significant tenderness to the muscles when active.  She uses naproxen 500 mg twice daily as needed, typically takes 1 tablet every other day.    She feels her anxiety has improved, she is currently taking sertraline 50 mg daily.    The following portions of the patient's history were reviewed and updated as appropriate: allergies, current medications, past family history, past medical history, past social history, past surgical history and problem list.    Objective  BP 98/74 (BP Location: Left arm, Patient Position: Sitting, Cuff Size: Adult)   Pulse 96   Temp 97.5 °F (36.4 °C) (Temporal)   Ht 157.5 cm (62.01\")   Wt 75.8 kg (167 lb)   SpO2 99%   BMI 30.54 kg/m²     Physical Exam  Constitutional:       General: She is not in acute distress.     Appearance: Normal appearance. She is not ill-appearing.   HENT:    "   Head: Atraumatic.      Nose: No congestion.   Eyes:      Extraocular Movements: Extraocular movements intact.      Conjunctiva/sclera: Conjunctivae normal.   Musculoskeletal:      Cervical back: Neck supple.      Right lower leg: No edema.      Left lower leg: No edema.   Neurological:      Mental Status: She is alert and oriented to person, place, and time.      Motor: Weakness present.      Gait: Gait abnormal.      Comments: Able to walk unsupported, mildly unsteady.  She is not able to stand on her toes, but her heels to lift a little bit above the surface.  She uses her arms to get up from the chair.   Psychiatric:         Behavior: Behavior normal.         Thought Content: Thought content normal.         Assessment/Plan   1. Generalized anxiety disorder  Appears to be improving.  At this time I recommend continuing on current dose, she has not yet been on it for 6 weeks then already feeling a lot better.  He will return in 1 month for follow-up.  - sertraline (ZOLOFT) 50 MG tablet; Take 1 tablet by mouth Daily.  Dispense: 30 tablet; Refill: 2    2. Fibromyalgia  3. Gait difficulty  I have told the patient that fibromyalgia is a diagnosis of exclusion, so it is unlikely possible diagnosis, but at this time not confirmed.  He will follow up with rheumatology for further evaluation.  She will also follow-up with rheumatology.  We will continue on naproxen as needed.  - naproxen (Naprosyn) 500 MG tablet; Take 1 tablet by mouth 2 (Two) Times a Day With Meals.  Dispense: 30 tablet; Refill: 2    Total time spent on chart review, charting and face-to-face with patient 24 minutes.    Return in about 4 weeks (around 10/27/2021) for Recheck.    Future Appointments       Provider Department Center    9/30/2021 TBJavid Call, OT [facility] HOME CARE [facility]     10/28/2021 10:30 AM Ahsan Pedro MD [facility] GROUP INTERNAL MEDICINE [facility]    2/22/2022 10:30 AM Jett Umanzor MD [facility]  GROUP OB GYN [facility] Pedro MD  Family Medicine  09/29/2021    Note: Speech recognition transcription software may have been used to create portions of this document.  An attempt at proofreading has been made but errors in transcription could still be present.

## 2021-10-01 ENCOUNTER — HOME CARE VISIT (OUTPATIENT)
Dept: HOME HEALTH SERVICES | Facility: HOME HEALTHCARE | Age: 21
End: 2021-10-01

## 2021-10-01 PROCEDURE — G0152 HHCP-SERV OF OT,EA 15 MIN: HCPCS

## 2021-10-11 VITALS
DIASTOLIC BLOOD PRESSURE: 70 MMHG | HEART RATE: 71 BPM | SYSTOLIC BLOOD PRESSURE: 122 MMHG | OXYGEN SATURATION: 98 % | TEMPERATURE: 97.3 F | RESPIRATION RATE: 14 BRPM

## 2021-10-28 ENCOUNTER — OFFICE VISIT (OUTPATIENT)
Dept: INTERNAL MEDICINE | Facility: CLINIC | Age: 21
End: 2021-10-28

## 2021-10-28 VITALS
DIASTOLIC BLOOD PRESSURE: 74 MMHG | BODY MASS INDEX: 30.88 KG/M2 | TEMPERATURE: 98.2 F | WEIGHT: 167.8 LBS | HEART RATE: 111 BPM | OXYGEN SATURATION: 96 % | SYSTOLIC BLOOD PRESSURE: 110 MMHG | HEIGHT: 62 IN

## 2021-10-28 DIAGNOSIS — J30.81 ALLERGIC RHINITIS DUE TO ANIMAL HAIR AND DANDER: ICD-10-CM

## 2021-10-28 DIAGNOSIS — F41.1 GENERALIZED ANXIETY DISORDER: Chronic | ICD-10-CM

## 2021-10-28 DIAGNOSIS — M79.7 FIBROMYALGIA: Chronic | ICD-10-CM

## 2021-10-28 DIAGNOSIS — R26.9 GAIT DIFFICULTY: Primary | ICD-10-CM

## 2021-10-28 PROCEDURE — 90686 IIV4 VACC NO PRSV 0.5 ML IM: CPT | Performed by: STUDENT IN AN ORGANIZED HEALTH CARE EDUCATION/TRAINING PROGRAM

## 2021-10-28 PROCEDURE — 99213 OFFICE O/P EST LOW 20 MIN: CPT | Performed by: STUDENT IN AN ORGANIZED HEALTH CARE EDUCATION/TRAINING PROGRAM

## 2021-10-28 PROCEDURE — 90471 IMMUNIZATION ADMIN: CPT | Performed by: STUDENT IN AN ORGANIZED HEALTH CARE EDUCATION/TRAINING PROGRAM

## 2021-10-28 NOTE — PROGRESS NOTES
"Chief Complaint  Lexy Ortiz is a 21 y.o. female presenting for Anxiety and Nasal Congestion (loss of appetite).     From [place]. Lives with boyfriend. No children. Works at [employer] ().     Patient has a past medical history of ovarian cyst, menorrhagia, vitamin D deficiency, headaches, depression and anxiety.    History of Present Illness  Patient is here for follow-up.    Blood 2 days ago she got very nasally congested, started sneezing a lot and has been suffering with this for last 2 days.  No sore throat, no cough.  She states she has a history of allergies, she was exposed to her friend's cat the same day the symptoms started.  She is known to react to cats.  She does have Zyrtec and nasal steroid spray that she has taken and feels it is manageable with these remedies.    She has been seeing a therapist and they are helping her managing stress and emotions.  She states she feels her mood is getting better.  He still anxious at times, and she feels this affects her appetite.  She states this has been ongoing since she was in the hospital.    Patient does have appointment to see rheumatology next month.  She is still waiting for aquatics at [facility].  Overall appears her function is getting better, she is now here today on her own without any walking device.    The following portions of the patient's history were reviewed and updated as appropriate: allergies, current medications, past family history, past medical history, past social history, past surgical history and problem list.    Objective  /74 (BP Location: Left arm, Patient Position: Sitting, Cuff Size: Adult)   Pulse 111   Temp 98.2 °F (36.8 °C) (Temporal)   Ht 157.5 cm (62.01\")   Wt 76.1 kg (167 lb 12.8 oz)   SpO2 96%   BMI 30.68 kg/m²     Physical Exam  Vitals reviewed.   Constitutional:       Appearance: Normal appearance.   HENT:      Head: Normocephalic and atraumatic.      Nose: Congestion " present.   Eyes:      Extraocular Movements: Extraocular movements intact.      Conjunctiva/sclera: Conjunctivae normal.   Cardiovascular:      Rate and Rhythm: Normal rate and regular rhythm.      Heart sounds: Normal heart sounds. No murmur heard.      Pulmonary:      Effort: Pulmonary effort is normal.      Breath sounds: Normal breath sounds.   Musculoskeletal:      Cervical back: Neck supple.      Right lower leg: No edema.      Left lower leg: No edema.   Skin:     General: Skin is warm and dry.   Neurological:      Mental Status: She is alert and oriented to person, place, and time. Mental status is at baseline.      Gait: Gait normal.   Psychiatric:         Behavior: Behavior normal.         Thought Content: Thought content normal.         Assessment/Plan   1. Gait difficulty  2. Fibromyalgia  It is very reassuring that she is doing so much better, and now able to ambulate without significant difficulty.  Patient will follow up with rheumatology next month.  I recommend she comes back in about 2 months to see how she is progressing.    3. Generalized anxiety disorder  Patient appears to get good counseling regarding her anxiety.  He denies feeling depressed.  I have suggested repeating blood work due to her decreased appetite, but she feels that this time she wants to manage her own and see how this develops.  If it gets worse she will get back in touch.  Her decreased appetite may very well be related to her mental health, but I would consider blood testing if any worsening.    4. Allergic rhinitis due to animal hair and dander  Very congested today, but no sick symptoms, so likely this is triggered by her exposure to her friend's cat.  Recommend use of Zyrtec and nasal steroid.      Return in about 2 months (around 12/28/2021) for Annual physical.    Future Appointments       Provider Department Center    1/6/2022 8:00 AM Ahsan Pedro MD [facility] INTERNAL MEDICINE [facility]    2/22/2022  10:30 AM Jett Umanzor MD [facility] OB GYN [facility]            Ahsan Pedro MD  Family Medicine  10/28/2021

## 2022-01-17 ENCOUNTER — TELEPHONE (OUTPATIENT)
Dept: INTERNAL MEDICINE | Facility: CLINIC | Age: 22
End: 2022-01-17

## 2022-01-17 NOTE — TELEPHONE ENCOUNTER
Caller: Lexy Ortiz    Relationship: Self    Best call back number: [phone]    What is the best time to reach you: ANYTIME    Who are you requesting to speak with (clinical staff, provider,  specific staff member): CLINCAL STAFF    Do you know the name of the person who called: SELF    What was the call regarding:PATIENT STATES THAT SHE WOULD LIKE A CALL FROM THE NURSE.    Do you require a callback: YES

## 2022-01-17 NOTE — TELEPHONE ENCOUNTER
Fibromyalgia is associated with gastrointestinal symptoms.  If she has been having symptoms for 3 months I would recommend she comes in for a visit to discuss this.

## 2022-01-17 NOTE — TELEPHONE ENCOUNTER
Patient was agreeable for appt on Wednesday 19th at 11:30 to discuss fibromyalgia and gi problems

## 2022-01-17 NOTE — TELEPHONE ENCOUNTER
Patient would like to know if her fibromyalgia cause gi sx.  Patient has concerns of gi problems for last x3mo and worse with eating.  Pt states she has n/v and radiating abdominal discomfort with both diarrhea and constipation.  Pt states zofran did help nausea and noted HA with leg discomfort but cant tell if it is sx of fibromyalgia.  Pt states stool changes color and when loose is primarily with yellow greenish stool.

## 2022-01-19 ENCOUNTER — LAB (OUTPATIENT)
Dept: LAB | Facility: HOSPITAL | Age: 22
End: 2022-01-19

## 2022-01-19 ENCOUNTER — OFFICE VISIT (OUTPATIENT)
Dept: INTERNAL MEDICINE | Facility: CLINIC | Age: 22
End: 2022-01-19

## 2022-01-19 VITALS
SYSTOLIC BLOOD PRESSURE: 124 MMHG | WEIGHT: 157.6 LBS | HEART RATE: 68 BPM | DIASTOLIC BLOOD PRESSURE: 84 MMHG | HEIGHT: 62 IN | BODY MASS INDEX: 29 KG/M2 | TEMPERATURE: 98.4 F

## 2022-01-19 DIAGNOSIS — R19.7 DIARRHEA, UNSPECIFIED TYPE: Primary | ICD-10-CM

## 2022-01-19 DIAGNOSIS — M79.7 FIBROMYALGIA: Chronic | ICD-10-CM

## 2022-01-19 DIAGNOSIS — R19.7 DIARRHEA, UNSPECIFIED TYPE: ICD-10-CM

## 2022-01-19 DIAGNOSIS — R10.9 ABDOMINAL CRAMPING: ICD-10-CM

## 2022-01-19 PROBLEM — R26.9 GAIT DIFFICULTY: Status: RESOLVED | Noted: 2021-08-31 | Resolved: 2022-01-19

## 2022-01-19 LAB
ALBUMIN SERPL-MCNC: 4 G/DL (ref 3.5–5.2)
ALBUMIN/GLOB SERPL: 1.1 G/DL
ALP SERPL-CCNC: 60 U/L (ref 39–117)
ALT SERPL W P-5'-P-CCNC: 20 U/L (ref 1–33)
ANION GAP SERPL CALCULATED.3IONS-SCNC: 8 MMOL/L (ref 5–15)
AST SERPL-CCNC: 25 U/L (ref 1–32)
BILIRUB SERPL-MCNC: 0.2 MG/DL (ref 0–1.2)
BUN SERPL-MCNC: 13 MG/DL (ref 6–20)
BUN/CREAT SERPL: 16.9 (ref 7–25)
CALCIUM SPEC-SCNC: 9 MG/DL (ref 8.6–10.5)
CHLORIDE SERPL-SCNC: 104 MMOL/L (ref 98–107)
CO2 SERPL-SCNC: 27 MMOL/L (ref 22–29)
CREAT SERPL-MCNC: 0.77 MG/DL (ref 0.57–1)
CRP SERPL-MCNC: 0.36 MG/DL (ref 0–0.5)
DEPRECATED RDW RBC AUTO: 38.9 FL (ref 37–54)
ERYTHROCYTE [DISTWIDTH] IN BLOOD BY AUTOMATED COUNT: 12.8 % (ref 12.3–15.4)
ERYTHROCYTE [SEDIMENTATION RATE] IN BLOOD: 36 MM/HR (ref 0–20)
GFR SERPL CREATININE-BSD FRML MDRD: 116 ML/MIN/1.73
GLOBULIN UR ELPH-MCNC: 3.6 GM/DL
GLUCOSE SERPL-MCNC: 65 MG/DL (ref 65–99)
HCT VFR BLD AUTO: 40.1 % (ref 34–46.6)
HGB BLD-MCNC: 12.8 G/DL (ref 12–15.9)
LIPASE SERPL-CCNC: 19 U/L (ref 13–60)
MCH RBC QN AUTO: 26.9 PG (ref 26.6–33)
MCHC RBC AUTO-ENTMCNC: 31.9 G/DL (ref 31.5–35.7)
MCV RBC AUTO: 84.2 FL (ref 79–97)
PLATELET # BLD AUTO: 235 10*3/MM3 (ref 140–450)
PMV BLD AUTO: 12.6 FL (ref 6–12)
POTASSIUM SERPL-SCNC: 3.8 MMOL/L (ref 3.5–5.2)
PROT SERPL-MCNC: 7.6 G/DL (ref 6–8.5)
RBC # BLD AUTO: 4.76 10*6/MM3 (ref 3.77–5.28)
SODIUM SERPL-SCNC: 139 MMOL/L (ref 136–145)
TSH SERPL DL<=0.05 MIU/L-ACNC: 1.33 UIU/ML (ref 0.27–4.2)
WBC NRBC COR # BLD: 5.24 10*3/MM3 (ref 3.4–10.8)

## 2022-01-19 PROCEDURE — 80050 GENERAL HEALTH PANEL: CPT

## 2022-01-19 PROCEDURE — 99215 OFFICE O/P EST HI 40 MIN: CPT | Performed by: STUDENT IN AN ORGANIZED HEALTH CARE EDUCATION/TRAINING PROGRAM

## 2022-01-19 PROCEDURE — 85652 RBC SED RATE AUTOMATED: CPT

## 2022-01-19 PROCEDURE — 86140 C-REACTIVE PROTEIN: CPT

## 2022-01-19 PROCEDURE — 36415 COLL VENOUS BLD VENIPUNCTURE: CPT

## 2022-01-19 PROCEDURE — 83690 ASSAY OF LIPASE: CPT

## 2022-01-19 NOTE — PROGRESS NOTES
Chief Complaint  Lexy Ortiz is a 21 y.o. female presenting for stomach problems (diarrhea and constipation, worse with cramping and nausea) and Fibromyalgia.     From [place]. Lives with boyfriend. No children. Works at [employer] ([employer]).     Patient has a past medical history of fibromyalgia, ovarian cyst, menorrhagia, vitamin D deficiency, headaches, depression and anxiety.    History of Present Illness  Patient is here due to abdominal bloating and discomfort.  He is wondering if this might be related to her fibromyalgia.    Patient states she has always had bloating and abdominal discomfort.  She has decreased bowel movements that typically do not last more than a day, where she is not straining and not passing any stools, but also not passing hard stools.  Mostly she is having diarrhea from 2-5 times per day, liquidy or vomiting, not watery.  Yellow or green color, never black or bloody.  She often has some mucus in the stool.  Also has frequent abdominal cramping, but no pain.  Her appetite varies, if she is having more bloating and cramping she has decreased appetite, otherwise it is fairly normal.  She has lost about 11 pounds since September 2021.  Her abdominal cramping is mostly located around the umbilical area, sometimes upper abdomen, not so much lower abdomen.  Of note she did have mildly elevated CRP in the past, and clearly elevated sed rate at baseline.  Last week she had worsening of her symptoms, but over the last few days it has gotten better again.    Patient continues to follow-up with rheumatology for her fibromyalgia.  She severe symptoms and was admitted with gait problems, but eventually it pulled down to likely severe case of fibromyalgia.  She states her rheumatologist did prescribe her duloxetine, but she did not yet start on it.    The following portions of the patient's history were reviewed and updated as appropriate: allergies, current medications, past  "family history, past medical history, past social history, past surgical history and problem list.    Objective  /84 (BP Location: Left arm, Patient Position: Sitting, Cuff Size: Adult)   Pulse 68   Temp 98.4 °F (36.9 °C) (Infrared)   Ht 157.5 cm (62\")   Wt 71.5 kg (157 lb 9.6 oz)   BMI 28.83 kg/m²     Physical Exam  Vitals reviewed.   Constitutional:       Appearance: Normal appearance.   HENT:      Head: Normocephalic and atraumatic.      Nose: No congestion.   Eyes:      Extraocular Movements: Extraocular movements intact.      Conjunctiva/sclera: Conjunctivae normal.   Cardiovascular:      Rate and Rhythm: Normal rate and regular rhythm.      Heart sounds: Normal heart sounds. No murmur heard.      Pulmonary:      Effort: Pulmonary effort is normal.      Breath sounds: Normal breath sounds.   Abdominal:      General: Bowel sounds are normal. There is no distension.      Palpations: Abdomen is soft. There is no mass.      Tenderness: There is abdominal tenderness. There is no guarding or rebound.      Comments: Mildly tender to palpation on the left side, otherwise nontender.  Normal bowel sounds.   Musculoskeletal:      Cervical back: Neck supple.      Right lower leg: No edema.      Left lower leg: No edema.   Skin:     General: Skin is warm and dry.   Neurological:      Mental Status: She is alert and oriented to person, place, and time. Mental status is at baseline.   Psychiatric:         Behavior: Behavior normal.         Thought Content: Thought content normal.         Assessment/Plan   1. Diarrhea, unspecified type  2. Abdominal cramping  We will do lab work as ordered, including fecal calprotectin.  Rule out IBD.  May also be IBS given her history of fibromyalgia.  Patient will get back in touch if worsening symptoms, in the meantime I would refer her to GI for possible endoscopy.  - TSH Rfx On Abnormal To Free T4; Future  - Sedimentation Rate; Future  - C-reactive Protein; Future  - CBC (No " Diff); Future  - Comprehensive Metabolic Panel; Future  - Calprotectin, Fecal - Stool, Per Rectum; Future  - Ambulatory Referral to Gastroenterology  - Lipase; Future    3. Fibromyalgia  Appears fairly stable, significantly improved from before where she had difficulty.  Continue follow-up with rheumatology.    Total time spent on chart review, charting and face-to-face with patient 40 minutes.    Return in about 3 months (around 4/19/2022) for Annual physical.    Future Appointments       Provider Department Center    2/22/2022 10:30 AM Jett Umanzor MD [facility] OB [facility]    4/19/2022 10:45 AM Ahsan Pedro MD [facility] INTERNAL MEDICINE [facility]          Ahsan Pedro MD  Family Medicine  01/19/2022

## 2022-03-14 ENCOUNTER — OFFICE VISIT (OUTPATIENT)
Dept: GASTROENTEROLOGY | Facility: CLINIC | Age: 22
End: 2022-03-14

## 2022-03-14 VITALS
TEMPERATURE: 99.1 F | DIASTOLIC BLOOD PRESSURE: 82 MMHG | BODY MASS INDEX: 27.64 KG/M2 | OXYGEN SATURATION: 99 % | WEIGHT: 150.2 LBS | SYSTOLIC BLOOD PRESSURE: 116 MMHG | HEIGHT: 62 IN | HEART RATE: 101 BPM

## 2022-03-14 DIAGNOSIS — R11.0 NAUSEA: ICD-10-CM

## 2022-03-14 DIAGNOSIS — R10.84 GENERALIZED ABDOMINAL PAIN: Primary | ICD-10-CM

## 2022-03-14 DIAGNOSIS — R19.7 DIARRHEA, UNSPECIFIED TYPE: ICD-10-CM

## 2022-03-14 DIAGNOSIS — R14.0 BLOATING: ICD-10-CM

## 2022-03-14 PROCEDURE — 99204 OFFICE O/P NEW MOD 45 MIN: CPT | Performed by: INTERNAL MEDICINE

## 2022-03-14 RX ORDER — IBUPROFEN 800 MG/1
800 TABLET ORAL EVERY 6 HOURS PRN
COMMUNITY
End: 2022-07-20

## 2022-03-14 NOTE — PROGRESS NOTES
Patient Name: Lexy Ortiz   YOB: 2000   Medical Record number: [id number]     PCP: Ahsan Pedro MD    Chief Complaint   Patient presents with   • Abdominal Cramping     With/ diarrhea       History of Present Illness:   HPI  I appreciate the consult for abdominal cramping and diarrhea.  Ms. Ortiz is a 22-year-old with a history of anxiety, depression and fibromyalgia.  The patient was evaluated by rheumatology and the diagnosis was made from that consultation.  She has a complaint of issues in general with constipation for several years.  However, over the last 2 months she has had more issues with loose stool with a frequency of 3-5 times daily.  There is no history of cristin blood in the stool.  Ms. Ortiz denies any nocturnal diarrhea.  The patient admits to some generalized abdominal discomfort that is described as crampy.  There can be abdominal discomfort without meals.  She denies any radiation to the back or flanks.  There is no history of difficult or painful swallowing.  Ms. Ortiz denies any cristin heartburn.  She has experienced issues with bloating at times with meals.  The patient reports a weight loss of about 10 pounds.  There is no history of night sweats, fever or chills.  Ms. Ortiz denies any change in the color of her eyes or skin.  There is no family history of Crohn's disease or ulcerative colitis.  The patient denies any family history of gastrointestinal cancer in first-degree relatives.  Past Medical History:   Diagnosis Date   • Anxiety    • Depression    • Generalized anxiety disorder 8/30/2021    With panic attacks   • Headache    • Irregular menses    • Menorrhagia    • Ovarian cyst    • Vitamin D deficiency 8/30/2021 6/2021: 25 OH Vit D 10.2.       Past Surgical History:   Procedure Laterality Date   • DENTAL PROCEDURE     • NO PAST SURGERIES           Current Outpatient Medications:   •  ibuprofen (ADVIL,MOTRIN) 800 MG tablet, Take 800 mg by mouth Every 6  (Six) Hours As Needed for Mild Pain ., Disp: , Rfl:   •  sertraline (ZOLOFT) 50 MG tablet, Take 1 tablet by mouth Daily., Disp: 30 tablet, Rfl: 2  •  albuterol sulfate  (90 Base) MCG/ACT inhaler, Inhale 2 puffs 2 (Two) Times a Day. PRN, Disp: , Rfl:   •  cyclobenzaprine (FLEXERIL) 5 MG tablet, Take 2 tablets by mouth 3 (Three) Times a Day As Needed for Muscle Spasms. (Patient taking differently: Take 10 mg by mouth 3 (Three) Times a Day As Needed for Muscle Spasms. PRN), Disp: 30 tablet, Rfl: 2  •  dextromethorphan polistirex ER (DELSYM) 30 MG/5ML Suspension Extended Release oral suspension, Take  by mouth. Takes 5 ml TID as needed, Disp: , Rfl:   •  naproxen (Naprosyn) 500 MG tablet, Take 1 tablet by mouth 2 (Two) Times a Day With Meals. (Patient taking differently: Take 500 mg by mouth 2 (Two) Times a Day With Meals. PRN), Disp: 30 tablet, Rfl: 2    No Known Allergies    Family History   Problem Relation Age of Onset   • Cancer Father         unknown type   • Cancer Maternal Grandmother         unknown type   • Hypertension Other        Social History     Socioeconomic History   • Marital status: Single   Tobacco Use   • Smoking status: Never Smoker   • Smokeless tobacco: Never Used   Vaping Use   • Vaping Use: Never used   Substance and Sexual Activity   • Alcohol use: Not Currently     Comment: Socially, less than once a months    • Drug use: Yes     Types: Marijuana   • Sexual activity: Yes     Partners: Male     Birth control/protection: None       Review of Systems   Constitutional: Positive for appetite change, fatigue and unexpected weight change (loss). Negative for activity change and fever.   HENT: Negative for dental problem, hearing loss, mouth sores, postnasal drip, sneezing, trouble swallowing and voice change.    Eyes: Negative for pain, redness, itching and visual disturbance.   Respiratory: Positive for cough. Negative for choking, chest tightness, shortness of breath and wheezing.     Cardiovascular: Negative for chest pain, palpitations and leg swelling.   Gastrointestinal: Positive for abdominal distention, abdominal pain (cramping), constipation, diarrhea and nausea. Negative for anal bleeding, blood in stool (once with constipation), rectal pain and vomiting.        Heartburn   Endocrine: Negative for cold intolerance, heat intolerance, polydipsia, polyphagia and polyuria.   Genitourinary: Negative.  Negative for dysuria, enuresis, flank pain, hematuria and urgency.   Musculoskeletal: Negative for arthralgias, back pain, gait problem, joint swelling and myalgias.   Skin: Negative for color change, pallor and rash.   Allergic/Immunologic: Negative for environmental allergies, food allergies and immunocompromised state.   Neurological: Negative for dizziness, tremors, seizures, facial asymmetry, speech difficulty, numbness and headaches.   Hematological: Negative for adenopathy.   Psychiatric/Behavioral: Negative for behavioral problems, confusion, dysphoric mood, hallucinations and self-injury.       Vitals:    03/14/22 1451   BP: 116/82   Pulse: 101   Temp: 99.1 °F (37.3 °C)   SpO2: 99%       Physical Exam  Vitals reviewed.   Constitutional:       Appearance: Normal appearance.   HENT:      Head: Normocephalic and atraumatic.      Nose: Nose normal.      Mouth/Throat:      Mouth: Mucous membranes are moist.      Pharynx: Oropharynx is clear.   Eyes:      General: No scleral icterus.     Extraocular Movements: Extraocular movements intact.   Cardiovascular:      Rate and Rhythm: Normal rate and regular rhythm.      Heart sounds: No murmur heard.  Pulmonary:      Breath sounds: Normal breath sounds. No wheezing or rales.   Abdominal:      General: Bowel sounds are normal.      Palpations: Abdomen is soft.      Tenderness: There is abdominal tenderness (right lower quadrant). There is no guarding.   Musculoskeletal:         General: No swelling. Normal range of motion.      Cervical back:  Normal range of motion and neck supple.   Skin:     General: Skin is dry.      Coloration: Skin is not jaundiced.   Neurological:      Mental Status: She is alert and oriented to person, place, and time.   Psychiatric:         Mood and Affect: Mood normal.         Thought Content: Thought content normal.         Judgment: Judgment normal.         Diagnoses and all orders for this visit:    1. Generalized abdominal pain (Primary)  -     Esophagogastroduodenoscopy; Future  -     Colonoscopy; Future    2. Diarrhea, unspecified type  -     Esophagogastroduodenoscopy; Future  -     Colonoscopy; Future    3. Nausea  -     Esophagogastroduodenoscopy; Future    4. Bloating  -     Esophagogastroduodenoscopy; Future    The patient has a clinical history that is consistent with functional dyspepsia and irritable bowel syndrome.  She does satisfy [place] criteria for IBS.  However, will evaluate for celiac sprue, inflammatory bowel disease and microscopic colitis.      Plan: Will proceed with an EGD and colonoscopy.

## 2022-03-17 DIAGNOSIS — Z12.11 SCREENING FOR COLON CANCER: Primary | ICD-10-CM

## 2022-03-17 RX ORDER — SODIUM, POTASSIUM,MAG SULFATES 17.5-3.13G
1 SOLUTION, RECONSTITUTED, ORAL ORAL TAKE AS DIRECTED
Qty: 354 ML | Refills: 0 | Status: SHIPPED | OUTPATIENT
Start: 2022-03-17 | End: 2022-07-20

## 2022-03-29 ENCOUNTER — CLINICAL SUPPORT NO REQUIREMENTS (OUTPATIENT)
Dept: PREADMISSION TESTING | Facility: HOSPITAL | Age: 22
End: 2022-03-29

## 2022-03-29 LAB — SARS-COV-2 RNA PNL SPEC NAA+PROBE: NOT DETECTED

## 2022-03-29 PROCEDURE — C9803 HOPD COVID-19 SPEC COLLECT: HCPCS

## 2022-03-29 PROCEDURE — U0004 COV-19 TEST NON-CDC HGH THRU: HCPCS

## 2022-04-01 ENCOUNTER — OUTSIDE FACILITY SERVICE (OUTPATIENT)
Dept: GASTROENTEROLOGY | Facility: CLINIC | Age: 22
End: 2022-04-01

## 2022-04-01 PROCEDURE — 43239 EGD BIOPSY SINGLE/MULTIPLE: CPT | Performed by: INTERNAL MEDICINE

## 2022-04-01 PROCEDURE — 45380 COLONOSCOPY AND BIOPSY: CPT | Performed by: INTERNAL MEDICINE

## 2022-04-01 PROCEDURE — 88305 TISSUE EXAM BY PATHOLOGIST: CPT | Performed by: INTERNAL MEDICINE

## 2022-04-04 ENCOUNTER — LAB REQUISITION (OUTPATIENT)
Dept: LAB | Facility: HOSPITAL | Age: 22
End: 2022-04-04

## 2022-04-04 DIAGNOSIS — R10.84 GENERALIZED ABDOMINAL PAIN: ICD-10-CM

## 2022-04-04 DIAGNOSIS — K21.00 GASTRO-ESOPHAGEAL REFLUX DISEASE WITH ESOPHAGITIS, WITHOUT BLEEDING: ICD-10-CM

## 2022-04-04 DIAGNOSIS — R11.0 NAUSEA: ICD-10-CM

## 2022-04-04 DIAGNOSIS — R19.7 DIARRHEA, UNSPECIFIED: ICD-10-CM

## 2022-04-04 DIAGNOSIS — K64.8 OTHER HEMORRHOIDS: ICD-10-CM

## 2022-04-04 DIAGNOSIS — R14.0 ABDOMINAL DISTENSION (GASEOUS): ICD-10-CM

## 2022-04-04 DIAGNOSIS — K44.9 DIAPHRAGMATIC HERNIA WITHOUT OBSTRUCTION OR GANGRENE: ICD-10-CM

## 2022-04-05 LAB
CYTO UR: NORMAL
LAB AP CASE REPORT: NORMAL
LAB AP CLINICAL INFORMATION: NORMAL
PATH REPORT.FINAL DX SPEC: NORMAL
PATH REPORT.GROSS SPEC: NORMAL

## 2022-04-06 DIAGNOSIS — K58.0 IRRITABLE BOWEL SYNDROME WITH DIARRHEA: Primary | ICD-10-CM

## 2022-04-25 ENCOUNTER — PRIOR AUTHORIZATION (OUTPATIENT)
Dept: GASTROENTEROLOGY | Facility: CLINIC | Age: 22
End: 2022-04-25

## 2022-06-28 ENCOUNTER — TELEPHONE (OUTPATIENT)
Dept: OBSTETRICS AND GYNECOLOGY | Facility: CLINIC | Age: 22
End: 2022-06-28

## 2022-06-28 NOTE — TELEPHONE ENCOUNTER
PT called, she is newly pregnant. Her LMP was May 29th and her NOB appt is July 20th.      She has Fibromyalgia and would like to speak to nurse regarding her pain.    Please advise.

## 2022-06-29 ENCOUNTER — TELEPHONE (OUTPATIENT)
Dept: OBSTETRICS AND GYNECOLOGY | Facility: CLINIC | Age: 22
End: 2022-06-29

## 2022-07-12 ENCOUNTER — TELEPHONE (OUTPATIENT)
Dept: OBSTETRICS AND GYNECOLOGY | Facility: CLINIC | Age: 22
End: 2022-07-12

## 2022-07-12 NOTE — TELEPHONE ENCOUNTER
Patient states she is nauseous. She has a history of fibromyalgia and has an rx for Zofran but she has not taken. Pt advised not to tk Zofran, dante in 1st trimester. Patient advised to take 1 Unisom (12.5mg-25mg) with 1 VitB6 12.5mg-25mg in the morning and mid afternoon if needed. At bedtime, can increase intake to 2 each if needed. Drink plenty of fluids to stay hydrated. May drink electrolyte fuilds as well. Eat frequent small meals Q1-2h, bland or dry foods,  high protein meals or snacks, avoid spicy and fatty foods. PT VU.

## 2022-07-20 ENCOUNTER — INITIAL PRENATAL (OUTPATIENT)
Dept: OBSTETRICS AND GYNECOLOGY | Facility: CLINIC | Age: 22
End: 2022-07-20

## 2022-07-20 VITALS — WEIGHT: 147.6 LBS | DIASTOLIC BLOOD PRESSURE: 70 MMHG | SYSTOLIC BLOOD PRESSURE: 114 MMHG | BODY MASS INDEX: 27 KG/M2

## 2022-07-20 DIAGNOSIS — Z34.90 PREGNANCY, UNSPECIFIED GESTATIONAL AGE: Primary | ICD-10-CM

## 2022-07-20 PROCEDURE — 0501F PRENATAL FLOW SHEET: CPT | Performed by: OBSTETRICS & GYNECOLOGY

## 2022-07-20 RX ORDER — DIPHENHYDRAMINE HYDROCHLORIDE 25 MG/1
25 CAPSULE ORAL DAILY
Qty: 30 TABLET | Refills: 3 | Status: SHIPPED | OUTPATIENT
Start: 2022-07-20 | End: 2023-02-08

## 2022-07-20 RX ORDER — PROMETHAZINE HYDROCHLORIDE 12.5 MG/1
12.5 TABLET ORAL EVERY 6 HOURS PRN
Qty: 30 TABLET | Refills: 3 | Status: SHIPPED | OUTPATIENT
Start: 2022-07-20 | End: 2022-09-24 | Stop reason: SDUPTHER

## 2022-07-20 RX ORDER — DULOXETIN HYDROCHLORIDE 30 MG/1
30 CAPSULE, DELAYED RELEASE ORAL DAILY
COMMUNITY
End: 2022-11-22 | Stop reason: SDUPTHER

## 2022-07-20 RX ORDER — LANOLIN ALCOHOL/MO/W.PET/CERES
50 CREAM (GRAM) TOPICAL DAILY
COMMUNITY
End: 2022-11-14

## 2022-07-20 NOTE — PROGRESS NOTES
Initial ob visit     CC- Here for care of pregnancy        Lexy Ortiz is a 22 y.o. female, , who presents for her first obstetrical visit.  Her last LMP was Patient's last menstrual period was 2022..    OB History    Para Term  AB Living   1 0 0 0 0 0   SAB IAB Ectopic Molar Multiple Live Births   0 0 0 0 0 0      # Outcome Date GA Lbr Keon/2nd Weight Sex Delivery Anes PTL Lv   1 Current                Initial positive test date : 2022, UPT          Prior obstetric issues: none  Patient's past medical history is significant for: Denies  Family history of genetic issues (includes FOB): Denies  Prior infections concerning in pregnancy (Rash, fever in last 2 weeks): No  Varicella Hx - vaccinated  Prior testing for Cystic Fibrosis Carrier or Sickle Cell Trait- Denies  Prepregnancy BMI - Body mass index is 27 kg/m².  History of STD: Yes (chlamydia)  Hx of HSV for patient or partner: no  Ultrasound Today: yes      Additional Pertinent History   Last Pap : none     Last Completed Pap Smear     This patient has no relevant Health Maintenance data.        History of abnormal Pap smear: no  Family history of uterine, colon, breast, or ovarian cancer: no  Feelings of Anxiety or Depression: yes - treated with medication   Tobacco Usage?: No   Alcohol/Drug Use?: NO  Over the age of 35 at delivery: no  Desires Genetic Screening: Cell Free DNA  Flu Status: Already given in current flu season    PMH    Current Outpatient Medications:   •  albuterol sulfate  (90 Base) MCG/ACT inhaler, Inhale 2 puffs 2 (Two) Times a Day. PRN, Disp: , Rfl:   •  doxylamine (UNISOM) 25 MG tablet, Take 25 mg by mouth At Night As Needed for Sleep., Disp: , Rfl:   •  DULoxetine (CYMBALTA) 30 MG capsule, Take 30 mg by mouth Daily., Disp: , Rfl:   •  vitamin B-6 (PYRIDOXINE) 50 MG tablet, Take 50 mg by mouth Daily., Disp: , Rfl:   •  promethazine (PHENERGAN) 12.5 MG tablet, Take 1 tablet by mouth Every 6 (Six)  Hours As Needed for Nausea or Vomiting., Disp: 30 tablet, Rfl: 3  •  vitamin B-6 (PYRIDOXINE) 25 MG tablet, Take 1 tablet by mouth Daily., Disp: 30 tablet, Rfl: 3     Past Medical History:   Diagnosis Date   • Anxiety    • Depression    • Fibromyalgia    • Generalized anxiety disorder 08/30/2021    With panic attacks   • Headache    • Irregular menses    • Menorrhagia    • Ovarian cyst    • Vitamin D deficiency 08/30/2021 6/2021: 25 OH Vit D 10.2.        Past Surgical History:   Procedure Laterality Date   • COLONOSCOPY     • DENTAL PROCEDURE     • ENDOSCOPY     • NO PAST SURGERIES         Review of Systems   Review of Systems  Patient Reports: Nausea and vomiting  Patient Denies: Spotting, Heavy bleeding, Cramping and Fatigue  All systems reviewed and otherwise normal.    I have reviewed and agree with the HPI, ROS, and historical information as entered above. Jett Umanzor MD    /70   Wt 67 kg (147 lb 9.6 oz)   LMP 05/29/2022   BMI 27.00 kg/m²     The additional following portions of the patient's history were reviewed and updated as appropriate: allergies, current medications, past family history, past medical history, past social history, past surgical history and problem list.    Physical Exam  General:  well developed; well nourished  no acute distress   Chest/Respiratory: No labored breathing, normal respiratory effort, normal appearance, no respiratory noises noted   Heart:  normal rate, regular rhythm,  no murmurs, rubs, or gallops   Thyroid: normal to inspection and palpation   Breasts:  Not performed.   Abdomen: soft, non-tender; no masses  no umbilical or inguinal hernias are present  no hepato-splenomegaly   Pelvis: Clinical staff was present for exam  External genitalia:  normal appearance of the external genitalia including Bartholin's and Willards's glands.  :  urethral meatus normal;  Vaginal:  normal pink mucosa without prolapse or lesions.  Cervix:  normal appearance.  Uterus:   normal size, shape and consistency.        Assessment and Plan    Problem List Items Addressed This Visit    None     Visit Diagnoses     Pregnancy, unspecified gestational age    -  Primary    Relevant Orders    US Ob Transvaginal (Completed)    Obstetric Panel    HIV-1 / O / 2 Ag / Antibody 4th Generation    Urine Drug Screen - Urine, Clean Catch    Urine Culture - Urine, Urine, Clean Catch    Urinalysis With Microscopic - Urine, Clean Catch    LIQUID-BASED PAP SMEAR, [facility] ([facility])          1. Pregnancy at 7w3d  2. Reviewed routine prenatal care with the office and educational materials given  3. Lab(s) Ordered  4. Discussed options for genetic testing including first trimester nuchal translucency screen, genetic disease carrier testing, quadruple screen, and [place].  5. Medication(s) Ordered  6. Nausea/Vomiting - desires medication.  Options discussed and encouraged to be proactive to avoid constipation if on Zofran.  7. Patient is on Prenatal vitamins  Return in about 4 weeks (around 8/17/2022) for Routine prenatal care.      Jett Umanzor MD  07/20/2022   Diminished. Infection +/- Neulasta after chemo.

## 2022-07-22 LAB
ABO GROUP BLD: NORMAL
AMPHETAMINES UR QL SCN: NEGATIVE NG/ML
APPEARANCE UR: CLEAR
BACTERIA #/AREA URNS HPF: NORMAL /[HPF]
BACTERIA UR CULT: NORMAL
BACTERIA UR CULT: NORMAL
BARBITURATES UR QL SCN: NEGATIVE NG/ML
BASOPHILS # BLD AUTO: 0.1 X10E3/UL (ref 0–0.2)
BASOPHILS NFR BLD AUTO: 1 %
BENZODIAZ UR QL SCN: NEGATIVE NG/ML
BILIRUB UR QL STRIP: NEGATIVE
BLD GP AB SCN SERPL QL: NEGATIVE
BZE UR QL SCN: NEGATIVE NG/ML
CANNABINOIDS UR QL SCN: POSITIVE NG/ML
CASTS URNS QL MICRO: NORMAL /LPF
COLOR UR: YELLOW
CREAT UR-MCNC: 252.3 MG/DL (ref 20–300)
EOSINOPHIL # BLD AUTO: 0.1 X10E3/UL (ref 0–0.4)
EOSINOPHIL NFR BLD AUTO: 1 %
EPI CELLS #/AREA URNS HPF: NORMAL /HPF (ref 0–10)
ERYTHROCYTE [DISTWIDTH] IN BLOOD BY AUTOMATED COUNT: 13.2 % (ref 11.7–15.4)
GLUCOSE UR QL STRIP: NEGATIVE
HBV SURFACE AG SERPL QL IA: NEGATIVE
HCT VFR BLD AUTO: 40.3 % (ref 34–46.6)
HCV AB S/CO SERPL IA: 0.1 S/CO RATIO (ref 0–0.9)
HGB BLD-MCNC: 13.2 G/DL (ref 11.1–15.9)
HGB UR QL STRIP: NEGATIVE
HIV 1+2 AB+HIV1 P24 AG SERPL QL IA: NON REACTIVE
IMM GRANULOCYTES # BLD AUTO: 0 X10E3/UL (ref 0–0.1)
IMM GRANULOCYTES NFR BLD AUTO: 0 %
KETONES UR QL STRIP: NEGATIVE
LABORATORY COMMENT REPORT: ABNORMAL
LEUKOCYTE ESTERASE UR QL STRIP: NEGATIVE
LYMPHOCYTES # BLD AUTO: 2.2 X10E3/UL (ref 0.7–3.1)
LYMPHOCYTES NFR BLD AUTO: 28 %
MCH RBC QN AUTO: 27.8 PG (ref 26.6–33)
MCHC RBC AUTO-ENTMCNC: 32.8 G/DL (ref 31.5–35.7)
MCV RBC AUTO: 85 FL (ref 79–97)
METHADONE UR QL SCN: NEGATIVE NG/ML
MICRO URNS: NORMAL
MICRO URNS: NORMAL
MONOCYTES # BLD AUTO: 0.5 X10E3/UL (ref 0.1–0.9)
MONOCYTES NFR BLD AUTO: 6 %
MUCOUS THREADS URNS QL MICRO: PRESENT
NEUTROPHILS # BLD AUTO: 5.1 X10E3/UL (ref 1.4–7)
NEUTROPHILS NFR BLD AUTO: 64 %
NITRITE UR QL STRIP: NEGATIVE
OPIATES UR QL SCN: NEGATIVE NG/ML
OXYCODONE+OXYMORPHONE UR QL SCN: NEGATIVE NG/ML
PCP UR QL: NEGATIVE NG/ML
PH UR STRIP: 6.5 [PH] (ref 5–7.5)
PH UR: 6.3 [PH] (ref 4.5–8.9)
PLATELET # BLD AUTO: 226 X10E3/UL (ref 150–450)
PROPOXYPH UR QL SCN: NEGATIVE NG/ML
PROT UR QL STRIP: NORMAL
RBC # BLD AUTO: 4.75 X10E6/UL (ref 3.77–5.28)
RBC #/AREA URNS HPF: NORMAL /HPF (ref 0–2)
REF LAB TEST METHOD: NORMAL
RH BLD: POSITIVE
RPR SER QL: NON REACTIVE
RUBV IGG SERPL IA-ACNC: 5.47 INDEX
SP GR UR STRIP: 1.02 (ref 1–1.03)
UROBILINOGEN UR STRIP-MCNC: 0.2 MG/DL (ref 0.2–1)
WBC # BLD AUTO: 7.9 X10E3/UL (ref 3.4–10.8)
WBC #/AREA URNS HPF: NORMAL /HPF (ref 0–5)

## 2022-08-11 ENCOUNTER — ROUTINE PRENATAL (OUTPATIENT)
Dept: OBSTETRICS AND GYNECOLOGY | Facility: CLINIC | Age: 22
End: 2022-08-11

## 2022-08-11 ENCOUNTER — TELEPHONE (OUTPATIENT)
Dept: OBSTETRICS AND GYNECOLOGY | Facility: CLINIC | Age: 22
End: 2022-08-11

## 2022-08-11 VITALS — WEIGHT: 152 LBS | DIASTOLIC BLOOD PRESSURE: 72 MMHG | BODY MASS INDEX: 27.8 KG/M2 | SYSTOLIC BLOOD PRESSURE: 110 MMHG

## 2022-08-11 DIAGNOSIS — Z34.90 PREGNANCY, UNSPECIFIED GESTATIONAL AGE: ICD-10-CM

## 2022-08-11 DIAGNOSIS — O26.859 SPOTTING IN PREGNANCY: Primary | ICD-10-CM

## 2022-08-11 LAB
BILIRUB BLD-MCNC: ABNORMAL MG/DL
CLARITY, POC: ABNORMAL
COLOR UR: YELLOW
EXPIRATION DATE: ABNORMAL
GLUCOSE UR STRIP-MCNC: NEGATIVE MG/DL
KETONES UR QL: ABNORMAL
LEUKOCYTE EST, POC: ABNORMAL
Lab: ABNORMAL
NITRITE UR-MCNC: NEGATIVE MG/ML
PH UR: 7 [PH] (ref 5–8)
PROT UR STRIP-MCNC: ABNORMAL MG/DL
RBC # UR STRIP: NEGATIVE /UL
SP GR UR: 1.02 (ref 1–1.03)
UROBILINOGEN UR QL: NORMAL

## 2022-08-11 PROCEDURE — 0502F SUBSEQUENT PRENATAL CARE: CPT | Performed by: NURSE PRACTITIONER

## 2022-08-11 NOTE — PROGRESS NOTES
OB FOLLOW UP  CC- Here for care of pregnancy        Lexy Ortiz is a 22 y.o.  10w4d patient being seen today for spotting that has increased. She states she has had some spotting, but this morning when she woke up, it was a little more than spotting and pink. Last IC last week .  She denies dysuria, fever, pelvic pain.  Her prenatal care is complicated by (and status) :    Patient Active Problem List   Diagnosis   • History of chlamydia infection   • Dysmenorrhea   • Generalized anxiety disorder   • Vitamin D deficiency   • Hypomagnesemia   • Fibromyalgia   • Allergic rhinitis due to animal hair and dander         Ultrasound Today: no    ROS -   Patient Reports :   spotting  Patient Denies: fever, odor, itching, dysuria    All other systems reviewed and are negative.       The additional following portions of the patient's history were reviewed and updated as appropriate: allergies, current medications, past family history, past medical history, past social history, past surgical history and problem list.    I have reviewed and agree with the HPI, ROS, and historical information as entered above. Sydni Marie MA    /72   Wt 68.9 kg (152 lb)   LMP 2022   BMI 27.80 kg/m²       EXAM:     FHT: wnl BPM   Unable to heart heart tones with doppler.  US wnl--retroverted    Urine glucose/protein: See prenatal flowsheet       Assessment and Plan    Problem List Items Addressed This Visit    None     Visit Diagnoses     Dysuria    -  Primary    Relevant Orders    POC Urinalysis Dipstick, Automated (Completed)          1. Pregnancy at 10w4d  2. Fetal status reassuring.   3. Pelvic rest, no straining or heavy lifting.  4. RTO as scheduled and prn    Sydni Marie MA  2022

## 2022-08-11 NOTE — TELEPHONE ENCOUNTER
Pt called back with some cramping and passed one more dot of blood. I spoke with Edith and she said CCUA and eval and FHT's first. PT will come at 3:45

## 2022-08-11 NOTE — TELEPHONE ENCOUNTER
Slight spotting this morning only when she wiped and 2 weeks ago. She states her vag d/c has increased with an odor. G1 10w 4days A positive blood type and she sees Dr. Umanzor

## 2022-08-12 ENCOUNTER — TELEPHONE (OUTPATIENT)
Dept: OBSTETRICS AND GYNECOLOGY | Facility: CLINIC | Age: 22
End: 2022-08-12

## 2022-08-12 RX ORDER — PNV NO.95/FERROUS FUM/FOLIC AC 28MG-0.8MG
1 TABLET ORAL DAILY
Qty: 30 TABLET | Refills: 11 | Status: SHIPPED | OUTPATIENT
Start: 2022-08-12

## 2022-08-13 LAB
BACTERIA UR CULT: NORMAL
BACTERIA UR CULT: NORMAL

## 2022-08-15 ENCOUNTER — TELEPHONE (OUTPATIENT)
Dept: OBSTETRICS AND GYNECOLOGY | Facility: CLINIC | Age: 22
End: 2022-08-15

## 2022-08-15 NOTE — TELEPHONE ENCOUNTER
Dr. Umanzor OB pt.   11w1d    S/w pt she states she has been having mild vaginal spotting and cramping today and was wondering if she needed to come in tomorrow for further evaluation or what Dr. Umanzor recommends. I told patient I would discuss this with Dr. Umanzor and CB with plan of care. She v/u     Patient denies: recent I/C, increased exercise, heavy lifting.     MBT: A +

## 2022-08-15 NOTE — TELEPHONE ENCOUNTER
S/w pt she v/u Dr. Ovalles recommendations and I told her that I would have the  call her to add her on for U/S the day of next appt. She v/u

## 2022-08-15 NOTE — TELEPHONE ENCOUNTER
If it's just cramping and spotting, I would just watch it. See if we can't add her on for US the same day as her f/u appointment.

## 2022-08-23 ENCOUNTER — ROUTINE PRENATAL (OUTPATIENT)
Dept: OBSTETRICS AND GYNECOLOGY | Facility: CLINIC | Age: 22
End: 2022-08-23

## 2022-08-23 VITALS — DIASTOLIC BLOOD PRESSURE: 70 MMHG | BODY MASS INDEX: 28.24 KG/M2 | SYSTOLIC BLOOD PRESSURE: 112 MMHG | WEIGHT: 154.4 LBS

## 2022-08-23 DIAGNOSIS — Z3A.12 12 WEEKS GESTATION OF PREGNANCY: Primary | ICD-10-CM

## 2022-08-23 LAB
EXPIRATION DATE: 0
GLUCOSE UR STRIP-MCNC: NEGATIVE MG/DL
Lab: 0
PROT UR STRIP-MCNC: NEGATIVE MG/DL

## 2022-08-23 PROCEDURE — 0502F SUBSEQUENT PRENATAL CARE: CPT | Performed by: OBSTETRICS & GYNECOLOGY

## 2022-08-23 NOTE — PROGRESS NOTES
OB FOLLOW UP  CC- Here for care of pregnancy        Lexy Ortiz is a 22 y.o.  12w2d patient being seen today for cramping and spotting that started >5 day(s) ago and has remained the same since then. She states that everyday she has spotting when she wipes. The bleeding was red, light pink, brown in color. The patient's blood type is RH Positive. She denies recent intercourse. She has not had a recent cervical check. She does not have associated pain. Patient was seen on the 8/15 and urine culture was sent. She reports vaginal itching and discharge.  US today- viable IUP with no evidence of TIFFANIE. Discussed findings and all questions answered.   She is eating better with positive weight gain.   Her prenatal care is complicated by (and status) :    Patient Active Problem List   Diagnosis   • History of chlamydia infection   • Dysmenorrhea   • Generalized anxiety disorder   • Vitamin D deficiency   • Hypomagnesemia   • Fibromyalgia   • Allergic rhinitis due to animal hair and dander         Ultrasound Today: Yes.  Findings showed single IUP, cardiac activity present.  I have personally evaluated the U/S and agree with the findings. Jett Umanzor MD    ROS -   Patient Reports : Vaginal Spotting and Cramping, nausea and vomiting, occasional headache  Patient Denies: Loss of Fluid and Vision Changes  Fetal Movement : not yet  All other systems reviewed and are negative.       The additional following portions of the patient's history were reviewed and updated as appropriate: allergies and current medications.    I have reviewed and agree with the HPI, ROS, and historical information as entered above. Jett Umanzor MD    /70   Wt 70 kg (154 lb 6.4 oz)   LMP 2022   BMI 28.24 kg/m²       EXAM:     FHT: 150 BPM   Uterine Size: size equals dates  Pelvic Exam: No    Urine glucose/protein: See prenatal flowsheet       Assessment and Plan    Problem List Items Addressed This Visit     None     Visit Diagnoses     12 weeks gestation of pregnancy    -  Primary    Relevant Orders    POC Protein, Urine, Qualitative, Dipstick (Completed)    POC Glucose, Urine, Qualitative, Dipstick (Completed)          1. Pregnancy at 12w2d  2. Fetal status reassuring.   3. RTC for worsening symptoms  Return in about 4 weeks (around 9/20/2022) for Routine prenatal care.    Jett Umanzor MD  08/23/2022

## 2022-09-17 ENCOUNTER — TELEPHONE (OUTPATIENT)
Dept: OBSTETRICS AND GYNECOLOGY | Facility: CLINIC | Age: 22
End: 2022-09-17

## 2022-09-17 NOTE — TELEPHONE ENCOUNTER
Returned call to patient. Patient states she is 15 weeks pregnant and she wore panties that were too small yesterday and was sweating a lot and now her left labia is swollen and irritated. She also has a white d/c. She denies itching, recent trauma, no concern for STD (no IC since STD testing was performed), no shaving. She has had spotting for approx 2 months but Dr Umanzor is aware and workup has been done. Patient advised pelvic rest, ice packs, tylenol, Terazol 7 prescribed. Advised if symptoms to do not improve by tomorrow or worsen go to nearest [facility] or ED for evaluation. Pt CAPRI.

## 2022-09-20 ENCOUNTER — ROUTINE PRENATAL (OUTPATIENT)
Dept: OBSTETRICS AND GYNECOLOGY | Facility: CLINIC | Age: 22
End: 2022-09-20

## 2022-09-20 VITALS — SYSTOLIC BLOOD PRESSURE: 118 MMHG | DIASTOLIC BLOOD PRESSURE: 74 MMHG

## 2022-09-20 DIAGNOSIS — Z3A.16 16 WEEKS GESTATION OF PREGNANCY: Primary | ICD-10-CM

## 2022-09-20 DIAGNOSIS — N76.4 VULVAR ABSCESS: ICD-10-CM

## 2022-09-20 LAB
EXPIRATION DATE: 0
GLUCOSE UR STRIP-MCNC: NEGATIVE MG/DL
Lab: 0
PROT UR STRIP-MCNC: NEGATIVE MG/DL

## 2022-09-20 PROCEDURE — 0502F SUBSEQUENT PRENATAL CARE: CPT | Performed by: OBSTETRICS & GYNECOLOGY

## 2022-09-20 PROCEDURE — 56405 I&D VULVA/PERINEAL ABSCESS: CPT | Performed by: OBSTETRICS & GYNECOLOGY

## 2022-09-20 RX ORDER — METRONIDAZOLE 500 MG/1
500 TABLET ORAL 2 TIMES DAILY
Qty: 14 TABLET | Refills: 0 | Status: SHIPPED | OUTPATIENT
Start: 2022-09-20 | End: 2022-09-27

## 2022-09-20 RX ORDER — HYDROCODONE BITARTRATE AND ACETAMINOPHEN 5; 325 MG/1; MG/1
1 TABLET ORAL EVERY 6 HOURS PRN
Qty: 8 TABLET | Refills: 0 | Status: SHIPPED | OUTPATIENT
Start: 2022-09-20 | End: 2022-11-14

## 2022-09-20 RX ORDER — CEPHALEXIN 500 MG/1
500 CAPSULE ORAL 2 TIMES DAILY
Qty: 14 CAPSULE | Refills: 0 | Status: SHIPPED | OUTPATIENT
Start: 2022-09-20 | End: 2022-09-27

## 2022-09-23 ENCOUNTER — ROUTINE PRENATAL (OUTPATIENT)
Dept: OBSTETRICS AND GYNECOLOGY | Facility: CLINIC | Age: 22
End: 2022-09-23

## 2022-09-23 VITALS — WEIGHT: 157.6 LBS | BODY MASS INDEX: 28.83 KG/M2 | SYSTOLIC BLOOD PRESSURE: 110 MMHG | DIASTOLIC BLOOD PRESSURE: 76 MMHG

## 2022-09-23 DIAGNOSIS — Z3A.16 16 WEEKS GESTATION OF PREGNANCY: Primary | ICD-10-CM

## 2022-09-23 DIAGNOSIS — O21.9 NAUSEA AND VOMITING DURING PREGNANCY: ICD-10-CM

## 2022-09-23 LAB
GLUCOSE UR STRIP-MCNC: NEGATIVE MG/DL
PROT UR STRIP-MCNC: NEGATIVE MG/DL

## 2022-09-23 PROCEDURE — 0502F SUBSEQUENT PRENATAL CARE: CPT

## 2022-09-23 NOTE — PROGRESS NOTES
OB FOLLOW UP  CC- Here for care of pregnancy        Lexy Ortiz is a 22 y.o.  16w5d patient being seen today for her obstetrical follow up visit. Patient reports nausea and vomiting treating with vitamin B6 and Unisom with no improvement.    Her prenatal care is complicated by (and status) : None  Patient Active Problem List   Diagnosis   • History of chlamydia infection   • Dysmenorrhea   • Generalized anxiety disorder   • Vitamin D deficiency   • Hypomagnesemia   • Fibromyalgia   • Allergic rhinitis due to animal hair and dander   • Vulvar abscess       Flu Status: Unknown  Ultrasound Today: No    AFP: declines    ROS -   Patient Reports : Patient reports nausea and vomiting treating with vitamin B6 and Unisom with no improvement.  Patient Denies: Loss of Fluid, Vaginal Spotting, Vision Changes, Headaches, Contractions and Epigastric pain  Fetal Movement : absent  All other systems reviewed and are negative.       The additional following portions of the patient's history were reviewed and updated as appropriate: allergies, current medications, past family history, past medical history, past social history, past surgical history and problem list.    I have reviewed and agree with the HPI, ROS, and historical information as entered above. Reina Dowd, LIZANDRO        EXAM:     Prenatal Vitals  BP: 110/76  Weight: 71.5 kg (157 lb 9.6 oz)   Fetal Heart Rate: 154   Pelvic Exam:        Urine Glucose Read-only: Negative  Urine Protein Read-only: Negative           Assessment and Plan    Problem List Items Addressed This Visit    None     Visit Diagnoses     16 weeks gestation of pregnancy    -  Primary    Relevant Orders    POC Urinalysis Dipstick (Completed)    Nausea and vomiting during pregnancy              1. Pregnancy at 16w5d  2. Fetal status reassuring.   3. Counseled on MSAFP alone in relation to OTD and placental issues.    4. Anatomy scan next visit.   5. Activity and Exercise  discussed.  6. U/S ordered at follow up- Ordered by CBF  7. Patient is on Prenatal vitamins   8. Promethazine prescribed.   9. Increase H20 intake to 2-3L/day  10. Eat frequent small meals Q1-2h, bland or dry foods,  high protein meals or snacks, avoid spicy and fatty foods.   11. Return in about 1 month (around 10/23/2022) for SHELBY fontenot/ [facility] US.    Reina Dowd, APRN  09/23/2022

## 2022-09-24 RX ORDER — PROMETHAZINE HYDROCHLORIDE 12.5 MG/1
12.5 TABLET ORAL EVERY 6 HOURS PRN
Qty: 30 TABLET | Refills: 3 | Status: SHIPPED | OUTPATIENT
Start: 2022-09-24 | End: 2023-02-08

## 2022-09-26 LAB
BACTERIA SPEC AEROBE CULT: NORMAL
BACTERIA SPEC ANAEROBE CULT: NORMAL
BACTERIA SPEC CULT: NORMAL
BACTERIA SPEC CULT: NORMAL

## 2022-10-28 ENCOUNTER — ROUTINE PRENATAL (OUTPATIENT)
Dept: OBSTETRICS AND GYNECOLOGY | Facility: CLINIC | Age: 22
End: 2022-10-28

## 2022-10-28 VITALS — WEIGHT: 171 LBS | BODY MASS INDEX: 31.28 KG/M2 | DIASTOLIC BLOOD PRESSURE: 70 MMHG | SYSTOLIC BLOOD PRESSURE: 122 MMHG

## 2022-10-28 DIAGNOSIS — Z23 FLU VACCINE NEED: ICD-10-CM

## 2022-10-28 DIAGNOSIS — Z34.82 ENCOUNTER FOR SUPERVISION OF OTHER NORMAL PREGNANCY, SECOND TRIMESTER: Primary | ICD-10-CM

## 2022-10-28 LAB
EXPIRATION DATE: 0
GLUCOSE UR STRIP-MCNC: NEGATIVE MG/DL
Lab: 0
PROT UR STRIP-MCNC: NEGATIVE MG/DL

## 2022-10-28 PROCEDURE — 0502F SUBSEQUENT PRENATAL CARE: CPT | Performed by: NURSE PRACTITIONER

## 2022-10-28 PROCEDURE — 90471 IMMUNIZATION ADMIN: CPT | Performed by: NURSE PRACTITIONER

## 2022-10-28 PROCEDURE — 90686 IIV4 VACC NO PRSV 0.5 ML IM: CPT | Performed by: NURSE PRACTITIONER

## 2022-10-28 NOTE — PROGRESS NOTES
OB FOLLOW UP  CC- Here for care of pregnancy        Lexy Ortiz is a 22 y.o.  21w5d patient being seen today for her obstetrical follow up visit. Patient reports occ. headache. That is relieved by Tylenol or rest. , low back pain. She denies dysuria., nausea with vomiting and cramping with baby movement.     Her prenatal care is complicated by (and status) :   Patient Active Problem List   Diagnosis   • History of chlamydia infection   • Dysmenorrhea   • Generalized anxiety disorder   • Vitamin D deficiency   • Hypomagnesemia   • Fibromyalgia   • Allergic rhinitis due to animal hair and dander   • Vulvar abscess       Flu Status: Will give in office today  Ultrasound Today: Yes    ROS -   Patient Reports : Headaches, Cramping and Nausea  Patient Denies: Loss of Fluid, Vaginal Spotting, Vision Changes, Contractions and Epigastric pain  Fetal Movement : normal  All other systems reviewed and are negative.       The additional following portions of the patient's history were reviewed and updated as appropriate: allergies and current medications.    I have reviewed and agree with the HPI, ROS, and historical information as entered above. Kassi Slaughter, APRN    /70   Wt 77.6 kg (171 lb)   LMP 2022   BMI 31.28 kg/m²       EXAM:     Prenatal Vitals  BP: 122/70  Weight: 77.6 kg (171 lb)              Urine Glucose Read-only: Negative  Urine Protein Read-only: Negative       Assessment and Plan    Problem List Items Addressed This Visit    None  Visit Diagnoses     Encounter for supervision of other normal pregnancy, second trimester    -  Primary    Relevant Orders    POC Glucose, Urine, Qualitative, Dipstick (Completed)    POC Protein, Urine, Qualitative, Dipstick (Completed)    FluLaval/Fluzone >6 mos (5204-1204) (Completed)    Flu vaccine need        Relevant Orders    FluLaval/Fluzone >6 mos (9946-6938) (Completed)          1. Pregnancy at 21w5d  2. Anatomy scan today is incomplete, follow  up in 4 weeks for additional views. Anatomy that was visualized was within normal limits.  3. Fetal status reassuring.   4. Activity and Exercise discussed.  5. Patient is on Prenatal vitamins  Flu vaccine given today  RTC in 4 weeks for f/u ultrasound to check RVOT and see Dr.Forester Kassi Slaughter, APRN  10/28/2022

## 2022-11-14 ENCOUNTER — OFFICE VISIT (OUTPATIENT)
Dept: INTERNAL MEDICINE | Facility: CLINIC | Age: 22
End: 2022-11-14

## 2022-11-14 VITALS
DIASTOLIC BLOOD PRESSURE: 74 MMHG | TEMPERATURE: 98.4 F | BODY MASS INDEX: 32.39 KG/M2 | WEIGHT: 176 LBS | HEART RATE: 92 BPM | HEIGHT: 62 IN | SYSTOLIC BLOOD PRESSURE: 122 MMHG

## 2022-11-14 DIAGNOSIS — J06.9 UPPER RESPIRATORY TRACT INFECTION, UNSPECIFIED TYPE: ICD-10-CM

## 2022-11-14 DIAGNOSIS — R11.2 NAUSEA AND VOMITING, UNSPECIFIED VOMITING TYPE: Primary | ICD-10-CM

## 2022-11-14 DIAGNOSIS — R05.1 ACUTE COUGH: ICD-10-CM

## 2022-11-14 LAB
EXPIRATION DATE: NORMAL
FLUAV AG UPPER RESP QL IA.RAPID: NOT DETECTED
FLUBV AG UPPER RESP QL IA.RAPID: NOT DETECTED
INTERNAL CONTROL: NORMAL
Lab: NORMAL
SARS-COV-2 AG UPPER RESP QL IA.RAPID: NOT DETECTED

## 2022-11-14 PROCEDURE — 99213 OFFICE O/P EST LOW 20 MIN: CPT | Performed by: NURSE PRACTITIONER

## 2022-11-14 PROCEDURE — 87428 SARSCOV & INF VIR A&B AG IA: CPT | Performed by: NURSE PRACTITIONER

## 2022-11-14 NOTE — PROGRESS NOTES
"  Follow Up Office Visit      Patient Name: Lexy Ortiz  : 2000   MRN: [id number]   Care Team: Patient Care Team:  Ahsan Pedro MD as PCP - General (Family Medicine)  Dmitriy Lindsey MD as Consulting Physician (Gastroenterology)  Jett Umanzor MD as Consulting Physician (Obstetrics and Gynecology)    Chief Complaint:    Chief Complaint   Patient presents with   • Cough     Ongoing for a week    • Vomiting     Patient is 24 weeks pregnant     • Sinus Problem     Ongoing for 2-3 weeks        History of Present Illness: Lexy Ortiz is a 22 y.o. female who is here today to follow up with cough, vomiting and sinus problems. She is approximately 21 weeks pregnant.    The patient reports during her first trimester she had severe morning sickness. It has improved during the second trimester.     Over the past week, she reports feeling nauseous and vomiting when she eats anything. She notes she is able to tolerate water, but can not drink any other fluid like juice because she starts to vomit.  She adds she has not tried Gatorade. She has tried eating rice on 2022 and vomited.  The vomiting does not occur right away it may take hours later for her to vomit what she has eaten. She denies having diarrhea, fevers, or chills. She is uninterested it getting lab work done to determine if she is dehydrated.    She is also worried because she has been having congestion as well. She adds she has been having a intermittent cough. She admits she is currently experiencing a headache. She feels like her headaches are due to her congestion. She has been experiencing some ear pain for 3 days now, but it is not her inner ear it is within the outside. She denies having shortness of breath \"just the normal\" or any phlegm coming up when coughing. Shee denies being around someone who may have been sick, but may have been exposed to something while working.     She reports her fibromyalgia has been " flaring up and is causing her to experience body ache.    Her OBGYN prescribed her promethazine on 09/24/2022 to prevent nausea and vomiting. She was also advised to eat frequent small meals every 1-2 hours high protein meals or snacks. Her OBGYN also advised her to avoid spicy fatty foods and to drink at least 2 to 3 liters of water a day. She reports she has not been taking the promethazine recently because the OBGYN prescribed her that during her first trimester. She decided to stop taking the medication because her vomiting was not as frequent any more.    Subjective      Review of Systems:   Review of Systems   Constitutional:        Positive for body aches/malaise   HENT: Positive for congestion and sinus pressure.    Respiratory: Positive for cough. Negative for wheezing.    Cardiovascular: Negative for chest pain, palpitations and leg swelling.   Gastrointestinal: Positive for nausea and vomiting. Negative for abdominal pain and diarrhea.       I have reviewed and the following portions of the patient's history were updated as appropriate: past family history, past medical history, past social history, past surgical history and problem list.    Medications:     Current Outpatient Medications:   •  albuterol sulfate  (90 Base) MCG/ACT inhaler, Inhale 2 puffs Every 4 (Four) Hours As Needed., Disp: , Rfl:   •  DULoxetine (CYMBALTA) 30 MG capsule, Take 30 mg by mouth Daily., Disp: , Rfl:   •  Prenatal Vit-Fe Fumarate-FA (Prenatal Vitamin) 27-0.8 MG tablet, Take 1 tablet/day by mouth Daily., Disp: 30 tablet, Rfl: 11  •  promethazine (PHENERGAN) 12.5 MG tablet, Take 1 tablet by mouth Every 6 (Six) Hours As Needed for Nausea or Vomiting., Disp: 30 tablet, Rfl: 3  •  vitamin B-6 (PYRIDOXINE) 25 MG tablet, Take 1 tablet by mouth Daily. (Patient taking differently: Take 1 tablet by mouth Daily As Needed.), Disp: 30 tablet, Rfl: 3    Allergies:   No Known Allergies    Objective     Physical Exam:  Vital Signs:  "  Vitals:    11/14/22 1101   BP: 122/74   BP Location: Left arm   Patient Position: Sitting   Cuff Size: Adult   Pulse: 92   Temp: 98.4 °F (36.9 °C)   TempSrc: Temporal   Weight: 79.8 kg (176 lb)   Height: 157.5 cm (62.01\")   PainSc:   5   PainLoc: Head     Body mass index is 32.18 kg/m².     Physical Exam  Vitals and nursing note reviewed.   Constitutional:       General: She is not in acute distress.  HENT:      Head: Normocephalic and atraumatic.      Right Ear: Ear canal and external ear normal.      Left Ear: Ear canal and external ear normal.      Ears:      Comments: Bilateral TMs with dull LR     Mouth/Throat:      Mouth: Mucous membranes are moist.      Pharynx: Oropharynx is clear. No oropharyngeal exudate.   Eyes:      General: No scleral icterus.     Conjunctiva/sclera: Conjunctivae normal.   Cardiovascular:      Rate and Rhythm: Normal rate and regular rhythm.   Pulmonary:      Effort: Pulmonary effort is normal. No respiratory distress.      Breath sounds: No wheezing.   Musculoskeletal:      Cervical back: Neck supple. No tenderness.   Lymphadenopathy:      Cervical: No cervical adenopathy.   Skin:     General: Skin is warm.   Neurological:      Mental Status: She is alert.   Psychiatric:         Mood and Affect: Mood normal.         Thought Content: Thought content normal.         Assessment / Plan      Assessment/Plan:   Problems Addressed This Visit    ICD-10-CM ICD-9-CM   1. Nausea and vomiting, unspecified vomiting type  R11.2 787.01   2. Acute cough  R05.1 786.2   3. Upper respiratory tract infection, unspecified type  J06.9 465.9      Acute cough  Suspected likely viral URI  -Flu and COVID test negative in office today  -Recommend rest and hydration  -May use OTC loratadine for symptom management  -Given pregnancy state, recommended to avoid fluid and cold formulas  -Provided work excuse with return to work on 11/16/2022    Nausea/vomiting  -Reiterated need for brat diet, adequate " hydration  -Recommended trial of Gatorade with H2O  -Noted that patient is prescribed promethazine per OB/GYN    -Provided patient education on brat diet     plan of care reviewed with patient at the conclusion of today's visit. Education was provided regarding diagnosis and management.  Patient verbalizes understanding of and agreement with management plan.      Follow Up:   Return if symptoms worsen or fail to improve.        LIZANDRO Yee  [facility] [address] [facility]    Please note that portions of this note were completed with a voice recognition program.    Transcribed from ambient dictation for LIZANDRO Schrader by Uyen Davalos.  11/14/22   14:37 EST    Patient or patient representative verbalized consent to the visit recording.  I have personally performed the services described in this document as transcribed by the above individual, and it is both accurate and complete.

## 2022-11-22 DIAGNOSIS — Z3A.25 25 WEEKS GESTATION OF PREGNANCY: Primary | ICD-10-CM

## 2022-11-22 DIAGNOSIS — M79.7 FIBROMYALGIA: Chronic | ICD-10-CM

## 2022-11-22 DIAGNOSIS — F41.1 GENERALIZED ANXIETY DISORDER: Chronic | ICD-10-CM

## 2022-11-22 RX ORDER — DULOXETIN HYDROCHLORIDE 30 MG/1
30 CAPSULE, DELAYED RELEASE ORAL DAILY
Qty: 30 CAPSULE | Refills: 4 | Status: SHIPPED | OUTPATIENT
Start: 2022-11-22

## 2022-11-22 NOTE — TELEPHONE ENCOUNTER
Refill for generic Cymbalta received. This is the second request. She previously got [facility] from [facility] and it is hard to get an apt there for the refills. She said Dr. Umanzor is aware she takes this medication but needs refills.

## 2022-11-29 ENCOUNTER — ROUTINE PRENATAL (OUTPATIENT)
Dept: OBSTETRICS AND GYNECOLOGY | Facility: CLINIC | Age: 22
End: 2022-11-29

## 2022-11-29 VITALS — DIASTOLIC BLOOD PRESSURE: 68 MMHG | WEIGHT: 176.2 LBS | SYSTOLIC BLOOD PRESSURE: 102 MMHG | BODY MASS INDEX: 32.22 KG/M2

## 2022-11-29 DIAGNOSIS — Z34.00 SUPERVISION OF NORMAL FIRST PREGNANCY, ANTEPARTUM: ICD-10-CM

## 2022-11-29 DIAGNOSIS — Z3A.26 26 WEEKS GESTATION OF PREGNANCY: Primary | ICD-10-CM

## 2022-11-29 LAB
GLUCOSE UR STRIP-MCNC: NEGATIVE MG/DL
PROT UR STRIP-MCNC: NEGATIVE MG/DL

## 2022-11-29 PROCEDURE — 0502F SUBSEQUENT PRENATAL CARE: CPT | Performed by: OBSTETRICS & GYNECOLOGY

## 2022-11-29 RX ORDER — CYCLOBENZAPRINE HCL 10 MG
5 TABLET ORAL EVERY 8 HOURS PRN
Qty: 30 TABLET | Refills: 1 | Status: SHIPPED | OUTPATIENT
Start: 2022-11-29 | End: 2023-02-08

## 2022-11-29 RX ORDER — FAMOTIDINE 20 MG/1
20 TABLET, FILM COATED ORAL DAILY
Qty: 30 TABLET | Refills: 3 | Status: SHIPPED | OUTPATIENT
Start: 2022-11-29 | End: 2023-02-08

## 2022-11-29 NOTE — PATIENT INSTRUCTIONS
Prenatal Care  Prenatal care is health care during pregnancy. It helps you and your unborn baby (fetus) stay as healthy as possible. Prenatal care may be provided by a midwife, a family practice doctor, a mid-level practitioner (nurse practitioner or physician assistant), or a childbirth and pregnancy doctor (obstetrician).  How does this affect me?  During pregnancy, you will be closely monitored for any new conditions that might develop. To lower your risk of pregnancy complications, you and your health care provider will talk about any underlying conditions you have.  How does this affect my baby?  Early and consistent prenatal care increases the chance that your baby will be healthy during pregnancy. Prenatal care lowers the risk that your baby will be:  Born early (prematurely).  Smaller than expected at birth (small for gestational age).  What can I expect at the first prenatal care visit?  Your first prenatal care visit will likely be the longest. You should schedule your first prenatal care visit as soon as you know that you are pregnant. Your first visit is a good time to talk about any questions or concerns you have about pregnancy.  Medical history  At your visit, you and your health care provider will talk about your medical history, including:  Any past pregnancies.  Your family's medical history.  Medical history of the baby's father.  Any long-term (chronic) health conditions you have and how you manage them.  Any surgeries or procedures you have had.  Any current over-the-counter or prescription medicines, herbs, or supplements that you are taking.  Other factors that could pose a risk to your baby, including:  Exposure to harmful chemicals or radiation at work or at home.  Any substance use, including tobacco, alcohol, and drug use.  Your home setting and your stress levels, including:  Exposure to abuse or violence.  Household financial strain.  Your daily health habits, including diet and  exercise.  Tests and screenings  Your health care provider will:  Measure your weight, height, and blood pressure.  Do a physical exam, including a pelvic and breast exam.  Perform blood tests and urine tests to check for:  Urinary tract infection.  Sexually transmitted infections (STIs).  Low iron levels in your blood (anemia).  Blood type and certain proteins on red blood cells (Rh antibodies).  Infections and immunity to viruses, such as hepatitis B and rubella.  HIV (human immunodeficiency virus).  Discuss your options for genetic screening.  Tips about staying healthy  Your health care provider will also give you information about how to keep yourself and your baby healthy, including:  Nutrition and taking vitamins.  Physical activity.  How to manage pregnancy symptoms such as nausea and vomiting (morning sickness).  Infections and substances that may be harmful to your baby and how to avoid them.  Food safety.  Dental care.  Working.  Travel.  Warning signs to watch for and when to call your health care provider.  How often will I have prenatal care visits?  After your first prenatal care visit, you will have regular visits throughout your pregnancy. The visit schedule is often as follows:  Up to week 28 of pregnancy: once every 4 weeks.  28-36 weeks: once every 2 weeks.  After 36 weeks: every week until delivery.  Some women may have visits more or less often depending on any underlying health conditions and the health of the baby.  Keep all follow-up and prenatal care visits. This is important.  What happens during routine prenatal care visits?  Your health care provider will:  Measure your weight and blood pressure.  Check for fetal heart sounds.  Measure the height of your uterus in your abdomen (fundal height). This may be measured starting around week 20 of pregnancy.  Check the position of your baby inside your uterus.  Ask questions about your diet, sleeping patterns, and whether you can feel the baby  move.  Review warning signs to watch for and signs of labor.  Ask about any pregnancy symptoms you are having and how you are dealing with them. Symptoms may include:  Headaches.  Nausea and vomiting.  Vaginal discharge.  Swelling.  Fatigue.  Constipation.  Changes in your vision.  Feeling persistently sad or anxious.  Any discomfort, including back or pelvic pain.  Bleeding or spotting.  Make a list of questions to ask your health care provider at your routine visits.  What tests might I have during prenatal care visits?  You may have blood, urine, and imaging tests throughout your pregnancy, such as:  Urine tests to check for glucose, protein, or signs of infection.  Glucose tests to check for a form of diabetes that can develop during pregnancy (gestational diabetes mellitus). This is usually done around week 24 of pregnancy.  Ultrasounds to check your baby's growth and development, to check for birth defects, and to check your baby's well-being. These can also help to decide when you should deliver your baby.  A test to check for group B strep (GBS) infection. This is usually done around week 36 of pregnancy.  Genetic testing. This may include blood, fluid, or tissue sampling, or imaging tests, such as an ultrasound. Some genetic tests are done during the first trimester and some are done during the second trimester.  What else can I expect during prenatal care visits?  Your health care provider may recommend getting certain vaccines during pregnancy. These may include:  A yearly flu shot (annual influenza vaccine). This is especially important if you will be pregnant during flu season.  Tdap (tetanus, diphtheria, pertussis) vaccine. Getting this vaccine during pregnancy can protect your baby from whooping cough (pertussis) after birth. This vaccine may be recommended between weeks 27 and 36 of pregnancy.  A COVID-19 vaccine.  Later in your pregnancy, your health care provider may give you information  about:  Childbirth and breastfeeding classes.  Choosing a health care provider for your baby.  Umbilical cord banking.  Breastfeeding.  Birth control after your baby is born.  The hospital labor and delivery unit and how to set up a tour.  Registering at the hospital before you go into labor.  Where to find more information  Office on Women's Health: womenshealth.gov  American Pregnancy Association: americanpregnancy.org  March of Dimes: marchofdimes.org  Summary  Prenatal care helps you and your baby stay as healthy as possible during pregnancy.  Your first prenatal care visit will most likely be the longest.  You will have visits and tests throughout your pregnancy to monitor your health and your baby's health.  Bring a list of questions to your visits to ask your health care provider.  Make sure to keep all follow-up and prenatal care visits.  This information is not intended to replace advice given to you by your health care provider. Make sure you discuss any questions you have with your health care provider.  Document Revised: 09/30/2021 Document Reviewed: 09/30/2021  [place] Patient Education © 2022 [employer]

## 2022-11-29 NOTE — PROGRESS NOTES
OB FOLLOW UP  CC- Here for care of pregnancy        Lexy Ortiz is a 22 y.o.  26w2d patient being seen today for her obstetrical follow up visit. Patient reports pelvic pain with walking or moving a certain way and nausea, vomiting has returned and patient takes phenergan and vitamin b6 but states that does not help at all.     Her prenatal care is complicated by (and status) : None  Patient Active Problem List   Diagnosis   • History of chlamydia infection   • Dysmenorrhea   • Generalized anxiety disorder   • Vitamin D deficiency   • Hypomagnesemia   • Fibromyalgia   • Allergic rhinitis due to animal hair and dander   • Vulvar abscess   • Supervision of normal first pregnancy, antepartum       Flu Status: Already given in current flu season  Ultrasound Today: Yes    ROS -   Patient Reports : pelvic pain.   Patient Denies: Loss of Fluid, Vaginal Spotting, Vision Changes, Headaches, Contractions and Epigastric pain  Fetal Movement : normal  All other systems reviewed and are negative.       The additional following portions of the patient's history were reviewed and updated as appropriate: allergies, current medications, past family history, past medical history, past social history, past surgical history and problem list.    I have reviewed and agree with the HPI, ROS, and historical information as entered above. Jett Umanzor MD    /68   Wt 79.9 kg (176 lb 3.2 oz)   LMP 2022   BMI 32.22 kg/m²       EXAM:     Prenatal Vitals  BP: 102/68  Weight: 79.9 kg (176 lb 3.2 oz)   Fetal Heart Rate: 140/US      Fundal Height (cm): 26 cm        Urine Glucose Read-only: Negative  Urine Protein Read-only: Negative       Assessment and Plan    Problem List Items Addressed This Visit        Gravid and     Supervision of normal first pregnancy, antepartum   Other Visit Diagnoses     26 weeks gestation of pregnancy    -  Primary          1. Pregnancy at 26w2d  2. Fetal status  reassuring.  3. anatomy scan completed today and within normal limits.  4. 1 hour gtt, CBC, Antibody screen and TDAP next visit. Instructions given  5. Fetal movement/PTL or Labor precautions  6. Reviewed routine prenatal care with the office and educational materials given  7. Medication(s) Ordered  8. Discussed/encouraged TDAP vaccination after 28 weeks  9. Activity and Exercise discussed.  Return in about 2 weeks (around 12/13/2022) for Routine prenatal care.    Jett Umanzor MD  11/29/2022

## 2022-12-13 ENCOUNTER — ROUTINE PRENATAL (OUTPATIENT)
Dept: OBSTETRICS AND GYNECOLOGY | Facility: CLINIC | Age: 22
End: 2022-12-13

## 2022-12-13 VITALS — DIASTOLIC BLOOD PRESSURE: 78 MMHG | BODY MASS INDEX: 33.28 KG/M2 | SYSTOLIC BLOOD PRESSURE: 122 MMHG | WEIGHT: 182 LBS

## 2022-12-13 DIAGNOSIS — Z34.02 ENCOUNTER FOR SUPERVISION OF NORMAL FIRST PREGNANCY IN SECOND TRIMESTER: Primary | ICD-10-CM

## 2022-12-13 DIAGNOSIS — Z23 NEED FOR TDAP VACCINATION: ICD-10-CM

## 2022-12-13 LAB
EXPIRATION DATE: 0
GLUCOSE UR STRIP-MCNC: NEGATIVE MG/DL
Lab: 0
PROT UR STRIP-MCNC: NEGATIVE MG/DL

## 2022-12-13 PROCEDURE — 90715 TDAP VACCINE 7 YRS/> IM: CPT | Performed by: OBSTETRICS & GYNECOLOGY

## 2022-12-13 PROCEDURE — 90471 IMMUNIZATION ADMIN: CPT | Performed by: OBSTETRICS & GYNECOLOGY

## 2022-12-13 PROCEDURE — 0502F SUBSEQUENT PRENATAL CARE: CPT | Performed by: OBSTETRICS & GYNECOLOGY

## 2022-12-13 NOTE — PROGRESS NOTES
OB FOLLOW UP  CC- Here for care of pregnancy        Lexy Ortiz is a 22 y.o.  28w2d patient being seen today for her obstetrical follow up. Patient reports low back pain. She denies dysuria., heartburn. She is taking OTC medication for treatment currently., numbness in both hands., nausea without vomiting and itching in the hands and feet that is worse at night..     Patient undergoing Glucola testing today. She is due for her testing at 10:10.       MBT: A+  Rhogam: not indicated  28 week packet: reviewed with patient , counseled on fetal movement , pediatrician list reviewed, breast pump discussed and childbirth classes reviewed  TDAP: given today  Flu Status: Already given in current flu season  Ultrasound Today: No    Her prenatal care is complicated by (and status) :    Patient Active Problem List   Diagnosis   • History of chlamydia infection   • Dysmenorrhea   • Generalized anxiety disorder   • Vitamin D deficiency   • Hypomagnesemia   • Fibromyalgia   • Allergic rhinitis due to animal hair and dander   • Vulvar abscess   • Supervision of normal first pregnancy, antepartum         ROS -   Patient Reports : Nausea  Patient Denies: Loss of Fluid, Vaginal Spotting, Vision Changes, Headaches, Vomiting , Contractions and Epigastric pain  Fetal Movement : normal    The additional following portions of the patient's history were reviewed and updated as appropriate: allergies and current medications.    I have reviewed and agree with the HPI, ROS, and historical information as entered above. Jett Umanzor MD    /78   Wt 82.6 kg (182 lb)   LMP 2022   BMI 33.28 kg/m²         EXAM:     Prenatal Vitals  BP: 122/78  Weight: 82.6 kg (182 lb)                   Urine Glucose Read-only: Negative  Urine Protein Read-only: Negative       Assessment and Plan    Problem List Items Addressed This Visit    None  Visit Diagnoses     Encounter for supervision of normal first pregnancy in second  trimester    -  Primary    Relevant Orders    CBC (No Diff)    Gestational Screen 1 Hr ([facility])    Antibody Screen    Need for Tdap vaccination              1. Pregnancy at 28w2d  2. 1 hr Glucola, CBC, and antibody screen today  and TDAP given today  3. Fetal movement/PTL or Labor precautions  4. Lab(s) Ordered  5. Patient is on Prenatal vitamins  6. Reviewed Pre-eclampsia signs/symptoms  7. Activity and Exercise discussed.  Return in about 2 weeks (around 12/27/2022) for Routine prenatal care.    Jett Umanzor MD  12/13/2022

## 2022-12-14 LAB
BLD GP AB SCN SERPL QL: NEGATIVE
ERYTHROCYTE [DISTWIDTH] IN BLOOD BY AUTOMATED COUNT: 12.8 % (ref 12.3–15.4)
GLUCOSE 1H P 50 G GLC PO SERPL-MCNC: 107 MG/DL (ref 65–139)
HCT VFR BLD AUTO: 33.4 % (ref 34–46.6)
HGB BLD-MCNC: 11.2 G/DL (ref 12–15.9)
MCH RBC QN AUTO: 28.4 PG (ref 26.6–33)
MCHC RBC AUTO-ENTMCNC: 33.5 G/DL (ref 31.5–35.7)
MCV RBC AUTO: 84.6 FL (ref 79–97)
PLATELET # BLD AUTO: 166 10*3/MM3 (ref 140–450)
RBC # BLD AUTO: 3.95 10*6/MM3 (ref 3.77–5.28)
WBC # BLD AUTO: 8.85 10*3/MM3 (ref 3.4–10.8)

## 2022-12-27 ENCOUNTER — ROUTINE PRENATAL (OUTPATIENT)
Dept: OBSTETRICS AND GYNECOLOGY | Facility: CLINIC | Age: 22
End: 2022-12-27

## 2022-12-27 VITALS — DIASTOLIC BLOOD PRESSURE: 82 MMHG | BODY MASS INDEX: 33.5 KG/M2 | WEIGHT: 183.2 LBS | SYSTOLIC BLOOD PRESSURE: 128 MMHG

## 2022-12-27 DIAGNOSIS — O36.5930 POOR FETAL GROWTH AFFECTING MANAGEMENT OF MOTHER IN THIRD TRIMESTER, SINGLE OR UNSPECIFIED FETUS: ICD-10-CM

## 2022-12-27 DIAGNOSIS — Z3A.30 30 WEEKS GESTATION OF PREGNANCY: Primary | ICD-10-CM

## 2022-12-27 DIAGNOSIS — Z34.00 SUPERVISION OF NORMAL FIRST PREGNANCY, ANTEPARTUM: ICD-10-CM

## 2022-12-27 LAB
GLUCOSE UR STRIP-MCNC: NEGATIVE MG/DL
PROT UR STRIP-MCNC: NEGATIVE MG/DL

## 2022-12-27 PROCEDURE — 0502F SUBSEQUENT PRENATAL CARE: CPT | Performed by: OBSTETRICS & GYNECOLOGY

## 2022-12-27 RX ORDER — FERROUS SULFATE 325(65) MG
325 TABLET ORAL
Qty: 30 TABLET | Refills: 1 | Status: SHIPPED | OUTPATIENT
Start: 2022-12-27 | End: 2023-02-08

## 2022-12-27 NOTE — PROGRESS NOTES
OB FOLLOW UP  CC- Here for care of pregnancy        Lexy Ortiz is a 22 y.o.  30w2d patient being seen today for her obstetrical follow up visit. Patient reports low back pain. She denies dysuria..   Measuring small today and discussed f/u with US.   Her prenatal care is complicated by (and status) :  None  Patient Active Problem List   Diagnosis   • History of chlamydia infection   • Dysmenorrhea   • Generalized anxiety disorder   • Vitamin D deficiency   • Hypomagnesemia   • Fibromyalgia   • Allergic rhinitis due to animal hair and dander   • Vulvar abscess   • Supervision of normal first pregnancy, antepartum       Flu Status: Already given in current flu season  TDAP status: received at last visit  28 week labs: Reviewed and normal  Ultrasound Today: No  Non Stress Test: No.    ROS -   Patient Reports :  low back pain. She denies dysuria..  Patient Denies: Loss of Fluid, Vaginal Spotting, Vision Changes, Headaches, Nausea , Vomiting , Contractions and Epigastric pain  Fetal Movement : normal  All other systems reviewed and are negative.       The additional following portions of the patient's history were reviewed and updated as appropriate: allergies, current medications, past family history, past medical history, past social history, past surgical history and problem list.    I have reviewed and agree with the HPI, ROS, and historical information as entered above. Jett Umanzor MD    /82   Wt 83.1 kg (183 lb 3.2 oz)   LMP 2022   BMI 33.50 kg/m²       EXAM:     Prenatal Vitals  BP: 128/82  Weight: 83.1 kg (183 lb 3.2 oz)                   Urine Glucose Read-only: Negative  Urine Protein Read-only: Negative       Assessment and Plan    Problem List Items Addressed This Visit        Gravid and     Supervision of normal first pregnancy, antepartum   Other Visit Diagnoses     30 weeks gestation of pregnancy    -  Primary    Poor fetal growth affecting management of  mother in third trimester, single or unspecified fetus        Relevant Orders    US Ob Follow Up Transabdominal Approach          1. Pregnancy at 30w2d  2. Fetal status reassuring.  3. 28 week labs reviewed.    4. Activity and Exercise discussed.  5. Fetal movement/PTL or Labor precautions  6. U/S ordered at follow up  7. Patient is on Prenatal vitamins  8. Reviewed Pre-eclampsia signs/symptoms  Return in about 2 weeks (around 1/10/2023) for Routine prenatal care and growth US.    Jett Umanzor MD  12/27/2022

## 2022-12-30 ENCOUNTER — TELEPHONE (OUTPATIENT)
Dept: OBSTETRICS AND GYNECOLOGY | Facility: CLINIC | Age: 22
End: 2022-12-30

## 2022-12-30 ENCOUNTER — TELEPHONE (OUTPATIENT)
Dept: INTERNAL MEDICINE | Facility: CLINIC | Age: 22
End: 2022-12-30
Payer: COMMERCIAL

## 2022-12-30 NOTE — TELEPHONE ENCOUNTER
Pt. States lightheadedness came on suddenly about 15 minutes ago. She reports having pancakes/syrup and sausage this morning. Denies caffeine or sugary drinks. She is currently sitting down at work. She has eaten some chicken as well. Recommended she resting and drinking water for another 15 minutes. If her blood sugar has spiked and then dropped it may cause this, if not better she will call back.

## 2022-12-30 NOTE — TELEPHONE ENCOUNTER
Patient's sister Kaur called to see if the patient could get a flu and covid test because she does not feel good, is sneezing, and has a sore throat    I let her know that an appointment would be required and we did not have any openings today, I would have to see if we could find an availability since we are closed for the weekend and Monday      Can Dr Guevara see the patient today at 4:15? Please advise    Call Kaur back at [phone]

## 2022-12-30 NOTE — TELEPHONE ENCOUNTER
Pt asked to speak to a nurse  Stated she has been having some light headedness stated she just ate,   Hasn't been able to check blood sugar or blood pressure  Stated she hasn't had blurry vision or spots on her vision  Stated it feels like shes going to pass out

## 2023-01-04 ENCOUNTER — HOSPITAL ENCOUNTER (OUTPATIENT)
Facility: HOSPITAL | Age: 23
Discharge: HOME | End: 2023-01-04
Attending: OBSTETRICS & GYNECOLOGY | Admitting: OBSTETRICS & GYNECOLOGY
Payer: COMMERCIAL

## 2023-01-04 VITALS
SYSTOLIC BLOOD PRESSURE: 110 MMHG | DIASTOLIC BLOOD PRESSURE: 87 MMHG | TEMPERATURE: 97.9 F | HEART RATE: 103 BPM | RESPIRATION RATE: 16 BRPM

## 2023-01-04 PROBLEM — O36.8130 DECREASED FETAL MOVEMENT AFFECTING MANAGEMENT OF PREGNANCY IN THIRD TRIMESTER, SINGLE OR UNSPECIFIED FETUS: Status: ACTIVE | Noted: 2023-01-04

## 2023-01-04 PROCEDURE — 99221 1ST HOSP IP/OBS SF/LOW 40: CPT | Performed by: OBSTETRICS & GYNECOLOGY

## 2023-01-04 PROCEDURE — 59025 FETAL NON-STRESS TEST: CPT | Performed by: OBSTETRICS & GYNECOLOGY

## 2023-01-04 PROCEDURE — 59025 FETAL NON-STRESS TEST: CPT

## 2023-01-04 PROCEDURE — G0463 HOSPITAL OUTPT CLINIC VISIT: HCPCS

## 2023-01-04 NOTE — H&P
Kiara LaNese Jackson  2000  [id number]  [id number]    CC: decreased fetal movement  HPI:  Patient is 22 y.o. female   currently at 31w3d presents with c/o decreased FM.  FM was normal thru 1100 yesterday.  Since that time, pt has felt some movement, just less frequent and less vigorous.  Pt denies significant uterine contractions, vag bleeding, or leaking. Baby is now moving normally.  Pt had busy day at work yesterday.  BPNC to date     PMH:  Current meds: PNV, Fe, cymbalta 30mg/d  Illnesses: fibromyalgia  Surgeries: EGD, colonoscopy  Allergies: NKDA    Past OB History:       OB History    Para Term  AB Living   1 0 0 0 0 0   SAB IAB Ectopic Molar Multiple Live Births   0 0 0 0 0 0      # Outcome Date GA Lbr Keon/2nd Weight Sex Delivery Anes PTL Lv   1 Current                     SH: tob neg , EtOH neg, drugs neg  FH: heart dz pos , diabetes pos , cancer pos    General ROS: decreased FM, contractions, D.   All other systems reviewed and are negative.      Physical Examination: General appearance - alert, well appearing, and in no distress  Vital signs - /87   Pulse 103   Temp 97.9 °F (36.6 °C)   Resp 16   LMP 2022   HEENT: normocephalic, atraumatic,oropharynx clear, appearance of ears and nose normal  Neck - supple, no significant adenopathy, no thyromegaly  Lymphatics - no palpable lymphadenopathy in the neck or groin, no hepatosplenomegaly  Chest - clear to auscultation, no wheezes, rales or rhonchi, respiratory effort non-labored  Heart - normal rate, regular rhythm, no murmurs, rubs, clicks or gallops, no JVD, no lower extremity edema  Abdomen - soft, nontender, nondistended, no masses, no hepatosplenomegaly  no rebound tenderness noted, bowel sounds normal  Vaginal Exam: deferred  Extremities - no pedal edema noted, no calf tend  Skin -warm and dry, normal coloration and turgor, no rashes, no suspicious skin lesions noted    Fetal monitoring: indication decreased fetal  movement , onset 0405 , offset 0445 , baseline 140 , mod BTB variability , multiple accels (15 X 15), no decels, irreg contractions, interpretation reactive NST    Radiology     Assessment 1)IUP 31 3/7 weeks   2)decreased fetal movement- moving well now, reassuring NST   3)fibromyalgia    Plan 1)observe   2)home   3)keep next sched appt    Jerald Saldana MD  1/4/2023  04:50 EST

## 2023-01-10 ENCOUNTER — ROUTINE PRENATAL (OUTPATIENT)
Dept: OBSTETRICS AND GYNECOLOGY | Facility: CLINIC | Age: 23
End: 2023-01-10
Payer: COMMERCIAL

## 2023-01-10 VITALS — BODY MASS INDEX: 33.28 KG/M2 | SYSTOLIC BLOOD PRESSURE: 124 MMHG | DIASTOLIC BLOOD PRESSURE: 82 MMHG | WEIGHT: 182 LBS

## 2023-01-10 DIAGNOSIS — Z34.03 ENCOUNTER FOR SUPERVISION OF NORMAL FIRST PREGNANCY IN THIRD TRIMESTER: Primary | ICD-10-CM

## 2023-01-10 LAB
EXPIRATION DATE: 0
GLUCOSE UR STRIP-MCNC: NEGATIVE MG/DL
Lab: 0
PROT UR STRIP-MCNC: NEGATIVE MG/DL

## 2023-01-10 PROCEDURE — 0502F SUBSEQUENT PRENATAL CARE: CPT | Performed by: OBSTETRICS & GYNECOLOGY

## 2023-01-10 NOTE — PROGRESS NOTES
OB FOLLOW UP  CC- Here for care of pregnancy        Kiara LaNese Jackson is a 22 y.o.  32w2d patient being seen today for her obstetrical follow up visit. Patient reports irregular contractions like cramping , increased vaginal discharge the color is yellow and white, watery consistency. It is not continuous leaking. She denies a large gush of fluid. This started about a week ago and has increased this morning, nausea without vomiting and vaginal pressure/pain..     Her prenatal care is complicated by (and status) :    Patient Active Problem List   Diagnosis   • History of chlamydia infection   • Dysmenorrhea   • Generalized anxiety disorder   • Vitamin D deficiency   • Hypomagnesemia   • Fibromyalgia   • Allergic rhinitis due to animal hair and dander   • Vulvar abscess   • Supervision of normal first pregnancy, antepartum   • Decreased fetal movement affecting management of pregnancy in third trimester, single or unspecified fetus       Flu Status: Already given in current flu season  TDAP status: received at last visit  28 week labs: Reviewed  Ultrasound Today: Yes  Non Stress Test: No.    ROS -   Patient Reports : Headaches, Nausea and increase in dischagre  Patient Denies: Loss of Fluid, Vaginal Spotting, Vision Changes, Vomiting , Contractions and Epigastric pain  Fetal Movement : normal  All other systems reviewed and are negative.       The additional following portions of the patient's history were reviewed and updated as appropriate: allergies and current medications.    I have reviewed and agree with the HPI, ROS, and historical information as entered above. Jett Umanzor MD    /82   Wt 82.6 kg (182 lb)   LMP 2022   BMI 33.28 kg/m²       EXAM:     Prenatal Vitals  BP: 124/82  Weight: 82.6 kg (182 lb)                   Urine Glucose Read-only: Negative  Urine Protein Read-only: Negative       Assessment and Plan    Problem List Items Addressed This Visit    None  Visit  Diagnoses     Encounter for supervision of normal first pregnancy in third trimester    -  Primary    Relevant Orders    POC Glucose, Urine, Qualitative, Dipstick (Completed)    POC Protein, Urine, Qualitative, Dipstick (Completed)          1. Pregnancy at 32w2d  2. Fetal status reassuring.  3. 28 week labs reviewed.    4. Activity and Exercise discussed.  5. Fetal movement/PTL or Labor precautions  6. Patient is on Prenatal vitamins  7. Reviewed Pre-eclampsia signs/symptoms  Return in about 2 weeks (around 1/24/2023) for Routine prenatal care.    Jett Umanzor MD  01/10/2023

## 2023-01-24 ENCOUNTER — ROUTINE PRENATAL (OUTPATIENT)
Dept: OBSTETRICS AND GYNECOLOGY | Facility: CLINIC | Age: 23
End: 2023-01-24
Payer: COMMERCIAL

## 2023-01-24 VITALS — DIASTOLIC BLOOD PRESSURE: 84 MMHG | SYSTOLIC BLOOD PRESSURE: 120 MMHG | BODY MASS INDEX: 33.28 KG/M2 | WEIGHT: 182 LBS

## 2023-01-24 DIAGNOSIS — Z3A.34 34 WEEKS GESTATION OF PREGNANCY: Primary | ICD-10-CM

## 2023-01-24 DIAGNOSIS — O23.43 URINARY TRACT INFECTION IN MOTHER DURING THIRD TRIMESTER OF PREGNANCY: ICD-10-CM

## 2023-01-24 DIAGNOSIS — Z34.00 SUPERVISION OF NORMAL FIRST PREGNANCY, ANTEPARTUM: ICD-10-CM

## 2023-01-24 LAB
BILIRUB BLD-MCNC: NEGATIVE MG/DL
CLARITY, POC: CLEAR
COLOR UR: YELLOW
GLUCOSE UR STRIP-MCNC: NEGATIVE MG/DL
KETONES UR QL: NEGATIVE
LEUKOCYTE EST, POC: ABNORMAL
PH UR: 7 [PH] (ref 5–8)
PROT UR STRIP-MCNC: NEGATIVE MG/DL
RBC # UR STRIP: NEGATIVE /UL
SP GR UR: 1.01 (ref 1–1.03)
UROBILINOGEN UR QL: NORMAL

## 2023-01-24 PROCEDURE — 0502F SUBSEQUENT PRENATAL CARE: CPT | Performed by: OBSTETRICS & GYNECOLOGY

## 2023-01-24 RX ORDER — NITROFURANTOIN 25; 75 MG/1; MG/1
100 CAPSULE ORAL 2 TIMES DAILY
Qty: 14 CAPSULE | Refills: 0 | Status: SHIPPED | OUTPATIENT
Start: 2023-01-24 | End: 2023-01-31

## 2023-01-24 NOTE — PROGRESS NOTES
OB FOLLOW UP  CC- Here for care of pregnancy        Kiara LaNese Jackson is a 22 y.o.  34w2d patient being seen today for her obstetrical follow up visit. Patient reports low back pain. She admits dysuria. and vaginal pressure/pain..     Her prenatal care is complicated by (and status) : None  Patient Active Problem List   Diagnosis   • History of chlamydia infection   • Dysmenorrhea   • Generalized anxiety disorder   • Vitamin D deficiency   • Hypomagnesemia   • Fibromyalgia   • Allergic rhinitis due to animal hair and dander   • Vulvar abscess   • Supervision of normal first pregnancy, antepartum   • Decreased fetal movement affecting management of pregnancy in third trimester, single or unspecified fetus       Flu Status: Already given in current flu season  Ultrasound Today: No  Non Stress Test: No.      ROS -   Patient Reports : lower back pain with dysuria and pelvic pain   Patient Denies: Loss of Fluid, Vaginal Spotting, Vision Changes, Headaches, Nausea , Vomiting , Contractions and Epigastric pain  Fetal Movement : normal  All other systems reviewed and are negative.       The additional following portions of the patient's history were reviewed and updated as appropriate: allergies, current medications, past family history, past medical history, past social history, past surgical history and problem list.    I have reviewed and agree with the HPI, ROS, and historical information as entered above. Jett Umanzor MD    /84   Wt 82.6 kg (182 lb)   LMP 2022   BMI 33.28 kg/m²       EXAM:     Prenatal Vitals  BP: 120/84  Weight: 82.6 kg (182 lb)                   Urine Glucose Read-only: Negative  Urine Protein Read-only: Negative       Assessment and Plan    Problem List Items Addressed This Visit        Gravid and     Supervision of normal first pregnancy, antepartum   Other Visit Diagnoses     34 weeks gestation of pregnancy    -  Primary    Relevant Orders    POC  Urinalysis Dipstick (Completed)    Urinary tract infection in mother during third trimester of pregnancy              1. Pregnancy at 34w2d  2. Fetal status reassuring.   3. Activity and Exercise discussed.  4. Treat UTI with macrobid  5. Fetal movement/PTL or Labor precautions  6. Lab(s) Ordered  7. Medication(s) Ordered  8. Patient is on Prenatal vitamins  9. Reviewed Pre-eclampsia signs/symptoms  10. GBS next visit  Return in about 2 weeks (around 2/7/2023) for Routine prenatal care and GBS. Can see NP that day.    Jett Umanzor MD  01/24/2023

## 2023-01-31 ENCOUNTER — ROUTINE PRENATAL (OUTPATIENT)
Dept: OBSTETRICS AND GYNECOLOGY | Facility: CLINIC | Age: 23
End: 2023-01-31
Payer: COMMERCIAL

## 2023-01-31 ENCOUNTER — TELEPHONE (OUTPATIENT)
Dept: OBSTETRICS AND GYNECOLOGY | Facility: CLINIC | Age: 23
End: 2023-01-31
Payer: COMMERCIAL

## 2023-01-31 VITALS — SYSTOLIC BLOOD PRESSURE: 122 MMHG | BODY MASS INDEX: 33.57 KG/M2 | DIASTOLIC BLOOD PRESSURE: 78 MMHG | WEIGHT: 183.6 LBS

## 2023-01-31 DIAGNOSIS — G44.201 ACUTE INTRACTABLE TENSION-TYPE HEADACHE: ICD-10-CM

## 2023-01-31 DIAGNOSIS — O26.899 CRAMPING AFFECTING PREGNANCY, ANTEPARTUM: ICD-10-CM

## 2023-01-31 DIAGNOSIS — O26.893 VAGINAL DISCHARGE DURING PREGNANCY IN THIRD TRIMESTER: ICD-10-CM

## 2023-01-31 DIAGNOSIS — N89.8 VAGINAL DISCHARGE DURING PREGNANCY IN THIRD TRIMESTER: ICD-10-CM

## 2023-01-31 DIAGNOSIS — R10.9 CRAMPING AFFECTING PREGNANCY, ANTEPARTUM: ICD-10-CM

## 2023-01-31 DIAGNOSIS — Z34.93 THIRD TRIMESTER PREGNANCY: Primary | ICD-10-CM

## 2023-01-31 DIAGNOSIS — R82.998 LEUKOCYTES IN URINE: ICD-10-CM

## 2023-01-31 LAB
BILIRUB BLD-MCNC: NEGATIVE MG/DL
CLARITY, POC: CLEAR
COLOR UR: ABNORMAL
GLUCOSE UR STRIP-MCNC: NEGATIVE MG/DL
KETONES UR QL: NEGATIVE
LEUKOCYTE EST, POC: ABNORMAL
NITRITE UR-MCNC: NEGATIVE MG/ML
PH UR: 7 [PH] (ref 5–8)
PROT UR STRIP-MCNC: NEGATIVE MG/DL
RBC # UR STRIP: NEGATIVE /UL
SP GR UR: 1.01 (ref 1–1.03)
UROBILINOGEN UR QL: ABNORMAL

## 2023-01-31 PROCEDURE — 59025 FETAL NON-STRESS TEST: CPT

## 2023-01-31 PROCEDURE — 0502F SUBSEQUENT PRENATAL CARE: CPT

## 2023-01-31 RX ORDER — AMOXICILLIN AND CLAVULANATE POTASSIUM 875; 125 MG/1; MG/1
1 TABLET, FILM COATED ORAL EVERY 12 HOURS
Qty: 14 TABLET | Refills: 0 | Status: SHIPPED | OUTPATIENT
Start: 2023-01-31 | End: 2023-02-08 | Stop reason: HOSPADM

## 2023-01-31 NOTE — TELEPHONE ENCOUNTER
Patient started having irregular contractions last night that have become more regular and frequent this morning.  She denies lof and reports + FM.  She thinks they are about 6 minutes apart with increased pelvic/back pain and pressure, but states she is having difficulty timing them. We discussed labor precautions and how to time/count contractions.  Appt scheduled with nurse practitioner for evaluation unless contractions become less frequent or stop.

## 2023-01-31 NOTE — PROGRESS NOTES
OB FOLLOW UP  CC- Here for care of pregnancy        Kiara LaNese Jackson is a 22 y.o.  35w2d patient being seen today for contractions. They began last night. They are regular, every 5-10 minutes. She also has complaints of nausea, headache that just began today and mild period like cramping. Patient states she was just treated for UTI and completed last does of medication yesterday. She denies sx r/t UTI.     Her prenatal care is complicated by (and status) :    Patient Active Problem List   Diagnosis   • History of chlamydia infection   • Dysmenorrhea   • Generalized anxiety disorder   • Vitamin D deficiency   • Hypomagnesemia   • Fibromyalgia   • Allergic rhinitis due to animal hair and dander   • Vulvar abscess   • Supervision of normal first pregnancy, antepartum   • Decreased fetal movement affecting management of pregnancy in third trimester, single or unspecified fetus   • Cramping affecting pregnancy, antepartum   • Leukocytes in urine   • Acute intractable tension-type headache   • Vaginal discharge during pregnancy in third trimester          Ultrasound Today: No.  Non Stress Test: Yes minutes >20minutes  non-stress test: NST: Reactive. Irregular contractions  indication: regular contractions    ROS -   Patient Reports : Headaches, Cramping, Nausea and Contractions  Patient Denies: Loss of Fluid, Vaginal Spotting, Vision Changes, Vomiting  and Epigastric pain  Fetal Movement : normal  All other systems reviewed and are negative.       The additional following portions of the patient's history were reviewed and updated as appropriate: allergies and current medications.    I have reviewed and agree with the HPI, ROS, and historical information as entered above. Reina Dowd, APRN    /78   Wt 83.3 kg (183 lb 9.6 oz)   LMP 2022   BMI 33.57 kg/m²       EXAM:     Prenatal Vitals  BP: 122/78  Weight: 83.3 kg (183 lb 9.6 oz)   Fetal Heart Rate: 140    Dilation/Effacement/Station  Dilation: Closed  Effacement (%): 50     Cervix: Closed  Milky white discharge, no pooling     Assessment and Plan    Problem List Items Addressed This Visit        Genitourinary and Reproductive     Leukocytes in urine    Relevant Medications    amoxicillin-clavulanate (Augmentin) 875-125 MG per tablet    Vaginal discharge during pregnancy in third trimester    Relevant Orders    NuSwab VG+ - Swab, Vagina       Gravid and     Cramping affecting pregnancy, antepartum    Relevant Medications    amoxicillin-clavulanate (Augmentin) 875-125 MG per tablet    Other Relevant Orders    POC Urinalysis Dipstick (Completed)    Urine Culture - Urine, Urine, Clean Catch    NuSwab VG+ - Swab, Vagina       Neuro    Acute intractable tension-type headache    Relevant Medications    DULoxetine (CYMBALTA) 30 MG capsule    cyclobenzaprine (FLEXERIL) 10 MG tablet    Other Relevant Orders    AlP+ALT+AST+Creat+LD+TBili+..    CBC (No Diff)   Other Visit Diagnoses     Third trimester pregnancy    -  Primary    Relevant Medications    amoxicillin-clavulanate (Augmentin) 875-125 MG per tablet    Other Relevant Orders    POC Urinalysis Dipstick (Completed)    AlP+ALT+AST+Creat+LD+TBili+..    CBC (No Diff)    NuSwab VG+ - Swab, Vagina          1. Pregnancy at 35w2d  2. Fetal status reassuring.   3. NST reactive today.  4. No signs of ROM. Nitrazine negative. Fern negative. Pooling negative.   5. Labor precautions reviewed.  6. CCUA positive, will treat with abx and send for culture. Will notify patient of results.  7. Increase PO fluids 42heO9S/day  8. Return for regularly scheduled OB appointment.  9. RTC for worsening symptoms   10. Nuswab for discharge  11. Advised Tylenol Sparingly PRN   12. Belly Band for pelvic pressure and cramping  13. PEP and CBC ordered  14. Preeclampsia symptoms reviewed  15. GBS next visit  16. Return in about 6 days (around 2023) for SHELBY w/ CBF.    Reina Dowd,  APRN  01/31/2023

## 2023-01-31 NOTE — TELEPHONE ENCOUNTER
Pt stated she is having contractions, stated they started last night, pt stated awoke at 6 and hasn't stopped since then   Stated coming between 3-5 minutes only lasting about 40-60 seconds   Pt stated she tried water and sitting with her feet elevated and contractions are still coming  Pt stated she is very nauseous  Pt denies water breaking  Pt stated she thinks her mucus plug may have passed but has not been bleeding and has had a lot of diarrhea

## 2023-02-01 LAB
ALP SERPL-CCNC: 149 U/L (ref 39–117)
ALT SERPL-CCNC: 15 U/L (ref 1–33)
AST SERPL-CCNC: 24 U/L (ref 1–32)
BACTERIA UR CULT: NORMAL
BASOPHILS # BLD AUTO: 0.04 10*3/MM3 (ref 0–0.2)
BASOPHILS NFR BLD AUTO: 0.5 % (ref 0–1.5)
BILIRUB SERPL-MCNC: 0.2 MG/DL (ref 0–1.2)
CREAT SERPL-MCNC: 0.64 MG/DL (ref 0.57–1)
EGFRCR SERPLBLD CKD-EPI 2021: 129.1 ML/MIN/1.73
EOSINOPHIL # BLD AUTO: 0.06 10*3/MM3 (ref 0–0.4)
EOSINOPHIL NFR BLD AUTO: 0.7 % (ref 0.3–6.2)
ERYTHROCYTE [DISTWIDTH] IN BLOOD BY AUTOMATED COUNT: 12.9 % (ref 12.3–15.4)
HCT VFR BLD AUTO: 35.9 % (ref 34–46.6)
HGB BLD-MCNC: 11.7 G/DL (ref 12–15.9)
IMM GRANULOCYTES # BLD AUTO: 0.06 10*3/MM3 (ref 0–0.05)
IMM GRANULOCYTES NFR BLD AUTO: 0.7 % (ref 0–0.5)
LDH SERPL L TO P-CCNC: 211 U/L (ref 135–214)
LYMPHOCYTES # BLD AUTO: 1.39 10*3/MM3 (ref 0.7–3.1)
LYMPHOCYTES NFR BLD AUTO: 16.1 % (ref 19.6–45.3)
MCH RBC QN AUTO: 27.1 PG (ref 26.6–33)
MCHC RBC AUTO-ENTMCNC: 32.6 G/DL (ref 31.5–35.7)
MCV RBC AUTO: 83.1 FL (ref 79–97)
MONOCYTES # BLD AUTO: 0.54 10*3/MM3 (ref 0.1–0.9)
MONOCYTES NFR BLD AUTO: 6.3 % (ref 5–12)
NEUTROPHILS # BLD AUTO: 6.55 10*3/MM3 (ref 1.7–7)
NEUTROPHILS NFR BLD AUTO: 75.7 % (ref 42.7–76)
NRBC BLD AUTO-RTO: 0 /100 WBC (ref 0–0.2)
PLATELET # BLD AUTO: 174 10*3/MM3 (ref 140–450)
RBC # BLD AUTO: 4.32 10*6/MM3 (ref 3.77–5.28)
URATE SERPL-MCNC: 3.2 MG/DL (ref 2.4–5.7)
WBC # BLD AUTO: 8.64 10*3/MM3 (ref 3.4–10.8)

## 2023-02-02 LAB
A VAGINAE DNA VAG QL NAA+PROBE: NORMAL SCORE
BVAB2 DNA VAG QL NAA+PROBE: NORMAL SCORE
C ALBICANS DNA VAG QL NAA+PROBE: NEGATIVE
C GLABRATA DNA VAG QL NAA+PROBE: NEGATIVE
C TRACH DNA VAG QL NAA+PROBE: NEGATIVE
Lab: NORMAL
MEGA1 DNA VAG QL NAA+PROBE: NORMAL SCORE
N GONORRHOEA DNA VAG QL NAA+PROBE: NEGATIVE
SPECIMEN STATUS: NORMAL
T VAGINALIS DNA VAG QL NAA+PROBE: NEGATIVE

## 2023-02-02 NOTE — PROGRESS NOTES
Call made to patient to notify her of results and to check on her symptoms. Patient denies regular contractions (Q5 mins), gradually shortened intervals, increase in pain intensity,  lasting (>/=45 sec). She denies vaginal bleeding, LOF, & DFM. Patient informed about UC being canceled. Pt informed we will collect urine for another UC at her next visit. Patient advised to call for any questions or concerns. Pt CAPRI.

## 2023-02-06 ENCOUNTER — HOSPITAL ENCOUNTER (INPATIENT)
Facility: HOSPITAL | Age: 23
LOS: 2 days | Discharge: HOME | End: 2023-02-08
Attending: OBSTETRICS & GYNECOLOGY | Admitting: OBSTETRICS & GYNECOLOGY
Payer: COMMERCIAL

## 2023-02-06 ENCOUNTER — ANESTHESIA EVENT (OUTPATIENT)
Dept: LABOR AND DELIVERY | Facility: HOSPITAL | Age: 23
End: 2023-02-06
Payer: COMMERCIAL

## 2023-02-06 ENCOUNTER — ANESTHESIA (OUTPATIENT)
Dept: LABOR AND DELIVERY | Facility: HOSPITAL | Age: 23
End: 2023-02-06
Payer: COMMERCIAL

## 2023-02-06 DIAGNOSIS — Z34.93 THIRD TRIMESTER PREGNANCY: ICD-10-CM

## 2023-02-06 DIAGNOSIS — O26.899 CRAMPING AFFECTING PREGNANCY, ANTEPARTUM: ICD-10-CM

## 2023-02-06 DIAGNOSIS — R10.9 CRAMPING AFFECTING PREGNANCY, ANTEPARTUM: ICD-10-CM

## 2023-02-06 DIAGNOSIS — R82.998 LEUKOCYTES IN URINE: ICD-10-CM

## 2023-02-06 PROBLEM — O42.919 PRETERM PREMATURE RUPTURE OF MEMBRANES (PPROM) WITH UNKNOWN ONSET OF LABOR: Status: ACTIVE | Noted: 2023-02-06

## 2023-02-06 PROBLEM — Z3A.36 36 WEEKS GESTATION OF PREGNANCY: Status: ACTIVE | Noted: 2023-02-06

## 2023-02-06 LAB
ABO GROUP BLD: NORMAL
ALP SERPL-CCNC: 184 U/L (ref 39–117)
ALT SERPL W P-5'-P-CCNC: 27 U/L (ref 1–33)
AST SERPL-CCNC: 33 U/L (ref 1–32)
BILIRUB SERPL-MCNC: 0.2 MG/DL (ref 0–1.2)
BLD GP AB SCN SERPL QL: NEGATIVE
CREAT SERPL-MCNC: 0.52 MG/DL (ref 0.57–1)
DEPRECATED RDW RBC AUTO: 39.9 FL (ref 37–54)
ERYTHROCYTE [DISTWIDTH] IN BLOOD BY AUTOMATED COUNT: 13 % (ref 12.3–15.4)
HCT VFR BLD AUTO: 38.2 % (ref 34–46.6)
HGB BLD-MCNC: 12.6 G/DL (ref 12–15.9)
LDH SERPL-CCNC: 263 U/L (ref 135–214)
MCH RBC QN AUTO: 27.9 PG (ref 26.6–33)
MCHC RBC AUTO-ENTMCNC: 33 G/DL (ref 31.5–35.7)
MCV RBC AUTO: 84.7 FL (ref 79–97)
PLATELET # BLD AUTO: 180 10*3/MM3 (ref 140–450)
PMV BLD AUTO: 11.6 FL (ref 6–12)
RBC # BLD AUTO: 4.51 10*6/MM3 (ref 3.77–5.28)
RH BLD: POSITIVE
T&S EXPIRATION DATE: NORMAL
URATE SERPL-MCNC: 3.1 MG/DL (ref 2.4–5.7)
WBC NRBC COR # BLD: 9.79 10*3/MM3 (ref 3.4–10.8)

## 2023-02-06 PROCEDURE — 25010000002 ROPIVACAINE PER 1 MG: Performed by: NURSE ANESTHETIST, CERTIFIED REGISTERED

## 2023-02-06 PROCEDURE — C1755 CATHETER, INTRASPINAL: HCPCS

## 2023-02-06 PROCEDURE — 25010000002 OXYTOCIN PER 10 UNITS: Performed by: OBSTETRICS & GYNECOLOGY

## 2023-02-06 PROCEDURE — 82565 ASSAY OF CREATININE: CPT | Performed by: OBSTETRICS & GYNECOLOGY

## 2023-02-06 PROCEDURE — 84550 ASSAY OF BLOOD/URIC ACID: CPT | Performed by: OBSTETRICS & GYNECOLOGY

## 2023-02-06 PROCEDURE — 84075 ASSAY ALKALINE PHOSPHATASE: CPT | Performed by: OBSTETRICS & GYNECOLOGY

## 2023-02-06 PROCEDURE — 82247 BILIRUBIN TOTAL: CPT | Performed by: OBSTETRICS & GYNECOLOGY

## 2023-02-06 PROCEDURE — 83615 LACTATE (LD) (LDH) ENZYME: CPT | Performed by: OBSTETRICS & GYNECOLOGY

## 2023-02-06 PROCEDURE — 25010000002 ONDANSETRON PER 1 MG: Performed by: NURSE ANESTHETIST, CERTIFIED REGISTERED

## 2023-02-06 PROCEDURE — 84450 TRANSFERASE (AST) (SGOT): CPT | Performed by: OBSTETRICS & GYNECOLOGY

## 2023-02-06 PROCEDURE — C1755 CATHETER, INTRASPINAL: HCPCS | Performed by: ANESTHESIOLOGY

## 2023-02-06 PROCEDURE — 0KQM0ZZ REPAIR PERINEUM MUSCLE, OPEN APPROACH: ICD-10-PCS | Performed by: OBSTETRICS & GYNECOLOGY

## 2023-02-06 PROCEDURE — 86901 BLOOD TYPING SEROLOGIC RH(D): CPT | Performed by: OBSTETRICS & GYNECOLOGY

## 2023-02-06 PROCEDURE — 86850 RBC ANTIBODY SCREEN: CPT | Performed by: OBSTETRICS & GYNECOLOGY

## 2023-02-06 PROCEDURE — 84460 ALANINE AMINO (ALT) (SGPT): CPT | Performed by: OBSTETRICS & GYNECOLOGY

## 2023-02-06 PROCEDURE — 51702 INSERT TEMP BLADDER CATH: CPT

## 2023-02-06 PROCEDURE — 59025 FETAL NON-STRESS TEST: CPT

## 2023-02-06 PROCEDURE — 25010000002 PENICILLIN G POTASSIUM PER 600000 UNITS: Performed by: OBSTETRICS & GYNECOLOGY

## 2023-02-06 PROCEDURE — 86900 BLOOD TYPING SEROLOGIC ABO: CPT | Performed by: OBSTETRICS & GYNECOLOGY

## 2023-02-06 PROCEDURE — 85027 COMPLETE CBC AUTOMATED: CPT | Performed by: OBSTETRICS & GYNECOLOGY

## 2023-02-06 PROCEDURE — 59400 OBSTETRICAL CARE: CPT | Performed by: OBSTETRICS & GYNECOLOGY

## 2023-02-06 PROCEDURE — 25010000002 FENTANYL CITRATE (PF) 50 MCG/ML SOLUTION: Performed by: NURSE ANESTHETIST, CERTIFIED REGISTERED

## 2023-02-06 RX ORDER — DIPHENHYDRAMINE HYDROCHLORIDE 50 MG/ML
12.5 INJECTION INTRAMUSCULAR; INTRAVENOUS EVERY 8 HOURS PRN
Status: DISCONTINUED | OUTPATIENT
Start: 2023-02-06 | End: 2023-02-06 | Stop reason: HOSPADM

## 2023-02-06 RX ORDER — ONDANSETRON 4 MG/1
4 TABLET, FILM COATED ORAL EVERY 6 HOURS PRN
Status: DISCONTINUED | OUTPATIENT
Start: 2023-02-06 | End: 2023-02-06 | Stop reason: HOSPADM

## 2023-02-06 RX ORDER — CARBOPROST TROMETHAMINE 250 UG/ML
250 INJECTION, SOLUTION INTRAMUSCULAR
Status: DISCONTINUED | OUTPATIENT
Start: 2023-02-06 | End: 2023-02-06 | Stop reason: HOSPADM

## 2023-02-06 RX ORDER — ONDANSETRON 4 MG/1
4 TABLET, FILM COATED ORAL EVERY 8 HOURS PRN
Status: DISCONTINUED | OUTPATIENT
Start: 2023-02-06 | End: 2023-02-08 | Stop reason: HOSPADM

## 2023-02-06 RX ORDER — SODIUM CHLORIDE 0.9 % (FLUSH) 0.9 %
10 SYRINGE (ML) INJECTION AS NEEDED
Status: DISCONTINUED | OUTPATIENT
Start: 2023-02-06 | End: 2023-02-06 | Stop reason: HOSPADM

## 2023-02-06 RX ORDER — MAGNESIUM CARB/ALUMINUM HYDROX 105-160MG
30 TABLET,CHEWABLE ORAL ONCE
Status: DISCONTINUED | OUTPATIENT
Start: 2023-02-06 | End: 2023-02-06 | Stop reason: HOSPADM

## 2023-02-06 RX ORDER — PENICILLIN G 3000000 [IU]/50ML
3 INJECTION, SOLUTION INTRAVENOUS EVERY 4 HOURS
Status: DISCONTINUED | OUTPATIENT
Start: 2023-02-06 | End: 2023-02-06 | Stop reason: HOSPADM

## 2023-02-06 RX ORDER — ROPIVACAINE HYDROCHLORIDE 2 MG/ML
15 INJECTION, SOLUTION EPIDURAL; INFILTRATION; PERINEURAL CONTINUOUS
Status: DISCONTINUED | OUTPATIENT
Start: 2023-02-06 | End: 2023-02-07

## 2023-02-06 RX ORDER — SODIUM CHLORIDE 0.9 % (FLUSH) 0.9 %
10 SYRINGE (ML) INJECTION EVERY 12 HOURS SCHEDULED
Status: DISCONTINUED | OUTPATIENT
Start: 2023-02-06 | End: 2023-02-06 | Stop reason: HOSPADM

## 2023-02-06 RX ORDER — OXYTOCIN/0.9 % SODIUM CHLORIDE 30/500 ML
2-20 PLASTIC BAG, INJECTION (ML) INTRAVENOUS
Status: DISCONTINUED | OUTPATIENT
Start: 2023-02-06 | End: 2023-02-06 | Stop reason: HOSPADM

## 2023-02-06 RX ORDER — EPHEDRINE SULFATE 5 MG/ML
10 INJECTION INTRAVENOUS
Status: DISCONTINUED | OUTPATIENT
Start: 2023-02-06 | End: 2023-02-06 | Stop reason: HOSPADM

## 2023-02-06 RX ORDER — TRISODIUM CITRATE DIHYDRATE AND CITRIC ACID MONOHYDRATE 500; 334 MG/5ML; MG/5ML
30 SOLUTION ORAL ONCE
Status: DISCONTINUED | OUTPATIENT
Start: 2023-02-06 | End: 2023-02-06 | Stop reason: HOSPADM

## 2023-02-06 RX ORDER — LIDOCAINE HYDROCHLORIDE 10 MG/ML
5 INJECTION, SOLUTION EPIDURAL; INFILTRATION; INTRACAUDAL; PERINEURAL AS NEEDED
Status: DISCONTINUED | OUTPATIENT
Start: 2023-02-06 | End: 2023-02-06 | Stop reason: HOSPADM

## 2023-02-06 RX ORDER — FENTANYL CITRATE 50 UG/ML
INJECTION, SOLUTION INTRAMUSCULAR; INTRAVENOUS
Status: DISCONTINUED
Start: 2023-02-06 | End: 2023-02-08 | Stop reason: HOSPADM

## 2023-02-06 RX ORDER — DULOXETIN HYDROCHLORIDE 30 MG/1
30 CAPSULE, DELAYED RELEASE ORAL NIGHTLY
Status: DISCONTINUED | OUTPATIENT
Start: 2023-02-06 | End: 2023-02-08 | Stop reason: HOSPADM

## 2023-02-06 RX ORDER — METOCLOPRAMIDE HYDROCHLORIDE 5 MG/ML
10 INJECTION INTRAMUSCULAR; INTRAVENOUS ONCE AS NEEDED
Status: DISCONTINUED | OUTPATIENT
Start: 2023-02-06 | End: 2023-02-06 | Stop reason: HOSPADM

## 2023-02-06 RX ORDER — ONDANSETRON 2 MG/ML
4 INJECTION INTRAMUSCULAR; INTRAVENOUS EVERY 6 HOURS PRN
Status: DISCONTINUED | OUTPATIENT
Start: 2023-02-06 | End: 2023-02-06 | Stop reason: HOSPADM

## 2023-02-06 RX ORDER — DULOXETIN HYDROCHLORIDE 30 MG/1
30 CAPSULE, DELAYED RELEASE ORAL DAILY
Status: DISCONTINUED | OUTPATIENT
Start: 2023-02-06 | End: 2023-02-06

## 2023-02-06 RX ORDER — METHYLERGONOVINE MALEATE 0.2 MG/ML
200 INJECTION INTRAVENOUS ONCE AS NEEDED
Status: DISCONTINUED | OUTPATIENT
Start: 2023-02-06 | End: 2023-02-06 | Stop reason: HOSPADM

## 2023-02-06 RX ORDER — LIDOCAINE HYDROCHLORIDE AND EPINEPHRINE 15; 5 MG/ML; UG/ML
INJECTION, SOLUTION EPIDURAL AS NEEDED
Status: DISCONTINUED | OUTPATIENT
Start: 2023-02-06 | End: 2023-02-06 | Stop reason: SURG

## 2023-02-06 RX ORDER — HYDROCORTISONE 25 MG/G
1 CREAM TOPICAL AS NEEDED
Status: DISCONTINUED | OUTPATIENT
Start: 2023-02-06 | End: 2023-02-08 | Stop reason: HOSPADM

## 2023-02-06 RX ORDER — SODIUM CHLORIDE, SODIUM LACTATE, POTASSIUM CHLORIDE, CALCIUM CHLORIDE 600; 310; 30; 20 MG/100ML; MG/100ML; MG/100ML; MG/100ML
125 INJECTION, SOLUTION INTRAVENOUS CONTINUOUS
Status: DISCONTINUED | OUTPATIENT
Start: 2023-02-06 | End: 2023-02-07

## 2023-02-06 RX ORDER — OXYTOCIN/0.9 % SODIUM CHLORIDE 30/500 ML
250 PLASTIC BAG, INJECTION (ML) INTRAVENOUS CONTINUOUS
Status: ACTIVE | OUTPATIENT
Start: 2023-02-06 | End: 2023-02-06

## 2023-02-06 RX ORDER — HYDROCODONE BITARTRATE AND ACETAMINOPHEN 5; 325 MG/1; MG/1
1 TABLET ORAL EVERY 4 HOURS PRN
Status: DISCONTINUED | OUTPATIENT
Start: 2023-02-06 | End: 2023-02-08 | Stop reason: HOSPADM

## 2023-02-06 RX ORDER — IBUPROFEN 600 MG/1
600 TABLET ORAL EVERY 6 HOURS PRN
Status: DISCONTINUED | OUTPATIENT
Start: 2023-02-06 | End: 2023-02-08 | Stop reason: HOSPADM

## 2023-02-06 RX ORDER — OXYTOCIN/0.9 % SODIUM CHLORIDE 30/500 ML
999 PLASTIC BAG, INJECTION (ML) INTRAVENOUS ONCE
Status: DISCONTINUED | OUTPATIENT
Start: 2023-02-06 | End: 2023-02-06 | Stop reason: HOSPADM

## 2023-02-06 RX ORDER — EPHEDRINE SULFATE 5 MG/ML
INJECTION INTRAVENOUS
Status: DISCONTINUED
Start: 2023-02-06 | End: 2023-02-08 | Stop reason: HOSPADM

## 2023-02-06 RX ORDER — FENTANYL CITRATE 50 UG/ML
INJECTION, SOLUTION INTRAMUSCULAR; INTRAVENOUS AS NEEDED
Status: DISCONTINUED | OUTPATIENT
Start: 2023-02-06 | End: 2023-02-06 | Stop reason: SURG

## 2023-02-06 RX ORDER — BISACODYL 10 MG
10 SUPPOSITORY, RECTAL RECTAL DAILY PRN
Status: DISCONTINUED | OUTPATIENT
Start: 2023-02-07 | End: 2023-02-08 | Stop reason: HOSPADM

## 2023-02-06 RX ORDER — DIPHENHYDRAMINE HCL 25 MG
25 CAPSULE ORAL NIGHTLY PRN
Status: DISCONTINUED | OUTPATIENT
Start: 2023-02-06 | End: 2023-02-08 | Stop reason: HOSPADM

## 2023-02-06 RX ORDER — ONDANSETRON 2 MG/ML
4 INJECTION INTRAMUSCULAR; INTRAVENOUS ONCE AS NEEDED
Status: COMPLETED | OUTPATIENT
Start: 2023-02-06 | End: 2023-02-06

## 2023-02-06 RX ORDER — FAMOTIDINE 10 MG/ML
20 INJECTION, SOLUTION INTRAVENOUS ONCE AS NEEDED
Status: DISCONTINUED | OUTPATIENT
Start: 2023-02-06 | End: 2023-02-06 | Stop reason: HOSPADM

## 2023-02-06 RX ORDER — ACETAMINOPHEN 325 MG/1
650 TABLET ORAL EVERY 4 HOURS PRN
Status: DISCONTINUED | OUTPATIENT
Start: 2023-02-06 | End: 2023-02-08 | Stop reason: HOSPADM

## 2023-02-06 RX ORDER — MISOPROSTOL 200 UG/1
800 TABLET ORAL ONCE AS NEEDED
Status: DISCONTINUED | OUTPATIENT
Start: 2023-02-06 | End: 2023-02-06 | Stop reason: HOSPADM

## 2023-02-06 RX ORDER — DOCUSATE SODIUM 100 MG/1
100 CAPSULE, LIQUID FILLED ORAL 2 TIMES DAILY
Status: DISCONTINUED | OUTPATIENT
Start: 2023-02-06 | End: 2023-02-08 | Stop reason: HOSPADM

## 2023-02-06 RX ORDER — SODIUM CHLORIDE 0.9 % (FLUSH) 0.9 %
1-10 SYRINGE (ML) INJECTION AS NEEDED
Status: DISCONTINUED | OUTPATIENT
Start: 2023-02-06 | End: 2023-02-08 | Stop reason: HOSPADM

## 2023-02-06 RX ADMIN — OXYTOCIN 2 MILLI-UNITS/MIN: 10 INJECTION, SOLUTION INTRAMUSCULAR; INTRAVENOUS at 06:32

## 2023-02-06 RX ADMIN — IBUPROFEN 600 MG: 600 TABLET ORAL at 15:22

## 2023-02-06 RX ADMIN — SODIUM CHLORIDE, POTASSIUM CHLORIDE, SODIUM LACTATE AND CALCIUM CHLORIDE 999 ML/HR: 600; 310; 30; 20 INJECTION, SOLUTION INTRAVENOUS at 07:17

## 2023-02-06 RX ADMIN — LIDOCAINE HYDROCHLORIDE AND EPINEPHRINE 3 ML: 15; 5 INJECTION, SOLUTION EPIDURAL at 07:45

## 2023-02-06 RX ADMIN — DOCUSATE SODIUM 100 MG: 100 CAPSULE, LIQUID FILLED ORAL at 21:40

## 2023-02-06 RX ADMIN — IBUPROFEN 600 MG: 600 TABLET ORAL at 21:40

## 2023-02-06 RX ADMIN — ACETAMINOPHEN 325MG 650 MG: 325 TABLET ORAL at 18:07

## 2023-02-06 RX ADMIN — ONDANSETRON 4 MG: 2 INJECTION INTRAMUSCULAR; INTRAVENOUS at 10:14

## 2023-02-06 RX ADMIN — ACETAMINOPHEN 325MG 650 MG: 325 TABLET ORAL at 23:59

## 2023-02-06 RX ADMIN — ROPIVACAINE HYDROCHLORIDE 15 ML/HR: 2 INJECTION, SOLUTION EPIDURAL; INFILTRATION at 07:53

## 2023-02-06 RX ADMIN — FENTANYL CITRATE 100 MCG: 50 INJECTION, SOLUTION INTRAMUSCULAR; INTRAVENOUS at 07:48

## 2023-02-06 RX ADMIN — PENICILLIN G 3 MILLION UNITS: 3000000 INJECTION, SOLUTION INTRAVENOUS at 09:45

## 2023-02-06 RX ADMIN — ROPIVACAINE HYDROCHLORIDE 10 ML: 5 INJECTION, SOLUTION EPIDURAL; INFILTRATION; PERINEURAL at 07:52

## 2023-02-06 RX ADMIN — DULOXETINE 30 MG: 30 CAPSULE, DELAYED RELEASE ORAL at 21:39

## 2023-02-06 RX ADMIN — SODIUM CHLORIDE, POTASSIUM CHLORIDE, SODIUM LACTATE AND CALCIUM CHLORIDE 125 ML/HR: 600; 310; 30; 20 INJECTION, SOLUTION INTRAVENOUS at 08:32

## 2023-02-06 RX ADMIN — SODIUM CHLORIDE 5 MILLION UNITS: 900 INJECTION INTRAVENOUS at 06:17

## 2023-02-06 NOTE — ANESTHESIA PROCEDURE NOTES
Labor Epidural      Patient reassessed immediately prior to procedure    Patient location during procedure: floor  Performed By  CRNA/KYAW: [place] CRNA  Preanesthetic Checklist  Completed: patient identified, IV checked, site marked, risks and benefits discussed, surgical consent, monitors and equipment checked, pre-op evaluation and timeout performed  Prep:  Pt Position:sitting  Sterile Tech:cap, gloves, mask and sterile barrier  Prep:DuraPrep  Monitoring:blood pressure monitoring  Epidural Block Procedure:  Approach:midline  Guidance:landmark technique and palpation technique  Location:L2-L3  Needle Type:Tuohy  Needle Gauge:17 G  Loss of Resistance Medium: air  Loss of Resistance: 5cm  Cath Depth at skin:10 cm  Paresthesia: none  Aspiration:negative  Test Dose:negative  Number of Attempts: 1  Post Assessment:  Dressing:occlusive dressing applied and secured with tape  Pt Tolerance:patient tolerated the procedure well with no apparent complications  Complications:no

## 2023-02-06 NOTE — ANESTHESIA PREPROCEDURE EVALUATION
Anesthesia Evaluation     Patient summary reviewed and Nursing notes reviewed   NPO Solid Status: > 6 hours  NPO Liquid Status: > 2 hours           Airway   Mallampati: II  TM distance: >3 FB  Neck ROM: full  Dental      Pulmonary - negative pulmonary ROS   Cardiovascular - negative cardio ROS        Neuro/Psych  (+) headaches, psychiatric history Anxiety and Depression,    GI/Hepatic/Renal/Endo - negative ROS     Musculoskeletal (-) negative ROS    Abdominal    Substance History - negative use     OB/GYN    (+) Pregnant,         Other - negative ROS                       Anesthesia Plan    ASA 2     epidural       Anesthetic plan, risks, benefits, and alternatives have been provided, discussed and informed consent has been obtained with: patient.    Plan discussed with attending.        CODE STATUS:

## 2023-02-06 NOTE — LACTATION NOTE
02/06/23 1645   Maternal Information   Date of Referral 02/06/23   Person Making Referral lactation consultant   Maternal Reason for Referral no prior breastfeeding experience  (Infant has not  yet.  Attempted at breast.  Latch and few sucks for approximately 3 mintues in CC hold on right breast.  Initiated mom pumping on hospital pump post feeds/attempts.  Breastfeeding education provided, information given.)   Infant Reason for Referral 35-37 weeks gestation;other (see comments)  (Mom has hands free pump at home that was purchased as a baby shower gift.  Will give S2 pump tomorrow.)   Maternal Assessment   Breast Size Issue none   Breast Shape Bilateral:;round   Breast Density Bilateral:;soft   Nipples Bilateral:;everted   Left Nipple Symptoms intact;nontender   Right Nipple Symptoms intact;nontender   Maternal Infant Feeding   Maternal Emotional State receptive;relaxed   Infant Positioning cross-cradle;clutch/football   Signs of Milk Transfer other (see comments)  (colostrum on face)   Pain with Feeding no   Comfort Measures Before/During Feeding infant position adjusted;maternal position adjusted;latch adjusted   Nipple Shape After Feeding, Right round;symmetrical;appropriately projected   Latch Assistance full assistance needed;verbal guidance offered   Support Person Involvement actively supporting mother;verbally supports mother   Milk Expression/Equipment   Breast Pump Type double electric, hospital grade   Breast Pump Flange Type hard   Breast Pump Flange Size 24 mm   Equipment for Home Use breast pump provided;needs breast pump   Breast Pumping   Breast Pumping Interventions post-feed pumping encouraged;frequent pumping encouraged

## 2023-02-06 NOTE — H&P
History and Physical:    Subjective     Chief Complaint   Patient presents with   • Leaking Fluid       Kiara LaNese Jackson is a 22 y.o. year old  with an Estimated Date of Delivery: 3/5/23 currently at 36w1d presenting with irregular contractions and leaking fluid.    Prenatal care has been with Jett Umanzor MD.  It has been significant for No Complications.      Review of Systems   Constitutional: Negative.    HENT: Negative.    Respiratory: Negative.    Cardiovascular: Negative.    Gastrointestinal: Negative.    Genitourinary: Negative.    Musculoskeletal: Negative.    Skin: Negative.    Allergic/Immunologic: Negative.    Neurological: Negative.    Hematological: Negative.    Psychiatric/Behavioral: Negative.            Past Medical History:   Diagnosis Date   • Anxiety    • Depression    • Fibromyalgia    • Generalized anxiety disorder 2021    With panic attacks   • Headache    • Irregular menses    • Menorrhagia    • Ovarian cyst    • Vitamin D deficiency 2021: 25 OH Vit D 10.2.     Past Surgical History:   Procedure Laterality Date   • COLONOSCOPY     • DENTAL PROCEDURE     • ENDOSCOPY     • NO PAST SURGERIES       Family History   Problem Relation Age of Onset   • Cancer Father         unknown type   • Cancer Maternal Grandmother         unknown type   • Hypertension Other      Social History     Tobacco Use   • Smoking status: Never   • Smokeless tobacco: Never   Vaping Use   • Vaping Use: Never used   Substance Use Topics   • Alcohol use: Not Currently     Comment: Socially, less than once a months    • Drug use: Not Currently     Types: Marijuana     Medications Prior to Admission   Medication Sig Dispense Refill Last Dose   • amoxicillin-clavulanate (Augmentin) 875-125 MG per tablet Take 1 tablet by mouth Every 12 (Twelve) Hours for 7 days. 14 tablet 0 2023   • DULoxetine (CYMBALTA) 30 MG capsule Take 1 capsule by mouth Daily. 30 capsule 4 2023   • ferrous  sulfate 325 (65 FE) MG tablet Take 1 tablet by mouth Daily With Breakfast. 30 tablet 1 Past Week   • Prenatal Vit-Fe Fumarate-FA (Prenatal Vitamin) 27-0.8 MG tablet Take 1 tablet/day by mouth Daily. 30 tablet 11 2023   • albuterol sulfate  (90 Base) MCG/ACT inhaler Inhale 2 puffs Every 4 (Four) Hours As Needed.   More than a month   • cyclobenzaprine (FLEXERIL) 10 MG tablet Take 0.5 tablets by mouth Every 8 (Eight) Hours As Needed for Muscle Spasms. 30 tablet 1 Unknown   • famotidine (Pepcid) 20 MG tablet Take 1 tablet by mouth Daily. 30 tablet 3    • promethazine (PHENERGAN) 12.5 MG tablet Take 1 tablet by mouth Every 6 (Six) Hours As Needed for Nausea or Vomiting. 30 tablet 3    • vitamin B-6 (PYRIDOXINE) 25 MG tablet Take 1 tablet by mouth Daily. (Patient taking differently: Take 25 mg by mouth Daily As Needed.) 30 tablet 3      Allergies:  Patient has no known allergies.  OB History    Para Term  AB Living   1 0 0 0 0 0   SAB IAB Ectopic Molar Multiple Live Births   0 0 0 0 0 0      # Outcome Date GA Lbr [place]/2nd Weight Sex Delivery Anes PTL Lv   1 Current                      Objective     /92   Pulse 83   Temp 98.8 °F (37.1 °C) (Oral)   LMP 2022   Breastfeeding No     Physical Exam    General:  No acute distress   Lung: Clear to auscultation bilaterally, no wheezing, clear at bases   Heart: Regular rate and rhythm, no murmurs    Abdomen: Gravid, nontender   Extremities: 2+ DTR's bilaterally, no edema   FHT's: reactive    Cervix:  3-4/80/-1.  Gross rupture   [place]: Contraction are irregular           Lab Review   External Prenatal Results     Pregnancy Outside Results - Transcribed From Office Records - See Scanned Records For Details     Test Value Date Time    ABO  A  22 1450    Rh  Positive  22 1450    Antibody Screen  Negative  22 0947       Negative  22 1450    Varicella IgG       Rubella  5.47 index 22 1450    Hgb  12.6 g/dL 23  0537       11.7 g/dL 01/31/23 1259       11.2 g/dL 12/13/22 0947       13.2 g/dL 07/20/22 1450    Hct  38.2 % 02/06/23 0537       35.9 % 01/31/23 1259       33.4 % 12/13/22 0947       40.3 % 07/20/22 1450    Glucose Fasting GTT       Glucose Tolerance Test 1 hour       Glucose Tolerance Test 3 hour       Gonorrhea (discrete)  Negative  01/31/23     Chlamydia (discrete)  Negative  01/31/23     RPR  Non Reactive  07/20/22 1450    VDRL       Syphilis Antibody       HBsAg  Negative  07/20/22 1450    Herpes Simplex Virus PCR       Herpes Simplex VIrus Culture       HIV  Non Reactive  07/20/22 1450    Hep C RNA Quant PCR       Hep C Antibody  0.1 s/co ratio 07/20/22 1450    AFP       Group B Strep       GBS Susceptibility to Clindamycin       GBS Susceptibility to Erythromycin       Fetal Fibronectin       Genetic Testing, Maternal Blood             Drug Screening     Test Value Date Time    Urine Drug Screen       Amphetamine Screen  Negative ng/mL 07/20/22 1450    Barbiturate Screen  Negative ng/mL 07/20/22 1450    Benzodiazepine Screen  Negative ng/mL 07/20/22 1450    Methadone Screen  Negative ng/mL 07/20/22 1450    Phencyclidine Screen  Negative ng/mL 07/20/22 1450    Opiates Screen       THC Screen       Cocaine Screen       Propoxyphene Screen  Negative ng/mL 07/20/22 1450    Buprenorphine Screen       Methamphetamine Screen       Oxycodone Screen       Tricyclic Antidepressants Screen             Legend    ^: Historical                            Lab Results   Component Value Date    ALKPHOS 149 (H) 01/31/2023    ALT 15 01/31/2023    AST 24 01/31/2023    CREATININE 0.64 01/31/2023    BILITOT 0.2 01/31/2023     01/31/2023    URICACID 3.2 01/31/2023     Lab Results   Component Value Date    WBC 9.79 02/06/2023    HGB 12.6 02/06/2023    HCT 38.2 02/06/2023    MCV 84.7 02/06/2023     02/06/2023        Results for orders placed in visit on 01/10/23     Ob Follow Up Transabdominal  Approach    Narrative  PAT NAME: JULIAN BRUCE  [facility] REC#: [id number]  BIRTH DA: 2000  PAT GEND: F  ACCOUNT#: [id number]  PAT TYPE: O  EXAM PAVITHRA: [id number]  REF PHYS LIBBY DRAKE  ACCESSION [id number]      Indication  ========    Large for Dates      Comparison Studies  =================    The findings of this study are compared to the prior ultrasound study dated , 11/29/2022      Method  =======    [place] E6, Transabdominal ultrasound examination. View: Adequate view      Pregnancy  =========    Harp pregnancy. Number of fetuses: 1      Dating  ======    LMP on: 5/29/2022  GA by LMP 32 w + 2 d  AAMIR by LMP: 3/5/2023  Method of dating: based on stated AAMIR  GA by prior assessment 32 w + 2 d  AAMIR by prior assessment: 3/5/2023  Ultrasound examination on: 1/10/2023  GA by U/S based upon: AC, BPD, Femur, HC  GA by U/S 32 w + 5 d  AAMIR by U/S: 3/2/2023  Previous dating: based on stated AAMIR, selected on 11/29/2022  Agreed AAMIR of previous dating: 3/5/2023  Assigned: based on stated AAMIR, selected on 01/10/2023  Assigned GA 32 w + 2 d  Assigned AAMIR: 3/5/2023  Pregnancy length 280 d      General Evaluation  ================    Cardiac activity present.  bpm.  Fetal movements visualized.  Presentation cephalic.  Placenta Placental site: anterior.  Umbilical cord Cord vessels: 3 vessel cord.  Amniotic fluid Amount of AF: normal. MVP 6.2 cm. MARICARMEN 19.0 cm. Q1 5.7 cm, Q2 6.2 cm, Q3 4.2 cm, Q4 2.9 cm.      Fetal Biometry  ============    Standard  BPD 82.0 mm  33w 0d        63%        Hadlock    .6 mm  32w 6d        67%        Audra    .5 mm  32w 0d        10%        Hadlock    .6 mm  33w 5d        87%        Hadlock    Femur 61.6 mm  32w 0d        28%        Hadlock    HC / AC 0.98    EFW 2,091 g  57%        Kieran    EFW (lb) 4 lb  EFW (oz) 10 oz  EFW by: Hadlock (BPD-HC-AC-FL)  Other: An ultrasound for fetal weight has a margin of error of up to twenty percent.  Head / Face /  Neck  Cephalic index 0.81  60%        Nicolaides    Extremities / Bony Struc  FL / BPD 0.75    FL / HC 0.21    FL / AC 0.21    Other Structures   bpm      Fetal Anatomy  ============    Lateral ventricles: Appears normal  4-chamber view: Appears normal  Stomach: Appears normal  Kidneys: Appears normal  Bladder: Appears normal  Gender: male  Wants to know gender: yes      Impression  ==========    There is appropriate interval fetal growth & normal amniotic fluid quantity      Recommendation  ===============    follow up imaging as clinically indicated        Sonographer: Sugey Adams RDMS  Physician: Jett Umanzor MD, FACOG    Electronically signed by: Jett Umanzor MD, FACOG at: 2023/01/10 10:06              Patient Active Problem List    Diagnosis Date Noted   • * premature rupture of membranes (PPROM) with unknown onset of labor 2023   • Cramping affecting pregnancy, antepartum 2023   • Leukocytes in urine 2023   • Acute intractable tension-type headache 2023   • Vaginal discharge during pregnancy in third trimester 2023   • Decreased fetal movement affecting management of pregnancy in third trimester, single or unspecified fetus 2023   • Supervision of normal first pregnancy, antepartum 2022   • Vulvar abscess 2022   • Allergic rhinitis due to animal hair and dander 10/28/2021   • Fibromyalgia 2021   • Hypomagnesemia 2021   • Generalized anxiety disorder 2021     Note Last Updated: 2021     With panic attacks     • Vitamin D deficiency 2021     Note Last Updated: 2021: 25 OH Vit D 10.2.     • Dysmenorrhea 2021   • History of chlamydia infection 10/23/2020        Assessment & Plan     ASSESSMENT  1. IUP at 36w1d  2. rupture of membranes-P PROM   3. GBBSUnknown.    PLAN  1. Admit to labor and delivery   2.  pitocin and antibiotics for GBBS prophylaxis         Sumi Kraus  MD  2/6/2023@

## 2023-02-06 NOTE — L&D DELIVERY NOTE
[facility]   Vaginal Delivery Note    Patient Name: Kiara LaNese Jackson  : 2000  MRN: [id number]    Date of Delivery: 2023     Diagnosis     Pre & Post-Delivery:  Intrauterine pregnancy at 36w1d  Labor status: Spontaneous Onset of Labor      premature rupture of membranes (PPROM) with unknown onset of labor             Problem List    Transfer to Postpartum     Review the Delivery Report for details.     Delivery     Delivery: Vaginal, Spontaneous     YOB: 2022    Time of Birth:  Gestational Age 1:41 PM   36w1d     Anesthesia: Epidural     Delivering clinician:     Forceps?   No   Vacuum? No    Shoulder dystocia present: No        Delivery narrative:  Labored down for 30 minutes; short second stage. Pushed well with epidural; Controlled delivery or vertex after delivering posterior arm. Small second degree laceration of vagina repaired.       Infant     Findings: male  infant     Infant observations: Weight: 2965 g (6 lb 8.6 oz)   Length: 19.5  in  Observations/Comments:        Apgars:   @ 1 minute /      @ 5 minutes   Infant Name: Griselda     Placenta & Cord         Placenta delivered  Spontaneous  at   2023  1:45 PM     Cord:   present.   Nuchal Cord?  no   Cord blood obtained:     Cord gases obtained:      Cord gas results: Venous:  No results found for: PHCVEN    Arterial:  No results found for: PHCART     Repair      Episiotomy: Not recorded     No    Lacerations: Yes  Laceration Information  Laceration Repaired?   Perineal:       Periurethral:       Labial:       Sulcus:       Vaginal: Yes      Cervical:         Suture used for repair: 2-0 Vicryl   Estimated Blood Loss:  100 ml     Quantitative Blood Loss:  100 ml        Complications     none    Disposition     Mother to Mother Baby/Postpartum  in stable condition currently.  Baby to NBN  in stable condition currently.    Tab Cruz MD  23  14:01 EST

## 2023-02-07 LAB
BASOPHILS # BLD AUTO: 0.05 10*3/MM3 (ref 0–0.2)
BASOPHILS NFR BLD AUTO: 0.4 % (ref 0–1.5)
DEPRECATED RDW RBC AUTO: 40.2 FL (ref 37–54)
EOSINOPHIL # BLD AUTO: 0.08 10*3/MM3 (ref 0–0.4)
EOSINOPHIL NFR BLD AUTO: 0.7 % (ref 0.3–6.2)
ERYTHROCYTE [DISTWIDTH] IN BLOOD BY AUTOMATED COUNT: 13 % (ref 12.3–15.4)
HCT VFR BLD AUTO: 38 % (ref 34–46.6)
HGB BLD-MCNC: 12.2 G/DL (ref 12–15.9)
IMM GRANULOCYTES # BLD AUTO: 0.08 10*3/MM3 (ref 0–0.05)
IMM GRANULOCYTES NFR BLD AUTO: 0.7 % (ref 0–0.5)
LYMPHOCYTES # BLD AUTO: 1.6 10*3/MM3 (ref 0.7–3.1)
LYMPHOCYTES NFR BLD AUTO: 13.1 % (ref 19.6–45.3)
MCH RBC QN AUTO: 27.7 PG (ref 26.6–33)
MCHC RBC AUTO-ENTMCNC: 32.1 G/DL (ref 31.5–35.7)
MCV RBC AUTO: 86.2 FL (ref 79–97)
MONOCYTES # BLD AUTO: 0.7 10*3/MM3 (ref 0.1–0.9)
MONOCYTES NFR BLD AUTO: 5.7 % (ref 5–12)
NEUTROPHILS NFR BLD AUTO: 79.4 % (ref 42.7–76)
NEUTROPHILS NFR BLD AUTO: 9.67 10*3/MM3 (ref 1.7–7)
NRBC BLD AUTO-RTO: 0 /100 WBC (ref 0–0.2)
PLATELET # BLD AUTO: 167 10*3/MM3 (ref 140–450)
PMV BLD AUTO: 11.7 FL (ref 6–12)
RBC # BLD AUTO: 4.41 10*6/MM3 (ref 3.77–5.28)
WBC NRBC COR # BLD: 12.18 10*3/MM3 (ref 3.4–10.8)

## 2023-02-07 PROCEDURE — 0503F POSTPARTUM CARE VISIT: CPT

## 2023-02-07 PROCEDURE — 85025 COMPLETE CBC W/AUTO DIFF WBC: CPT | Performed by: OBSTETRICS & GYNECOLOGY

## 2023-02-07 RX ADMIN — HYDROCODONE BITARTRATE AND ACETAMINOPHEN 1 TABLET: 5; 325 TABLET ORAL at 03:04

## 2023-02-07 RX ADMIN — DOCUSATE SODIUM 100 MG: 100 CAPSULE, LIQUID FILLED ORAL at 20:36

## 2023-02-07 RX ADMIN — Medication: at 09:06

## 2023-02-07 RX ADMIN — ACETAMINOPHEN 325MG 650 MG: 325 TABLET ORAL at 20:36

## 2023-02-07 RX ADMIN — WITCH HAZEL 1 PAD: 500 SOLUTION RECTAL; TOPICAL at 09:06

## 2023-02-07 RX ADMIN — IBUPROFEN 600 MG: 600 TABLET ORAL at 14:42

## 2023-02-07 RX ADMIN — Medication 1 APPLICATION: at 16:52

## 2023-02-07 RX ADMIN — HYDROCODONE BITARTRATE AND ACETAMINOPHEN 1 TABLET: 5; 325 TABLET ORAL at 16:39

## 2023-02-07 RX ADMIN — ACETAMINOPHEN 325MG 650 MG: 325 TABLET ORAL at 05:54

## 2023-02-07 RX ADMIN — IBUPROFEN 600 MG: 600 TABLET ORAL at 20:35

## 2023-02-07 RX ADMIN — DULOXETINE 30 MG: 30 CAPSULE, DELAYED RELEASE ORAL at 20:36

## 2023-02-07 RX ADMIN — DOCUSATE SODIUM 100 MG: 100 CAPSULE, LIQUID FILLED ORAL at 08:44

## 2023-02-07 RX ADMIN — IBUPROFEN 600 MG: 600 TABLET ORAL at 08:44

## 2023-02-07 NOTE — CASE MANAGEMENT/SOCIAL WORK
Continued Stay Note  [facility]     Patient Name: Kiara LaNese Jackson  MRN: [id number]  Today's Date: 2/7/2023    Admit Date: 2/6/2023    Plan:  consult   Discharge Plan     Row Name 02/07/23 1214       Plan    Plan  consult    Plan Comments MSW met with pt. at bedside. MSW discussed reason for visit as pt.’s [place] score was an 11. Pt. reports that she is feeling fine. MSW provided a resource and offered support. MSW provided pt. with [facility] resource. No other needs or concerns at this time. MSW is available.    Final Discharge Disposition Code 30 - still a patient               Discharge Codes    No documentation.                     REGULO Lockwood

## 2023-02-07 NOTE — ANESTHESIA POSTPROCEDURE EVALUATION
Patient: Kiara LaNese Jackson    Procedure Summary     Date: 02/06/23 Room / Location:     Anesthesia Start: 0736 Anesthesia Stop: 1341    Procedure: LABOR ANALGESIA Diagnosis:     Scheduled Providers:  Provider: Channing Bruno MD    Anesthesia Type: epidural ASA Status: 2          Anesthesia Type: epidural    Vitals  Vitals Value Taken Time   /70 02/07/23 0720   Temp 98.1 °F (36.7 °C) 02/07/23 0720   Pulse 84 02/07/23 0720   Resp 18 02/07/23 0720   SpO2             Post Anesthesia Care and Evaluation    Patient location during evaluation: bedside  Patient participation: complete - patient participated  Level of consciousness: awake and alert  Pain management: adequate    Airway patency: patent  Anesthetic complications: No anesthetic complications    Cardiovascular status: acceptable  Respiratory status: acceptable  Hydration status: acceptable  Post Neuraxial Block status: Motor and sensory function returned to baseline and No signs or symptoms of PDPH

## 2023-02-07 NOTE — PROGRESS NOTES
2/8/2023  PPD #1    Subjective   Lexy feels well.  Patient describes her lochia less than menses.  Pain is well controlled       Objective   Temp: Temp:  [98.6 °F (37 °C)-98.7 °F (37.1 °C)] 98.6 °F (37 °C) Temp src: Oral   BP: BP: (114-119)/(59-77) 119/77        Pulse: Heart Rate:  [88-91] 88  RR: Resp:  [16-18] 16    General:  No acute distress   Abdomen: Fundus firm and beneath umbilicus   Pelvis: deferred     Lab Results   Component Value Date    WBC 12.18 (H) 02/07/2023    HGB 12.2 02/07/2023    HCT 38.0 02/07/2023    MCV 86.2 02/07/2023     02/07/2023    HEPBSAG Negative 07/20/2022       Assessment  1. PPD# 1 after vaginal delivery   2. Stable postpartum anemia  3. Male infant, stable, undecided on circumcision.    Plan  1. Routine postpartum care.  2. +THC in pregnancy- to follow up for cord stat.  3. Anxiety-taking Cymbalta daily.      This note has been electronically signed.    Tab Cruz MD  February 8, 2023

## 2023-02-07 NOTE — LACTATION NOTE
02/07/23 1000   Maternal Information   Date of Referral 02/07/23   Person Making Referral lactation consultant   Maternal Reason for Referral no prior breastfeeding experience;breastfeeding currently   Infant Reason for Referral 35-37 weeks gestation;sleepy  (Infant has not nursed well since delivery.  Mom attempting q 3 hours.  Assisted with positioning and latching using a small nipple shield.  With lactation and FOB stimulating, infant maintained latch and nursed well.  6ml EBM given at breast.  SLP consul)   Maternal Assessment   Breast Size Issue none  (requested due to difficult to feed and no audible swallows at breast.  Mom pumping post feeds.)   Breast Shape Bilateral:;round   Breast Density Bilateral:;soft   Nipples Bilateral:;everted   Left Nipple Symptoms intact;nontender   Right Nipple Symptoms intact;nontender   Maternal Infant Feeding   Maternal Emotional State receptive   Infant Positioning cross-cradle;clutch/football  (CC on right for 17 minutes, FB on left for 15 minutes.  Small shield used bilaterally.)   Signs of Milk Transfer other (see comments)  (swallows only heard when 6 ml EBM syringed at shield.)   Pain with Feeding no   Comfort Measures Before/During Feeding infant position adjusted;latch adjusted;maternal position adjusted   Nipple Shape After Feeding, Left Breast round;symmetrical;appropriately projected   Nipple Shape After Feeding, Right round;symmetrical;appropriately projected   Latch Assistance full assistance needed;verbal guidance offered   Support Person Involvement actively supporting mother;verbally supports mother   Milk Expression/Equipment   Breast Pump Type double electric, hospital grade   Breast Pump Flange Type hard   Breast Pump Flange Size 24 mm   Equipment for Home Use breast pump provided  (Spectra pump given from [employer] stock)   Breast Pumping   Breast Pumping Interventions post-feed pumping encouraged;frequent pumping encouraged

## 2023-02-08 VITALS
TEMPERATURE: 98.6 F | SYSTOLIC BLOOD PRESSURE: 119 MMHG | DIASTOLIC BLOOD PRESSURE: 77 MMHG | RESPIRATION RATE: 16 BRPM | HEART RATE: 88 BPM

## 2023-02-08 PROCEDURE — 0503F POSTPARTUM CARE VISIT: CPT

## 2023-02-08 RX ORDER — IBUPROFEN 600 MG/1
600 TABLET ORAL EVERY 6 HOURS PRN
Qty: 30 TABLET | Refills: 0 | Status: SHIPPED | OUTPATIENT
Start: 2023-02-08

## 2023-02-08 RX ADMIN — IBUPROFEN 600 MG: 600 TABLET ORAL at 09:49

## 2023-02-08 RX ADMIN — DOCUSATE SODIUM 100 MG: 100 CAPSULE, LIQUID FILLED ORAL at 09:49

## 2023-02-08 RX ADMIN — HYDROCODONE BITARTRATE AND ACETAMINOPHEN 1 TABLET: 5; 325 TABLET ORAL at 00:24

## 2023-02-08 RX ADMIN — IBUPROFEN 600 MG: 600 TABLET ORAL at 03:41

## 2023-02-08 NOTE — LACTATION NOTE
Mom said baby is breastfeeding well, and her pumped milk volume is up over 10 mL's.  She said her pediatrician said she no longer has to give formula.  She was advised to continue to pump after breastfeeding and provide pumped milk to the baby if he will take it. She was also advised to follow-up in the outpatient lactation clinic, if needed. Baby's current weight loss is 7%.

## 2023-02-08 NOTE — DISCHARGE SUMMARY
Discharge Summary    Date of Admission: 2023  Date of Discharge:  2023      Patient: Kiara LaNese Jackson      MR#:[id number]    Delivery Provider: Tab Cruz     Discharge Surgeon/OB: Ronald    Presenting Problem/History of Present Illness   premature rupture of membranes (PPROM) with unknown onset of labor [O42.919]     Patient Active Problem List   Diagnosis   • History of chlamydia infection   • Dysmenorrhea   • Generalized anxiety disorder   • Vitamin D deficiency   • Hypomagnesemia   • Fibromyalgia   • Allergic rhinitis due to animal hair and dander   • Vulvar abscess   • Supervision of normal first pregnancy, antepartum   • Decreased fetal movement affecting management of pregnancy in third trimester, single or unspecified fetus   • Cramping affecting pregnancy, antepartum   • Leukocytes in urine   • Acute intractable tension-type headache   • Vaginal discharge during pregnancy in third trimester   •  premature rupture of membranes (PPROM) with unknown onset of labor   • 36 weeks gestation of pregnancy   • Spontaneous vaginal delivery         Discharge Diagnosis: Vaginal delivery at 36w1d    Procedures:  Vaginal, Spontaneous     2023    1:41 PM        Discharge Date: 2023;     Hospital Course  Patient is a 22 y.o. female  at 36w1d status post vaginal delivery without complication. Postpartum the patient did well. She remained afebrile, with vital signs stable. Patient reported lochia as mild and pain managed. She was ready for discharge on postpartum day 2.     Infant:   male  fetus 2965 g (6 lb 8.6 oz)  with Apgar scores of 5 , 8  at five minutes.    Condition on Discharge:  Stable    Vital Signs  Temp:  [98.6 °F (37 °C)-98.7 °F (37.1 °C)] 98.6 °F (37 °C)  Heart Rate:  [88-91] 88  Resp:  [16-18] 16  BP: (114-119)/(59-77) 119/77    Lab Results   Component Value Date    WBC 12.18 (H) 2023    HGB 12.2 2023    HCT 38.0 2023    MCV 86.2 2023      02/07/2023       Discharge Disposition  Home or Self Care    Discharge Medications     Discharge Medications      New Medications      Instructions Start Date   ibuprofen 600 MG tablet  Commonly known as: ADVIL,MOTRIN   600 mg, Oral, Every 6 Hours PRN         Continue These Medications      Instructions Start Date   albuterol sulfate  (90 Base) MCG/ACT inhaler  Commonly known as: PROVENTIL HFA;VENTOLIN HFA;PROAIR HFA   2 puffs, Inhalation, Every 4 Hours PRN      DULoxetine 30 MG capsule  Commonly known as: CYMBALTA   30 mg, Oral, Daily      Prenatal Vitamin 27-0.8 MG tablet   1 tablet/day, Oral, Daily         Stop These Medications    amoxicillin-clavulanate 875-125 MG per tablet  Commonly known as: Augmentin     cyclobenzaprine 10 MG tablet  Commonly known as: FLEXERIL     famotidine 20 MG tablet  Commonly known as: Pepcid     ferrous sulfate 325 (65 FE) MG tablet     promethazine 12.5 MG tablet  Commonly known as: PHENERGAN     vitamin B-6 25 MG tablet  Commonly known as: PYRIDOXINE             Activity at Discharge:   Activity Instructions     Pelvic Rest      No Sex, Tampons, Swimming, Tub Baths x 6 weeks          Follow-up Appointments  Future Appointments   Date Time Provider Department Center   2/14/2023  8:50 AM Jett Umanzor MD [facility]   2/21/2023  8:50 AM Jett Umanzor MD [facility]   2/28/2023  8:50 AM Jett Umanzor MD [facility]   3/7/2023  8:50 AM Jett Umanzor MD [facility]     Additional Instructions for the Follow-ups that You Need to Schedule     Call MD With Problems / Concerns   As directed      Discharge Follow-up with Specified Provider: ; 6 Weeks   As directed      To:     Follow Up: 6 Weeks               Tab Cruz MD  02/08/23  13:40 EST  [facility]

## 2023-02-08 NOTE — PLAN OF CARE
Goal Outcome Evaluation:              Outcome Evaluation: VSS, lochia rubra light amt. pain controlled with meds,  breast and bottlefeeds.

## 2023-03-10 ENCOUNTER — HOSPITAL ENCOUNTER (OUTPATIENT)
Dept: LACTATION | Facility: HOSPITAL | Age: 23
Discharge: HOME | End: 2023-03-10

## 2023-03-10 NOTE — LACTATION NOTE
03/10/23 1115   Maternal Information   Date of Referral 03/10/23   Person Making Referral lactation consultant   Maternal Reason for Referral breastfeeding currently;no prior breastfeeding experience   Infant Reason for Referral (S)  tight frenulum   Maternal Assessment   Breast Size Issue none   Breast Density Bilateral:;full   Nipples Bilateral:;everted   Left Nipple Symptoms intact;nontender   Right Nipple Symptoms intact;nontender   Maternal Infant Feeding   Maternal Emotional State receptive;relaxed   Infant Positioning cross-cradle   Signs of Milk Transfer no audible swallow noted;none noted   Pain with Feeding no   Comfort Measures Before/During Feeding infant position adjusted;latch adjusted;maternal position adjusted   Nipple Shape After Feeding, Right round;symmetrical;appropriately projected   Latch Assistance full assistance needed   Support Person Involvement actively supporting mother;verbally supports mother   Feeding Infant   Feeding Readiness Cues eager;energy for feeding;hand to mouth movements;rooting   Satiety Cues other (see comments)  (Lachted infant in CC hold on right breast with great difficulty.  No noted swallowing while nursing even with breast massage and expression.  Infant released breast and was frantic.  Unable to relatch infant with or without shield.)   Feeding Tolerance/Success alert for feeding;eager;reluctant to latch;rooting;suck weak   Effective Latch During Feeding no   Prefeeding Weight (gm) 3580 g (126.3 oz)   Postfeeding Weight (gm) 3580 g (126.3 oz)   Weight Gain/Loss (gm)  0 g (0 oz)   Breastfeeding Session   Breastfeeding breastfeeding, right side only   Breastfeeding Time, Left (min) ~ 3-4 minutes   Infant-Driven Feeding Readiness©   Infant-Driven Feeding Scales - Readiness 1   Infant-Driven Feeding Scales - Quality 4   Infant-Driven Feeding Scales - Caregiver Techniques A;C;F   LATCH Score   Latch 1-->repeated attempts, holds nipple in mouth, stimulate to suck    Audible Swallowing 0-->none   Type of Nipple 2-->everted (after stimulation)   Comfort (Breast/Nipple) 1-->filling, red/small blisters/bruises, mild/mod discomfort   Hold (Positioning) 0-->full assist (staff holds infant at breast)   Latch Score 4   Milk Expression/Equipment   Breast Pump Type double electric, personal  (Obtained 4 ounces using personal S2 after nursing attempt. Also using a hands free pump at home.)   Breast Pump Flange Type hard   Breast Pump Flange Size 24 mm   Breast Pumping   Breast Pumping Interventions frequent pumping encouraged   Breast Pumping bilateral breasts pumped until soft   Lactation Referrals   Lactation Referrals other (see comments)  (Referred to Dr. Christen Zamora, pediatric dentist for consult on tongue and lip tie.)     Patient: Griselda Quan  : 2022  Gestational Age at Birth: 36 & 1  Age: 32 days    REASON FOR VISIT: Unable to latch.  Infant is a slow and difficult feeder.     BREAST MILK SUPPLY:  Mom is pumping exclusively due to infant not latching.  Pumping q 3 hours and obtaining 3-6 ounces (average is usually 4 ounces).    EXAM:  Oral exam by Denise Vidales, SLP, IBCLC showed tight labial frenulum that rolls under during feed.  Class 3 lingual frenulum.  Decreased stripping of bottle.  Body tension.    ASSESSMENT:  Infant was extremely hard to latch.  Pushing and fighting at the breast, interested in latching but unable to.  After 5 minutes of attempting, infant latched without a shield for about 3-4 minutes.  No swallowing audible even with expressing milk into infant's mouth with hand expression and massage.  Infant released breast and was not able to relatch with or without shield.  After trying at breast for 20 minutes, infant removed and bottle fed.      Changed parents from Bakari size 1 bottle to Dr. Sousa's narrow mouth preemie nipple.  Infant leaked out the sides while taking  bottle.  Suck inconsistent and not strong.  Paced bottle feeding demonstrated  and FOB able to demonstrate back.      Parents educated on laser frenulum release procedure.  Stretching demonstrated to parents and both were able to demonstrate back.  Explained importance of stretching and dedication to after procedure care/follow ups/body work.  Parents state they understand and all questions have been answered. After care handouts given and reviewed along with list of local providers of laser release.    PLAN OF CARE:   - Pace bottle feed infant with preemie nipple.  -May try infant at breast but must supplement after with 2.5 ounces.  -Continue to pump q 3 hours.  -Call Dr. Zamora for evaluation.   Infant cleared by lactation for evaluation and treatment.  -Follow up with lactation outpatient clinic scheduled for 3/21.  -Stretch tongue and lip 3 times daily until seen by Dr. Zamora.  -Schedule infant for body work with infant cranial sacral massage therapist or infant chiropractor.  Hand out given on local providers.

## 2023-03-21 ENCOUNTER — HOSPITAL ENCOUNTER (OUTPATIENT)
Dept: LACTATION | Facility: HOSPITAL | Age: 23
Discharge: HOME | End: 2023-03-21

## 2023-03-21 NOTE — LACTATION NOTE
03/21/23 1200   Maternal Information   Person Making Referral patient;other (see comments)  (Baby is post tongue-tie revision (Dr. Zamora).)   Infant Reason for Referral other (see comments)  (post tongue tie revision)   Maternal Assessment   Breast Size Issue none   Breast Shape Bilateral:;round   Breast Density Bilateral:;full   Nipples Bilateral:;everted;short  (Mom is using a nipple shield for latch attempts.  Her nipples are short and baby has been getting primarily bottles.)   Left Nipple Symptoms intact;nontender   Right Nipple Symptoms intact;nontender   Maternal Infant Feeding   Maternal Emotional State anxious;receptive   Infant Positioning cross-cradle;clutch/football   Signs of Milk Transfer deep jaw excursions noted;other (see comments)  (Baby is still uncomfortable at the breast and would only stay engaged (with much encouragement) for a short period with multiple re-latches.  He was able to transfer only 10 mL's before becoming totally resistant.)   Pain with Feeding no   Comfort Measures Before/During Feeding infant position adjusted;maternal position adjusted   Latch Assistance full assistance needed   Feeding Infant   Feeding Readiness Cues eager  (Baby was eager to latch, but lost interest in a short period.)   Feeding Tolerance/Success other (see comments)  (Baby is very inconsistent at the breast and could not get comfortable.)   Feeding Physical Stress Cues other (see comments)  (Baby pushes off breast.)   Effective Latch During Feeding other (see comments)   Suck/Swallow/Breathing Coordination   (Baby sucked well for a short period but began to fight being latched.)   Prefeeding Weight (gm) 3760 g (132.6 oz)   Postfeeding Weight (gm) 3770 g (133 oz)   Weight Gain/Loss (gm)  10 g (0.4 oz)   Breastfeeding Session   Breastfeeding breastfeeding, left side only   Breastfeeding Time, Left (min) on and off latching. perhaps 5 minutes of actual sucking.   LATCH Score   Latch 1-->repeated attempts,  holds nipple in mouth, stimulate to suck   Audible Swallowing 1-->a few with stimulation   Type of Nipple 2-->everted (after stimulation)   Comfort (Breast/Nipple) 2-->soft/nontender   Hold (Positioning) 0-->full assist (staff holds infant at breast)   Latch Score 6   Milk Expression/Equipment   Breast Pump Type double electric, personal   Breast Pumping   Breast Pumping Interventions post-feed pumping encouraged  (Mom encouraged to pump every 3 hours.)     Reason for Visit:  Baby had his lip and tongue revised on March 13.  Mom has been pumping primarily and trying to latch the baby about once per day.  She has returned to the clinic for a follow-up evaluation and assistance with breastfeeding.    Assessment:  SLP evaluated baby's post-revision tongue and lip, and indicated the tongue had begun to minimally re-attach.  She went over tongue exercises with Mom. Baby was still resistant at the breast and would only remain latched for short periods before needing to be released and re-latched.  He still fights being latched and appears uncomfortable at the breast.  He is doing much better with the bottle since the revision.    Plan of Care:  Mom will continue to do lip and tongue exercises per SLP.  She will continue to pump and primarily feed baby from the bottle.  She can attempt breastfeeding several times per day as baby will participate.  SLP strongly suggested Mom try massage or chiropractor to help with baby tightness.  Mom was encouraged to return to the clinic once baby is more cooperative at the breast so that milk transfer can be evaluated.

## 2023-03-22 ENCOUNTER — POSTPARTUM VISIT (OUTPATIENT)
Dept: OBSTETRICS AND GYNECOLOGY | Facility: CLINIC | Age: 23
End: 2023-03-22
Payer: COMMERCIAL

## 2023-03-22 VITALS — WEIGHT: 175.6 LBS | DIASTOLIC BLOOD PRESSURE: 78 MMHG | BODY MASS INDEX: 32.11 KG/M2 | SYSTOLIC BLOOD PRESSURE: 114 MMHG

## 2023-03-22 PROCEDURE — 0503F POSTPARTUM CARE VISIT: CPT | Performed by: OBSTETRICS & GYNECOLOGY

## 2023-03-22 RX ORDER — DROSPIRENONE 4 MG/1
1 TABLET, FILM COATED ORAL DAILY
Qty: 28 TABLET | Refills: 12 | Status: SHIPPED | OUTPATIENT
Start: 2023-03-22

## 2023-03-22 NOTE — PROGRESS NOTES
Chief Complaint   Patient presents with   • Routine Prenatal Visit       6 Week Postpartum Visit         Kiara LaNese Jackson is a 23 y.o.  who presents today for a 6 week postpartum check.     C/S: no     Vaginal, Spontaneous    Information for the patient's :  Griselda Quan [[id number]]   2022   male   Griselda Quan   2965 g (6 lb 8.6 oz)   Gestational Age: 36w1d           Baby Discharged: Discharged with Mom  Delivering Physician: Tab Cruz MD    At the time of delivery were you diagnosed with any of the following: None. Pt did have a laceration and is healing well. Patient describes vaginal bleeding as light. Patient is breast feeding.  She desires contraceptive methods: patch for contraception.  Patient denies bowel or bladder issues.      Patient denies postpartum depression. Postpartum Depression Screening Questionnaire: 7, no treatment indicated.      Last Pap : 2022. Results: negative. HPV: negative.   Last Completed Pap Smear          PAP SMEAR (Every 3 Years) Next due on 2022  LIQUID-BASED PAP SMEAR, [facility] ([facility])                  The additional following portions of the patient's history were reviewed and updated as appropriate: allergies and current medications.    Review of Systems   Constitutional: Negative.    HENT: Negative.    Eyes: Negative.    Respiratory: Negative.    Cardiovascular: Negative.    Gastrointestinal: Negative.    Endocrine: Negative.    Genitourinary: Negative.    Musculoskeletal: Negative.    Skin: Negative.    Allergic/Immunologic: Negative.    Neurological: Negative.    Hematological: Negative.    Psychiatric/Behavioral: Negative.      All other systems reviewed and are negative.     I have reviewed and agree with the HPI, ROS, and historical information as entered above. Jett Umanzor MD    /78   Wt 79.7 kg (175 lb 9.6 oz)   LMP 2022   Breastfeeding Yes   BMI 32.11  kg/m²     Physical Exam  Vitals reviewed. Exam conducted with a chaperone present.   Constitutional:       Appearance: Normal appearance. She is normal weight.   HENT:      Head: Normocephalic and atraumatic.   Abdominal:      General: Abdomen is flat. Bowel sounds are normal.      Palpations: Abdomen is soft.   Genitourinary:     General: Normal vulva.      Vagina: Normal.      Cervix: Normal.      Uterus: Normal.       Adnexa: Right adnexa normal and left adnexa normal.   Skin:     General: Skin is warm and dry.   Neurological:      Mental Status: She is alert and oriented to person, place, and time.   Psychiatric:         Mood and Affect: Mood normal.         Behavior: Behavior normal.             Assessment and Plan    Problem List Items Addressed This Visit        Gravid and     Spontaneous vaginal delivery - Primary   Other Visit Diagnoses     Postpartum care following vaginal delivery              1. S/p Vaginal delivery, 6 weeks postpartum.  Doing well.    2. Return to normal physical activity.  No pelvic restrictions.   3. Baby doing well.  4. Breastfeeding going well.  5. No si/sx of postpartum depression  6. Contraception: contraceptive methods: Oral progesterone-only contraceptive  7. Return in about 1 year (around 3/22/2024) for Annual physical.      Jett Umanzor MD  2023

## 2023-08-22 ENCOUNTER — OFFICE VISIT (OUTPATIENT)
Dept: INTERNAL MEDICINE | Facility: CLINIC | Age: 23
End: 2023-08-22
Payer: COMMERCIAL

## 2023-08-22 VITALS
SYSTOLIC BLOOD PRESSURE: 106 MMHG | TEMPERATURE: 97.5 F | DIASTOLIC BLOOD PRESSURE: 70 MMHG | HEART RATE: 76 BPM | WEIGHT: 186.6 LBS | BODY MASS INDEX: 34.12 KG/M2

## 2023-08-22 DIAGNOSIS — M79.7 FIBROMYALGIA: Primary | Chronic | ICD-10-CM

## 2023-08-22 DIAGNOSIS — Z13.21 ENCOUNTER FOR VITAMIN DEFICIENCY SCREENING: ICD-10-CM

## 2023-08-22 DIAGNOSIS — R53.83 FATIGUE, UNSPECIFIED TYPE: ICD-10-CM

## 2023-08-22 DIAGNOSIS — Z13.220 LIPID SCREENING: ICD-10-CM

## 2023-08-22 DIAGNOSIS — F41.1 GENERALIZED ANXIETY DISORDER: Chronic | ICD-10-CM

## 2023-08-22 PROBLEM — O26.899 CRAMPING AFFECTING PREGNANCY, ANTEPARTUM: Status: RESOLVED | Noted: 2023-01-31 | Resolved: 2023-08-22

## 2023-08-22 PROBLEM — R10.9 CRAMPING AFFECTING PREGNANCY, ANTEPARTUM: Status: RESOLVED | Noted: 2023-01-31 | Resolved: 2023-08-22

## 2023-08-22 PROBLEM — Z3A.36 36 WEEKS GESTATION OF PREGNANCY: Status: RESOLVED | Noted: 2023-02-06 | Resolved: 2023-08-22

## 2023-08-22 PROBLEM — O42.919 PRETERM PREMATURE RUPTURE OF MEMBRANES (PPROM) WITH UNKNOWN ONSET OF LABOR: Status: RESOLVED | Noted: 2023-02-06 | Resolved: 2023-08-22

## 2023-08-22 PROBLEM — Z34.00 SUPERVISION OF NORMAL FIRST PREGNANCY, ANTEPARTUM: Status: RESOLVED | Noted: 2022-11-29 | Resolved: 2023-08-22

## 2023-08-22 PROCEDURE — 99214 OFFICE O/P EST MOD 30 MIN: CPT | Performed by: STUDENT IN AN ORGANIZED HEALTH CARE EDUCATION/TRAINING PROGRAM

## 2023-08-22 RX ORDER — DULOXETIN HYDROCHLORIDE 30 MG/1
30 CAPSULE, DELAYED RELEASE ORAL DAILY
Qty: 30 CAPSULE | Refills: 1 | Status: SHIPPED | OUTPATIENT
Start: 2023-08-22

## 2023-08-22 RX ORDER — IBUPROFEN 600 MG/1
600 TABLET ORAL EVERY 6 HOURS PRN
Qty: 120 TABLET | Refills: 3 | Status: SHIPPED | OUTPATIENT
Start: 2023-08-22

## 2023-08-22 RX ORDER — PNV NO.95/FERROUS FUM/FOLIC AC 28MG-0.8MG
1 TABLET ORAL DAILY
Qty: 30 TABLET | Refills: 11 | Status: SHIPPED | OUTPATIENT
Start: 2023-08-22

## 2023-08-22 NOTE — PROGRESS NOTES
"Chief Complaint  Kiara LaNese Jackson is a 23 y.o. female presenting for Fibromyalgia.     From [place]. Lives with edward‚, had their first son born 2/2023. Works at full time at / \"[employer]\".     Patient has a past medical history of fibromyalgia, ovarian cyst, menorrhagia, vitamin D deficiency, headaches, depression and anxiety.    History of Present Illness  Patient is here for follow-up.  Since I last saw her she has gotten engaged, and they had their first child in February.  She just stopped breast-feeding a couple of weeks ago.  She has not started back on birth control, but says she is in touch with her OB and could reach out if she wanted to restart OCP.  Patient is having regular periods.  She reports having more bleeding while on birth control.  She is currently sexually active with her fianc‚.  She feels her mood is fairly good, but does have some anxiety.    She was on Cymbalta before for her fibromyalgia and anxiety, which was stopped during her pregnancy.  She is not sure if it helped her so much, but she is interested in restarting it.  She is reporting pain of her legs and also back, similar as in the past.    The following portions of the patient's history were reviewed and updated as appropriate: allergies, current medications, past family history, past medical history, past social history, past surgical history, and problem list.    Objective  /70 (BP Location: Left arm, Patient Position: Sitting, Cuff Size: Large Adult)   Pulse 76   Temp 97.5 øF (36.4 øC) (Temporal)   Wt 84.6 kg (186 lb 9.6 oz)   Breastfeeding No   BMI 34.12 kg/mý     Physical Exam  Constitutional:       Appearance: Normal appearance.   HENT:      Head: Normocephalic and atraumatic.   Eyes:      Extraocular Movements: Extraocular movements intact.      Conjunctiva/sclera: Conjunctivae normal.   Pulmonary:      Effort: Pulmonary effort is normal. No respiratory distress.   Musculoskeletal:      Cervical " back: Neck supple.   Neurological:      Mental Status: She is alert and oriented to person, place, and time. Mental status is at baseline.   Psychiatric:         Behavior: Behavior normal.         Thought Content: Thought content normal.       Assessment/Plan   1. Fibromyalgia  Overall stable, would like to restart duloxetine.  She can also use ibuprofen for symptom relief.  Also counseled on light exercise which she is known to be helpful in patients with fibromyalgia.  We will do follow-up in 5 weeks.  I recommend she uses birth control of some form, and she wants to reach out to her OB/GYN for initiation.    Her sed rate was elevated in the past, also her CRP.  We will do a repeat of blood work today.    - DULoxetine (CYMBALTA) 30 MG capsule; Take 1 capsule by mouth Daily.  Dispense: 30 capsule; Refill: 1  - ibuprofen (ADVIL,MOTRIN) 600 MG tablet; Take 1 tablet by mouth Every 6 (Six) Hours As Needed for Mild Pain.  Dispense: 120 tablet; Refill: 3  - Sedimentation Rate; Future  - C-reactive Protein; Future    2. Generalized anxiety disorder  Overall fairly well controlled.  Duloxetine may also be helpful.  Will check TSH.  - DULoxetine (CYMBALTA) 30 MG capsule; Take 1 capsule by mouth Daily.  Dispense: 30 capsule; Refill: 1  - TSH Rfx On Abnormal To Free T4; Future    3. Encounter for vitamin deficiency screening  - Vitamin B12; Future  - Methylmalonic Acid, Serum; Future    4. Fatigue, unspecified type  - Comprehensive Metabolic Panel; Future  - CBC & Differential; Future    5. Lipid screening  Patient will return for fasting lipids and blood work  - Lipid Panel; Future      Return in about 5 weeks (around 9/26/2023) for Annual physical.    Future Appointments         Provider Department Center    9/26/2023 9:45 AM Ahsan Pedro MD [facility] MEDIC[facility] [place]            Ahsan Pedro MD  Family Medicine  08/22/2023

## 2023-10-16 ENCOUNTER — TELEPHONE (OUTPATIENT)
Dept: OBSTETRICS AND GYNECOLOGY | Facility: CLINIC | Age: 23
End: 2023-10-16
Payer: COMMERCIAL

## 2023-10-16 ENCOUNTER — OFFICE VISIT (OUTPATIENT)
Dept: OBSTETRICS AND GYNECOLOGY | Facility: CLINIC | Age: 23
End: 2023-10-16
Payer: COMMERCIAL

## 2023-10-16 VITALS
HEIGHT: 62 IN | BODY MASS INDEX: 33.49 KG/M2 | SYSTOLIC BLOOD PRESSURE: 102 MMHG | WEIGHT: 182 LBS | DIASTOLIC BLOOD PRESSURE: 78 MMHG

## 2023-10-16 DIAGNOSIS — N75.0 BARTHOLIN GLAND CYST: Primary | ICD-10-CM

## 2023-10-16 PROCEDURE — 99213 OFFICE O/P EST LOW 20 MIN: CPT | Performed by: OBSTETRICS & GYNECOLOGY

## 2023-10-16 RX ORDER — SULFAMETHOXAZOLE AND TRIMETHOPRIM 800; 160 MG/1; MG/1
1 TABLET ORAL 2 TIMES DAILY
Qty: 14 TABLET | Refills: 0 | Status: SHIPPED | OUTPATIENT
Start: 2023-10-16 | End: 2023-10-23

## 2023-10-16 NOTE — TELEPHONE ENCOUNTER
Returned patient's call. States she has another Bartholin's cyst and would like to see Dr. Umanzor today if possible. Appointment given.

## 2023-10-16 NOTE — PROGRESS NOTES
Chief Complaint   Patient presents with    Bartholin's Cyst       Subjective   HPI  Kiara LaNese Jackson is a 23 y.o. female, . Her last LMP was No LMP recorded (lmp unknown).. who presents for follow up on bartholins cyst .      At her last visit she was treated with  nothing . Since then she reports her symptoms/issue has improved. Bartholin busted yesterday The patient reports additional symptoms as none.      She states that the bartholin gland cyst burst and started draining yesterday.     Additional OB/GYN History     Last Pap :   Last Completed Pap Smear            PAP SMEAR (Every 3 Years) Next due on 2022  LIQUID-BASED PAP SMEAR, [facility] ([facility])                    Last mammogram: na  Last Completed Mammogram       This patient has no relevant Health Maintenance data.            Tobacco Usage?: No   OB History          1    Para   1    Term   0       1    AB   0    Living   1         SAB   0    IAB   0    Ectopic   0    Molar   0    Multiple   0    Live Births   1                  Current Outpatient Medications:     albuterol sulfate  (90 Base) MCG/ACT inhaler, Inhale 2 puffs Every 4 (Four) Hours As Needed., Disp: , Rfl:     DULoxetine (CYMBALTA) 30 MG capsule, Take 1 capsule by mouth Daily., Disp: 30 capsule, Rfl: 1    ibuprofen (ADVIL,MOTRIN) 600 MG tablet, Take 1 tablet by mouth Every 6 (Six) Hours As Needed for Mild Pain., Disp: 120 tablet, Rfl: 3    Prenatal Vit-Fe Fumarate-FA (Prenatal Vitamin) 27-0.8 MG tablet, Take 1 tablet/day by mouth Daily., Disp: 30 tablet, Rfl: 11    sulfamethoxazole-trimethoprim (Bactrim DS) 800-160 MG per tablet, Take 1 tablet by mouth 2 (Two) Times a Day for 7 days., Disp: 14 tablet, Rfl: 0     Past Medical History:   Diagnosis Date    Anxiety     Depression     Fibromyalgia     Generalized anxiety disorder 2021    With panic attacks    Headache     Irregular menses     Menorrhagia     Ovarian  "cyst     Vitamin D deficiency 08/30/2021 6/2021: 25 OH Vit D 10.2.        Past Surgical History:   Procedure Laterality Date    COLONOSCOPY      DENTAL PROCEDURE      ENDOSCOPY      NO PAST SURGERIES         The additional following portions of the patient's history were reviewed and updated as appropriate: allergies and current medications.    Review of Systems   Constitutional: Negative.    Respiratory: Negative.     Cardiovascular: Negative.    Gastrointestinal: Negative.    Genitourinary:  Positive for vaginal discharge and vaginal pain. Negative for menstrual problem and pelvic pain.   Neurological: Negative.    Hematological: Negative.    Psychiatric/Behavioral: Negative.         I have reviewed and agree with the HPI, ROS, and historical information as entered above. Jett Umanzro MD      Objective   /78   Ht 157.5 cm (62.01\")   Wt 82.6 kg (182 lb)   LMP  (LMP Unknown)   BMI 33.28 kg/m²     Physical Exam  Vitals reviewed. Exam conducted with a chaperone present.   Constitutional:       Appearance: Normal appearance. She is normal weight.   HENT:      Head: Normocephalic and atraumatic.   Genitourinary:     General: Normal vulva.          Comments: Drainage site noted. No currect drainage or swelling consistent with ruptured bartholin gland cyst  Skin:     General: Skin is warm and dry.   Neurological:      Mental Status: She is alert and oriented to person, place, and time.   Psychiatric:         Mood and Affect: Mood normal.         Behavior: Behavior normal.         Assessment & Plan     Assessment     Problem List Items Addressed This Visit       Bartholin gland cyst - Primary       Bartholin gland cyst.    Plan     Currently drained. No need to drain at this point. Will start on Abx therapy.   Return if symptoms worsen or fail to improve.        Jett Umanzor MD  10/16/2023   "

## 2023-10-16 NOTE — TELEPHONE ENCOUNTER
Provider: SR. DRAKE    Caller: KIARA LANESE JACKSON    Relationship to Patient: SELF    Pharmacy:     Phone Number: [phone]    Reason for Call: BARTHOLIN'S CYST    When was the patient last seen: 03.22.23    PATIENT CALLING IN BECAUSE SHE HAS A BARTHOLIN'S CYST. PATIENT IS REQUESTING AN APPOINTMENT FOR TODAY (10.16.23).  PATIENT CAN BE REACHED .[phone]    THANK YOU

## 2024-02-19 ENCOUNTER — OFFICE VISIT (OUTPATIENT)
Dept: INTERNAL MEDICINE | Facility: CLINIC | Age: 24
End: 2024-02-19
Payer: COMMERCIAL

## 2024-02-19 VITALS
BODY MASS INDEX: 30.62 KG/M2 | HEART RATE: 60 BPM | TEMPERATURE: 98.2 F | WEIGHT: 166.4 LBS | HEIGHT: 62 IN | SYSTOLIC BLOOD PRESSURE: 100 MMHG | DIASTOLIC BLOOD PRESSURE: 70 MMHG

## 2024-02-19 DIAGNOSIS — J98.8 VIRAL RESPIRATORY INFECTION: Primary | ICD-10-CM

## 2024-02-19 DIAGNOSIS — M79.7 FIBROMYALGIA: Chronic | ICD-10-CM

## 2024-02-19 DIAGNOSIS — J02.9 SORE THROAT: ICD-10-CM

## 2024-02-19 DIAGNOSIS — B97.89 VIRAL RESPIRATORY INFECTION: Primary | ICD-10-CM

## 2024-02-19 DIAGNOSIS — F41.1 GENERALIZED ANXIETY DISORDER: Chronic | ICD-10-CM

## 2024-02-19 DIAGNOSIS — K59.00 CONSTIPATION, UNSPECIFIED CONSTIPATION TYPE: ICD-10-CM

## 2024-02-19 LAB
EXPIRATION DATE: NORMAL
EXPIRATION DATE: NORMAL
FLUAV AG UPPER RESP QL IA.RAPID: NOT DETECTED
FLUBV AG UPPER RESP QL IA.RAPID: NOT DETECTED
INTERNAL CONTROL: NORMAL
INTERNAL CONTROL: NORMAL
Lab: NORMAL
Lab: NORMAL
S PYO AG THROAT QL: NEGATIVE
SARS-COV-2 AG UPPER RESP QL IA.RAPID: NOT DETECTED

## 2024-02-19 PROCEDURE — 99214 OFFICE O/P EST MOD 30 MIN: CPT | Performed by: STUDENT IN AN ORGANIZED HEALTH CARE EDUCATION/TRAINING PROGRAM

## 2024-02-19 PROCEDURE — 87428 SARSCOV & INF VIR A&B AG IA: CPT | Performed by: STUDENT IN AN ORGANIZED HEALTH CARE EDUCATION/TRAINING PROGRAM

## 2024-02-19 PROCEDURE — 87880 STREP A ASSAY W/OPTIC: CPT | Performed by: STUDENT IN AN ORGANIZED HEALTH CARE EDUCATION/TRAINING PROGRAM

## 2024-02-19 RX ORDER — IBUPROFEN 600 MG/1
600 TABLET ORAL EVERY 6 HOURS PRN
Qty: 120 TABLET | Refills: 3 | Status: SHIPPED | OUTPATIENT
Start: 2024-02-19

## 2024-02-19 RX ORDER — DULOXETIN HYDROCHLORIDE 30 MG/1
30 CAPSULE, DELAYED RELEASE ORAL DAILY
Qty: 30 CAPSULE | Refills: 1 | Status: SHIPPED | OUTPATIENT
Start: 2024-02-19 | End: 2024-02-19 | Stop reason: SDUPTHER

## 2024-02-19 RX ORDER — DULOXETIN HYDROCHLORIDE 30 MG/1
30 CAPSULE, DELAYED RELEASE ORAL DAILY
Qty: 30 CAPSULE | Refills: 5 | Status: SHIPPED | OUTPATIENT
Start: 2024-02-19

## 2024-02-19 NOTE — LETTER
February 19, 2024     Patient: Kiara LaNese Jackson   YOB: 2000   Date of Visit: 2/19/2024       To Whom It May Concern:      Lexy Ortiz was seen in my office today for an acute illness.  Please excuse her absence for 02/19/24 and 02/20/24, she can return to work 02/21/24         Sincerely,        Ahsan Pedro MD    CC: No Recipients

## 2024-02-19 NOTE — PROGRESS NOTES
"Chief Complaint  Kiara LaNese Jackson is a 24 y.o. female presenting for URI (Ear pain  X's 3 days ).     From [place]. Lives with mabel, had their first son born 2/2023. Works at full time at / \"[employer]\".     Patient has a past medical history of fibromyalgia, ovarian cyst, menorrhagia, vitamin D deficiency, headaches, depression and anxiety.    History of Present Illness  Patient is here for acute visit.  She is also requesting refill on her medications.    Patient tells me symptoms started Friday, 2/16/2024 with cough, Saturday runny nose, nasal congestion, sore throat and clogging of her ears.  Her fiancé also has mild symptoms.  No change to taste or smell.  Appetite is good.  No fever or chills, but reports some body aches.  No home testing for COVID.    Over the last 3 weeks she also reports some abdominal discomfort, some constipation, hard stools, no diarrhea.    The following portions of the patient's history were reviewed and updated as appropriate: allergies, current medications, past family history, past medical history, past social history, past surgical history, and problem list.    Objective  /70 (BP Location: Left arm, Patient Position: Sitting, Cuff Size: Adult)   Pulse 60   Temp 98.2 °F (36.8 °C) (Temporal)   Ht 157.5 cm (62.01\")   Wt 75.5 kg (166 lb 6.4 oz)   BMI 30.43 kg/m²     Physical Exam  Vitals reviewed.   Constitutional:       Appearance: Normal appearance.   HENT:      Head: Normocephalic and atraumatic.      Right Ear: Tympanic membrane, ear canal and external ear normal. There is no impacted cerumen.      Left Ear: Tympanic membrane, ear canal and external ear normal. There is no impacted cerumen.      Nose: Congestion present.      Mouth/Throat:      Mouth: Mucous membranes are moist.      Pharynx: No oropharyngeal exudate or posterior oropharyngeal erythema.   Eyes:      Extraocular Movements: Extraocular movements intact.      Conjunctiva/sclera: " Conjunctivae normal.   Cardiovascular:      Rate and Rhythm: Normal rate and regular rhythm.      Heart sounds: Normal heart sounds. No murmur heard.  Pulmonary:      Effort: Pulmonary effort is normal. No respiratory distress.      Breath sounds: Normal breath sounds. No wheezing.   Musculoskeletal:      Cervical back: Neck supple. No tenderness.   Lymphadenopathy:      Cervical: No cervical adenopathy.   Skin:     General: Skin is warm and dry.   Neurological:      Mental Status: She is alert and oriented to person, place, and time. Mental status is at baseline.   Psychiatric:         Behavior: Behavior normal.         Thought Content: Thought content normal.         Assessment/Plan   1. Viral respiratory infection  2. Sore throat  Strep test, COVID and flu negative.  Likely viral infection.  Recommend continuing to monitor symptoms.  With any worsening symptoms she should get back in touch.  - POCT SARS-CoV-2 + Flu Antigen CHANA  - POC Rapid Strep A    3. Fibromyalgia  Stable.  Continue use of ibuprofen.  - ibuprofen (ADVIL,MOTRIN) 600 MG tablet; Take 1 tablet by mouth Every 6 (Six) Hours As Needed for Mild Pain.  Dispense: 120 tablet; Refill: 3  - DULoxetine (CYMBALTA) 30 MG capsule; Take 1 capsule by mouth Daily.  Dispense: 30 capsule; Refill: 5    4. Generalized anxiety disorder  Stable.  Continue on duloxetine.  - DULoxetine (CYMBALTA) 30 MG capsule; Take 1 capsule by mouth Daily.  Dispense: 30 capsule; Refill: 5    5. Constipation, unspecified constipation type  Recommend trial of MiraLAX daily for 1-2 weeks.  Can also try prune juice or prunes      Return in about 13 weeks (around 5/20/2024), or if symptoms worsen or fail to improve, for Annual physical.    Future Appointments         Provider Department Center    5/22/2024 1:00 PM Ahsan Pedro MD [facility] INTERNAL MEDICINE [place]              Ahsan Pedro MD  Family Medicine  02/19/2024

## 2025-01-25 ENCOUNTER — HOSPITAL ENCOUNTER (EMERGENCY)
Facility: HOSPITAL | Age: 25
Discharge: HOME | End: 2025-01-25
Attending: EMERGENCY MEDICINE
Payer: COMMERCIAL

## 2025-01-25 ENCOUNTER — APPOINTMENT (OUTPATIENT)
Dept: GENERAL RADIOLOGY | Facility: HOSPITAL | Age: 25
End: 2025-01-25
Payer: COMMERCIAL

## 2025-01-25 VITALS
OXYGEN SATURATION: 97 % | DIASTOLIC BLOOD PRESSURE: 77 MMHG | TEMPERATURE: 98.4 F | HEART RATE: 98 BPM | RESPIRATION RATE: 18 BRPM | BODY MASS INDEX: 28.34 KG/M2 | SYSTOLIC BLOOD PRESSURE: 115 MMHG | WEIGHT: 154 LBS | HEIGHT: 62 IN

## 2025-01-25 DIAGNOSIS — J10.1 INFLUENZA A: Primary | ICD-10-CM

## 2025-01-25 LAB
B PARAPERT DNA SPEC QL NAA+PROBE: NOT DETECTED
B PERT DNA SPEC QL NAA+PROBE: NOT DETECTED
C PNEUM DNA NPH QL NAA+NON-PROBE: NOT DETECTED
FLUAV H3 RNA NPH QL NAA+PROBE: DETECTED
FLUBV RNA ISLT QL NAA+PROBE: NOT DETECTED
HADV DNA SPEC NAA+PROBE: NOT DETECTED
HCOV 229E RNA SPEC QL NAA+PROBE: NOT DETECTED
HCOV HKU1 RNA SPEC QL NAA+PROBE: NOT DETECTED
HCOV NL63 RNA SPEC QL NAA+PROBE: NOT DETECTED
HCOV OC43 RNA SPEC QL NAA+PROBE: NOT DETECTED
HMPV RNA NPH QL NAA+NON-PROBE: NOT DETECTED
HPIV1 RNA ISLT QL NAA+PROBE: NOT DETECTED
HPIV2 RNA SPEC QL NAA+PROBE: NOT DETECTED
HPIV3 RNA NPH QL NAA+PROBE: NOT DETECTED
HPIV4 P GENE NPH QL NAA+PROBE: NOT DETECTED
M PNEUMO IGG SER IA-ACNC: NOT DETECTED
QT INTERVAL: 358 MS
QTC INTERVAL: 449 MS
RHINOVIRUS RNA SPEC NAA+PROBE: NOT DETECTED
RSV RNA NPH QL NAA+NON-PROBE: NOT DETECTED
SARS-COV-2 RNA NPH QL NAA+NON-PROBE: NOT DETECTED

## 2025-01-25 PROCEDURE — 71046 X-RAY EXAM CHEST 2 VIEWS: CPT

## 2025-01-25 PROCEDURE — 99284 EMERGENCY DEPT VISIT MOD MDM: CPT

## 2025-01-25 PROCEDURE — 96372 THER/PROPH/DIAG INJ SC/IM: CPT

## 2025-01-25 PROCEDURE — 0202U NFCT DS 22 TRGT SARS-COV-2: CPT | Performed by: NURSE PRACTITIONER

## 2025-01-25 PROCEDURE — 25010000002 KETOROLAC TROMETHAMINE PER 15 MG: Performed by: NURSE PRACTITIONER

## 2025-01-25 PROCEDURE — 94640 AIRWAY INHALATION TREATMENT: CPT

## 2025-01-25 PROCEDURE — 93005 ELECTROCARDIOGRAM TRACING: CPT | Performed by: EMERGENCY MEDICINE

## 2025-01-25 PROCEDURE — 94664 DEMO&/EVAL PT USE INHALER: CPT

## 2025-01-25 RX ORDER — DEXTROMETHORPHAN HYDROBROMIDE AND PROMETHAZINE HYDROCHLORIDE 15; 6.25 MG/5ML; MG/5ML
5 SYRUP ORAL 4 TIMES DAILY PRN
Qty: 140 ML | Refills: 0 | Status: SHIPPED | OUTPATIENT
Start: 2025-01-25 | End: 2025-01-30

## 2025-01-25 RX ORDER — ALBUTEROL SULFATE 90 UG/1
2 INHALANT RESPIRATORY (INHALATION) ONCE
Status: COMPLETED | OUTPATIENT
Start: 2025-01-25 | End: 2025-01-25

## 2025-01-25 RX ORDER — METHYLPREDNISOLONE 4 MG/1
TABLET ORAL
Qty: 21 TABLET | Refills: 0 | Status: SHIPPED | OUTPATIENT
Start: 2025-01-25

## 2025-01-25 RX ORDER — DEXTROMETHORPHAN HYDROBROMIDE AND PROMETHAZINE HYDROCHLORIDE 15; 6.25 MG/5ML; MG/5ML
5 SYRUP ORAL 4 TIMES DAILY PRN
Qty: 140 ML | Refills: 0 | Status: SHIPPED | OUTPATIENT
Start: 2025-01-25 | End: 2025-01-25

## 2025-01-25 RX ORDER — METHYLPREDNISOLONE 4 MG/1
TABLET ORAL
Qty: 21 TABLET | Refills: 0 | Status: SHIPPED | OUTPATIENT
Start: 2025-01-25 | End: 2025-01-25

## 2025-01-25 RX ORDER — KETOROLAC TROMETHAMINE 30 MG/ML
30 INJECTION, SOLUTION INTRAMUSCULAR; INTRAVENOUS ONCE
Status: COMPLETED | OUTPATIENT
Start: 2025-01-25 | End: 2025-01-25

## 2025-01-25 RX ADMIN — ALBUTEROL SULFATE 2 PUFF: 90 AEROSOL, METERED RESPIRATORY (INHALATION) at 16:34

## 2025-01-25 RX ADMIN — KETOROLAC TROMETHAMINE 30 MG: 30 INJECTION, SOLUTION INTRAMUSCULAR; INTRAVENOUS at 15:25

## 2025-01-25 NOTE — DISCHARGE INSTRUCTIONS
Use inhaler.      Take meds as ordered.     GO home rest, increase fluids.      Tylenol and ibuprofen for fever and body aches.

## 2025-01-25 NOTE — ED PROVIDER NOTES
EMERGENCY DEPARTMENT ENCOUNTER    Pt Name: Kiara LaNese Jackson  MRN: [id number]  Pt :   2000  Room Number:  RW1/R1  Date of encounter:  2025  PCP: Ahsan Pedro MD  ED Provider: LIZANDRO Knott    Historian: Patient    HPI:  Chief Complaint:    Context: Kiara LaNese Jackson is a 24 y.o. female who presents to the ED c/o ***  HPI     REVIEW OF SYSTEMS  A chief complaint appropriate review of systems was completed and is negative except as noted in the HPI.     PAST MEDICAL HISTORY  Past Medical History:   Diagnosis Date    Anxiety     Depression     Fibromyalgia     Generalized anxiety disorder 2021    With panic attacks    Headache     Irregular menses     Menorrhagia     Ovarian cyst     Vitamin D deficiency 2021: 25 OH Vit D 10.2.       PAST SURGICAL HISTORY  Past Surgical History:   Procedure Laterality Date    COLONOSCOPY      DENTAL PROCEDURE      ENDOSCOPY      NO PAST SURGERIES         FAMILY HISTORY  Family History   Problem Relation Age of Onset    Cancer Father         unknown type    Cancer Maternal Grandmother         unknown type    Hypertension Other        SOCIAL HISTORY  Social History     Socioeconomic History    Marital status: Single   Tobacco Use    Smoking status: Never    Smokeless tobacco: Never   Vaping Use    Vaping status: Never Used   Substance and Sexual Activity    Alcohol use: Never     Comment: Socially, less than once a months     Drug use: Never     Types: Marijuana    Sexual activity: Yes     Partners: Male     Birth control/protection: None       ALLERGIES  Patient has no known allergies.    PHYSICAL EXAM  Physical Exam  ***    LAB RESULTS  Results for orders placed or performed during the hospital encounter of 25   ECG 12 Lead Dyspnea    Collection Time: 25  2:21 PM   Result Value Ref Range    QT Interval 358 ms    QTC Interval 449 ms       If labs were ordered, I independently reviewed the results and considered them in  treating the patient.    RADIOLOGY  XR Chest 2 View   Final Result   Impression:   No radiographic evidence of acute cardiopulmonary process.            Electronically Signed: Elliott Tomlinson MD     1/25/2025 3:06 PM EST     Workstation ID: [id number]        [] Radiologist's Report Reviewed:  I ordered and independently interpreted the above noted radiographic studies.  See radiologist's dictation for official interpretation.      PROCEDURES    Procedures    ECG 12 Lead Dyspnea   Preliminary Result   Test Reason : Dyspnea   Blood Pressure :   */*   mmHG   Vent. Rate :  95 BPM     Atrial Rate :  95 BPM      P-R Int : 144 ms          QRS Dur :  78 ms       QT Int : 358 ms       P-R-T Axes :  76  37  37 degrees     QTcB Int : 449 ms      Normal sinus rhythm   Nonspecific T wave abnormality   Abnormal ECG   When compared with ECG of 03-Feb-2020 12:09,   Nonspecific T wave abnormality now evident in Lateral leads      Referred By: EDMD           Confirmed By:           MEDICATIONS GIVEN IN ER    Medications   ketorolac (TORADOL) injection 30 mg (30 mg Intramuscular Given 1/25/25 1525)       MEDICAL DECISION MAKING, PROGRESS, and CONSULTS   Medical Decision Making      Discussion below represents my analysis of pertinent findings related to patient's condition, differential diagnosis, treatment plan and final disposition.    Differential diagnosis:   Additional differential diagnosis include but are not limited to:     Additional sources  Discussed/ obtained information from independent historians:   [] Spouse  [] Parent  [] Family member  [] Friend  [] EMS   [] Other:  External (non-ED) record review:   [] Inpatient record:   [] Office record:   [] Outpatient record:   [] Prior Outpatient labs:   [] Prior Outpatient radiology:   [] Primary Care record:   [] Outside ED record:   [] Other:   Patient's care impacted by:   [] Diabetes  [] Hypertension  [] Hyperlipidemia  [] Hypothyroidism   [] Coronary Artery Disease  []  Congestive Heart Failure   [] COPD   [] Cancer   [] Obesity  [] GERD   [] Tobacco Abuse   [] Substance Abuse    [] Anxiety   [] Depression   [] Other:   Care significantly affected by Social Determinants of Health (housing and economic circumstances, unemployment)    [] Yes     [] No   If yes, Patient's care significantly limited by  Social Determinants of Health including:   [] Inadequate housing   [] Low income   [] Alcoholism and drug addiction in family   [] Problems related to primary support group   [] Unemployment   [] Problems related to employment   [] Other Social Determinants of Health:   Shared decision making:    Orders placed during this visit:  Orders Placed This Encounter   Procedures    COVID PRE-OP / PRE-PROCEDURE SCREENING ORDER (NO ISOLATION) - Swab, Nasopharynx    Respiratory Panel PCR w/COVID-19(SARS-CoV-2) T[facility]WON/KELSEY/PAD/[facility]/PAULO In-House, NP Swab in UTM/VTM, 2 HR TAT - Swab, Nasopharynx    XR Chest 2 View    ECG 12 Lead Dyspnea       I considered prescription management  with:   [] Pain medication  [] Antiviral  [] Antibiotic   [] Other:   Rationale:  Additional orders considered but not ordered:  The following testing was considered but ultimately not selected after discussion with patient/family:***  ED Course:    ED Course as of 01/25/25 1614   Sat Jan 25, 2025   1509 XR Chest 2 View  I personally reviewed the images of the chest.  On my interpretation there is no lobar infiltrate or pneumothorax visualized. [RS]      ED Course User Index  [RS] Christoph Heaton MD            DIAGNOSIS  Final diagnoses:   None       DISPOSITION    ED Disposition       None            Please note that portions of this document were completed with voice recognition software.       Medical Decision Making  Kiara LaNese Jackson is a 24 y.o. female who presents to the ED c/o flu like symptoms.  Patient complains of cough, congestion.  She reports runny nose, body aches, fatigue.  She explains that she was seen yesterday at the urgent treatment center was negative for COVID and flu.  She reports that she continues to cough and she continues to get worse.  She advises they did not do a chest x-ray.      Problems Addressed:  Influenza A: complicated acute illness or injury     Details: Pt is positive for influenza A.      Amount and/or Complexity of Data Reviewed  Labs: ordered. Decision-making details documented in ED Course.  Radiology: ordered. Decision-making details documented in ED Course.  ECG/medicine tests: ordered.    Risk  Prescription drug management.        Discussion below represents my analysis of pertinent findings related to patient's condition, differential diagnosis, treatment plan and final disposition.    Differential diagnosis: Influenza, COVID, pneumonia, URI  Additional differential diagnosis include but are not limited to:     Additional sources  Discussed/ obtained information from independent historians:   [] Spouse  [] Parent  [] Family member  [] Friend  [] EMS   [] Other:  External (non-ED) record review:   [] Inpatient record:   [] Office record:   [] Outpatient record:   [x] Prior Outpatient labs:   [x] Prior Outpatient radiology:   [] Primary Care record:   [] Outside ED record:   [] Other:   Patient's care impacted by:   [] Diabetes  [] Hypertension  [] Hyperlipidemia  [] Hypothyroidism   [] Coronary Artery Disease  [] Congestive Heart Failure   [] COPD   [] Cancer   [] Obesity  [] GERD   [] Tobacco Abuse   [] Substance Abuse    [] Anxiety   [] Depression   [] Other:   Care significantly affected by Social Determinants of Health (housing and economic circumstances, unemployment)    [] Yes     [x] No   If yes, Patient's care significantly limited by  Social  Determinants of Health including:   [] Inadequate housing   [] Low income   [] Alcoholism and drug addiction in family   [] Problems related to primary support group   [] Unemployment   [] Problems related to employment   [] Other Social Determinants of Health:   Shared decision making:  Shared decision making with patient chest x-ray was performed, upper respiratory panel performed.  Patient is positive for influenza A.  Chest x-ray is interpreted as negative for acute findings.  We discussed supportive care.  Patient to go home and increase fluids as tolerated.  Increase rest.  Orders placed during this visit:  Orders Placed This Encounter   Procedures    COVID PRE-OP / PRE-PROCEDURE SCREENING ORDER (NO ISOLATION) - Swab, Nasopharynx    Respiratory Panel PCR w/COVID-19(SARS-CoV-2) TYRA/ANTWON/KELSEY/PAD/COR/PAULO In-House, NP Swab in UTM/VTM, 2 HR TAT - Swab, Nasopharynx    XR Chest 2 View    ECG 12 Lead Dyspnea       I considered prescription management  with:   [] Pain medication  [] Antiviral  [] Antibiotic   [] Other:   Rationale:  Additional orders considered but not ordered:  The following testing was considered but ultimately not selected after discussion with patient/family:  ED Course:    ED Course as of 01/30/25 1835   Sat Jan 25, 2025   1509 XR Chest 2 View  I personally reviewed the images of the chest.  On my interpretation there is no lobar infiltrate or pneumothorax visualized. [RS]   1624 Influenza A H3(!): Detected  Viral panel positive for influenza A. [RS]   1624 Influenza A H3(!): Detected [KG]      ED Course User Index  [KG] Rosina Simon, APRN  [RS] Christoph Heaton MD            DIAGNOSIS  Final diagnoses:   Influenza A       DISPOSITION    DISCHARGE    Patient discharged in stable condition.    Reviewed implications of results, diagnosis, meds, responsibility to follow up, warning signs and symptoms of possible worsening, potential complications and reasons to return to  ER.    Patient/Family voiced understanding of above instructions.    Discussed plan for discharge, as there is no emergent indication for admission.  Pt/family is agreeable and understands need for follow up and possible repeat testing.  Pt/family is aware that discharge does not mean that nothing is wrong but that it indicates no emergency is currently present that requires admission and they must continue care with follow-up as given below or with a physician of their choice.     FOLLOW-UP  Ahsan Pedro MD  [address] [facility] [address]  [place] [id number] [phone]               Medication List        New Prescriptions      methylPREDNISolone 4 MG dose pack  Commonly known as: MEDROL  Take as directed on package instructions.     promethazine-dextromethorphan 6.25-15 MG/5ML syrup  Commonly known as: PROMETHAZINE-DM  Take 5 mL by mouth 4 (Four) Times a Day As Needed for Cough for up to 5 days.               Where to Get Your Medications        These medications were sent to [id number] - [place], [place] - [address] [place] [address]  AT [facility] - [phone]  - [phone]   [address] [facility] , [facility] [address]      Phone: [phone]   methylPREDNISolone 4 MG dose pack  promethazine-dextromethorphan 6.25-15 MG/5ML syrup          ED Disposition       ED Disposition   Discharge    Condition   Stable    Comment   --               Please note that portions of this document were completed with voice recognition software.       Rosina Simon, APRN  01/30/25 [id number]

## 2025-03-18 ENCOUNTER — TELEPHONE (OUTPATIENT)
Dept: OBSTETRICS AND GYNECOLOGY | Facility: CLINIC | Age: 25
End: 2025-03-18
Payer: COMMERCIAL

## 2025-03-18 ENCOUNTER — HOSPITAL ENCOUNTER (EMERGENCY)
Facility: HOSPITAL | Age: 25
Discharge: HOME | End: 2025-03-18
Attending: EMERGENCY MEDICINE | Admitting: EMERGENCY MEDICINE
Payer: COMMERCIAL

## 2025-03-18 VITALS
WEIGHT: 152 LBS | HEIGHT: 62 IN | BODY MASS INDEX: 27.97 KG/M2 | HEART RATE: 44 BPM | TEMPERATURE: 98.7 F | SYSTOLIC BLOOD PRESSURE: 116 MMHG | DIASTOLIC BLOOD PRESSURE: 82 MMHG | RESPIRATION RATE: 14 BRPM | OXYGEN SATURATION: 99 %

## 2025-03-18 DIAGNOSIS — N93.9 ABNORMAL VAGINAL BLEEDING: Primary | ICD-10-CM

## 2025-03-18 DIAGNOSIS — R10.30 LOWER ABDOMINAL PAIN: ICD-10-CM

## 2025-03-18 DIAGNOSIS — N93.9 ABNORMAL UTERINE BLEEDING (AUB): Primary | ICD-10-CM

## 2025-03-18 DIAGNOSIS — N30.01 ACUTE CYSTITIS WITH HEMATURIA: ICD-10-CM

## 2025-03-18 LAB
ALBUMIN SERPL-MCNC: 4 G/DL (ref 3.5–5.2)
ALBUMIN/GLOB SERPL: 1.5 G/DL
ALP SERPL-CCNC: 52 U/L (ref 39–117)
ALT SERPL W P-5'-P-CCNC: 12 U/L (ref 1–33)
ANION GAP SERPL CALCULATED.3IONS-SCNC: 12 MMOL/L (ref 5–15)
AST SERPL-CCNC: 20 U/L (ref 1–32)
B-HCG UR QL: NEGATIVE
BACTERIA UR QL AUTO: ABNORMAL /HPF
BASOPHILS # BLD AUTO: 0.06 10*3/MM3 (ref 0–0.2)
BASOPHILS NFR BLD AUTO: 0.9 % (ref 0–1.5)
BILIRUB SERPL-MCNC: 0.3 MG/DL (ref 0–1.2)
BILIRUB UR QL STRIP: ABNORMAL
BUN SERPL-MCNC: 8 MG/DL (ref 6–20)
BUN/CREAT SERPL: 11.4 (ref 7–25)
CALCIUM SPEC-SCNC: 8.6 MG/DL (ref 8.6–10.5)
CHLORIDE SERPL-SCNC: 106 MMOL/L (ref 98–107)
CLARITY UR: ABNORMAL
CO2 SERPL-SCNC: 22 MMOL/L (ref 22–29)
COLOR UR: ABNORMAL
CREAT SERPL-MCNC: 0.7 MG/DL (ref 0.57–1)
DEPRECATED RDW RBC AUTO: 43.2 FL (ref 37–54)
EGFRCR SERPLBLD CKD-EPI 2021: 124 ML/MIN/1.73
EOSINOPHIL # BLD AUTO: 0.07 10*3/MM3 (ref 0–0.4)
EOSINOPHIL NFR BLD AUTO: 1 % (ref 0.3–6.2)
ERYTHROCYTE [DISTWIDTH] IN BLOOD BY AUTOMATED COUNT: 13.8 % (ref 12.3–15.4)
EXPIRATION DATE: NORMAL
GLOBULIN UR ELPH-MCNC: 2.6 GM/DL
GLUCOSE SERPL-MCNC: 69 MG/DL (ref 65–99)
GLUCOSE UR STRIP-MCNC: NEGATIVE MG/DL
HCT VFR BLD AUTO: 39.6 % (ref 34–46.6)
HGB BLD-MCNC: 12.5 G/DL (ref 12–15.9)
HGB UR QL STRIP.AUTO: ABNORMAL
HOLD SPECIMEN: NORMAL
HYALINE CASTS UR QL AUTO: ABNORMAL /LPF
IMM GRANULOCYTES # BLD AUTO: 0.03 10*3/MM3 (ref 0–0.05)
IMM GRANULOCYTES NFR BLD AUTO: 0.4 % (ref 0–0.5)
INTERNAL NEGATIVE CONTROL: NEGATIVE
INTERNAL POSITIVE CONTROL: POSITIVE
KETONES UR QL STRIP: NEGATIVE
LEUKOCYTE ESTERASE UR QL STRIP.AUTO: ABNORMAL
LIPASE SERPL-CCNC: 23 U/L (ref 13–60)
LYMPHOCYTES # BLD AUTO: 1.51 10*3/MM3 (ref 0.7–3.1)
LYMPHOCYTES NFR BLD AUTO: 22.1 % (ref 19.6–45.3)
Lab: NORMAL
MCH RBC QN AUTO: 27.1 PG (ref 26.6–33)
MCHC RBC AUTO-ENTMCNC: 31.6 G/DL (ref 31.5–35.7)
MCV RBC AUTO: 85.9 FL (ref 79–97)
MONOCYTES # BLD AUTO: 0.33 10*3/MM3 (ref 0.1–0.9)
MONOCYTES NFR BLD AUTO: 4.8 % (ref 5–12)
NEUTROPHILS NFR BLD AUTO: 4.82 10*3/MM3 (ref 1.7–7)
NEUTROPHILS NFR BLD AUTO: 70.8 % (ref 42.7–76)
NITRITE UR QL STRIP: POSITIVE
NRBC BLD AUTO-RTO: 0 /100 WBC (ref 0–0.2)
PH UR STRIP.AUTO: <=5 [PH] (ref 5–8)
PLATELET # BLD AUTO: 214 10*3/MM3 (ref 140–450)
PMV BLD AUTO: 11.7 FL (ref 6–12)
POTASSIUM SERPL-SCNC: 4 MMOL/L (ref 3.5–5.2)
PROT SERPL-MCNC: 6.6 G/DL (ref 6–8.5)
PROT UR QL STRIP: ABNORMAL
RBC # BLD AUTO: 4.61 10*6/MM3 (ref 3.77–5.28)
RBC # UR STRIP: ABNORMAL /HPF
REF LAB TEST METHOD: ABNORMAL
SODIUM SERPL-SCNC: 140 MMOL/L (ref 136–145)
SP GR UR STRIP: 1.03 (ref 1–1.03)
SQUAMOUS #/AREA URNS HPF: ABNORMAL /HPF
UROBILINOGEN UR QL STRIP: ABNORMAL
WBC # UR STRIP: ABNORMAL /HPF
WBC NRBC COR # BLD AUTO: 6.82 10*3/MM3 (ref 3.4–10.8)
WHOLE BLOOD HOLD COAG: NORMAL
WHOLE BLOOD HOLD SPECIMEN: NORMAL

## 2025-03-18 PROCEDURE — 83690 ASSAY OF LIPASE: CPT | Performed by: PHYSICIAN ASSISTANT

## 2025-03-18 PROCEDURE — 96374 THER/PROPH/DIAG INJ IV PUSH: CPT

## 2025-03-18 PROCEDURE — 96375 TX/PRO/DX INJ NEW DRUG ADDON: CPT

## 2025-03-18 PROCEDURE — 99283 EMERGENCY DEPT VISIT LOW MDM: CPT

## 2025-03-18 PROCEDURE — 81025 URINE PREGNANCY TEST: CPT | Performed by: PHYSICIAN ASSISTANT

## 2025-03-18 PROCEDURE — 81001 URINALYSIS AUTO W/SCOPE: CPT | Performed by: PHYSICIAN ASSISTANT

## 2025-03-18 PROCEDURE — 80053 COMPREHEN METABOLIC PANEL: CPT | Performed by: PHYSICIAN ASSISTANT

## 2025-03-18 PROCEDURE — 25010000002 KETOROLAC TROMETHAMINE PER 15 MG: Performed by: PHYSICIAN ASSISTANT

## 2025-03-18 PROCEDURE — 87086 URINE CULTURE/COLONY COUNT: CPT | Performed by: PHYSICIAN ASSISTANT

## 2025-03-18 PROCEDURE — 85025 COMPLETE CBC W/AUTO DIFF WBC: CPT | Performed by: PHYSICIAN ASSISTANT

## 2025-03-18 PROCEDURE — 25010000002 ONDANSETRON PER 1 MG: Performed by: PHYSICIAN ASSISTANT

## 2025-03-18 RX ORDER — HYDROCODONE BITARTRATE AND ACETAMINOPHEN 5; 325 MG/1; MG/1
1 TABLET ORAL ONCE
Refills: 0 | Status: DISCONTINUED | OUTPATIENT
Start: 2025-03-18 | End: 2025-03-18

## 2025-03-18 RX ORDER — CEFUROXIME AXETIL 500 MG/1
500 TABLET ORAL 2 TIMES DAILY
Qty: 20 TABLET | Refills: 0 | Status: SHIPPED | OUTPATIENT
Start: 2025-03-18 | End: 2025-03-28

## 2025-03-18 RX ORDER — ONDANSETRON 2 MG/ML
4 INJECTION INTRAMUSCULAR; INTRAVENOUS ONCE
Status: COMPLETED | OUTPATIENT
Start: 2025-03-18 | End: 2025-03-18

## 2025-03-18 RX ORDER — ONDANSETRON 4 MG/1
4 TABLET, ORALLY DISINTEGRATING ORAL EVERY 6 HOURS PRN
Qty: 15 TABLET | Refills: 0 | Status: SHIPPED | OUTPATIENT
Start: 2025-03-18

## 2025-03-18 RX ORDER — SODIUM CHLORIDE 0.9 % (FLUSH) 0.9 %
10 SYRINGE (ML) INJECTION AS NEEDED
Status: DISCONTINUED | OUTPATIENT
Start: 2025-03-18 | End: 2025-03-18 | Stop reason: HOSPADM

## 2025-03-18 RX ORDER — KETOROLAC TROMETHAMINE 15 MG/ML
15 INJECTION, SOLUTION INTRAMUSCULAR; INTRAVENOUS ONCE
Status: COMPLETED | OUTPATIENT
Start: 2025-03-18 | End: 2025-03-18

## 2025-03-18 RX ORDER — HYDROCODONE BITARTRATE AND ACETAMINOPHEN 5; 325 MG/1; MG/1
1 TABLET ORAL ONCE
Refills: 0 | Status: COMPLETED | OUTPATIENT
Start: 2025-03-18 | End: 2025-03-18

## 2025-03-18 RX ADMIN — HYDROCODONE BITARTRATE AND ACETAMINOPHEN 1 TABLET: 5; 325 TABLET ORAL at 15:01

## 2025-03-18 RX ADMIN — ONDANSETRON 4 MG: 2 INJECTION INTRAMUSCULAR; INTRAVENOUS at 13:20

## 2025-03-18 RX ADMIN — KETOROLAC TROMETHAMINE 15 MG: 15 INJECTION, SOLUTION INTRAMUSCULAR; INTRAVENOUS at 13:21

## 2025-03-18 NOTE — TELEPHONE ENCOUNTER
[facility] staff attempted to follow warm transfer process and was unsuccessful     Caller: Jackson, Kiara LaNese    Relationship to patient: Self    Best call back number: [phone] CALL ANYTIME.    Patient is needing: [facility] UNABLE TO ADDEND TODAY'S TELEPHONE ENCOUNTER. PATIENT CALLED BACK AFTER BEING DISCHARGED FROM [facility] EMERGENCY ROOM. ED VISIT UNDER ENCOUNTERS BUT NO NOTES IN CHART.     PATIENT STATED BLEEDING HAS SLOWED DOWN. HAS NOT CHANGED MORE THAN ONE TAMPON PER HOUR SINCE THIS MORNING. DOES HAVE SOME PAIN AND CRAMPING. PATIENT STATES ULTRASOUND WAS NOT PERFORMED AT EMERGENCY ROOM.

## 2025-03-18 NOTE — ED PROVIDER NOTES
Subjective   History of Present Illness  Pt is a 24 yo female presenting to ED with complaints of abdominal pain. PMHx significant for Anxiety, Depression, Fibromyalgia and Ovarian cysts. Pt complains of vaginal bleeding that started this morning and has been heavier than normal. She reports soaking through multiple tampons for about 2 hours but that bleeding has slowed down. She has lower abdominal pain and has vomited as well. She reports she often has pain and vomiting with her menstrual cycles. She has had heavy cycles in the past. She denies fever, diarrhea or urinary sx. She denies prior abdominal surgical hx. Denies recent rough intercourse or using toys. No vaginal injury. She took Motrin around 7 am. Denies tobacco or ETOH use.     History provided by:  Patient and medical records      Review of Systems   Constitutional:  Negative for fever.   Respiratory:  Negative for cough and shortness of breath.    Cardiovascular:  Negative for chest pain.   Gastrointestinal:  Positive for abdominal pain, nausea and vomiting. Negative for constipation and diarrhea.   Genitourinary:  Positive for vaginal bleeding. Negative for difficulty urinating, dysuria, flank pain and frequency.   Musculoskeletal:  Negative for back pain.   Neurological:  Negative for dizziness, syncope, weakness and headaches.       Past Medical History:   Diagnosis Date    Anxiety     Depression     Fibromyalgia     Generalized anxiety disorder 08/30/2021    With panic attacks    Headache     Irregular menses     Menorrhagia     Ovarian cyst     Vitamin D deficiency 08/30/2021 6/2021: 25 OH Vit D 10.2.       No Known Allergies    Past Surgical History:   Procedure Laterality Date    COLONOSCOPY      DENTAL PROCEDURE      ENDOSCOPY      NO PAST SURGERIES         Family History   Problem Relation Age of Onset    Cancer Father         unknown type    Cancer Maternal Grandmother         unknown type    Hypertension Other        Social History      Socioeconomic History    Marital status: Single   Tobacco Use    Smoking status: Never    Smokeless tobacco: Never   Vaping Use    Vaping status: Never Used   Substance and Sexual Activity    Alcohol use: Never     Comment: Socially, less than once a months     Drug use: Never     Types: Marijuana    Sexual activity: Yes     Partners: Male     Birth control/protection: None           Objective   Physical Exam  Vitals and nursing note reviewed.   Constitutional:       Appearance: She is well-developed.   HENT:      Head: Atraumatic.      Nose: Nose normal.   Eyes:      General: Lids are normal.      Conjunctiva/sclera: Conjunctivae normal.      Pupils: Pupils are equal, round, and reactive to light.   Cardiovascular:      Rate and Rhythm: Regular rhythm. Bradycardia present.      Heart sounds: Normal heart sounds.   Pulmonary:      Effort: Pulmonary effort is normal.      Breath sounds: Normal breath sounds. No wheezing.   Abdominal:      General: There is no distension.      Palpations: Abdomen is soft.      Tenderness: There is abdominal tenderness in the suprapubic area. There is no right CVA tenderness, left CVA tenderness, guarding or rebound.   Musculoskeletal:         General: No tenderness. Normal range of motion.      Cervical back: Normal range of motion and neck supple.   Skin:     General: Skin is warm and dry.      Findings: No erythema or rash.   Neurological:      Mental Status: She is alert and oriented to person, place, and time.      Sensory: No sensory deficit.   Psychiatric:         Speech: Speech normal.         Behavior: Behavior normal.         Procedures           ED Course  ED Course as of 03/18/25 2121   Tue Mar 18, 2025   1329  I personally reviewed and interpreted labs results and went over with patient. [RT]   1329 HCG, Urine QL: Negative [RT]   1414 WBC: 6.82 [RT]   1414 Hemoglobin: 12.5 [RT]   1414 Hematocrit: 39.6  Stable H and H [RT]   1414 Glucose: 69 [RT]   1415 Creatinine: 0.70  [RT]   1415 Leukocytes, UA(!): Moderate (2+) [RT]   1415 Nitrite, UA(!): Positive [RT]   1415 WBC, UA(!): 6-10 [RT]   1415 Bacteria, UA(!): 1+  UA somewhat contaminated but with her pain will culture and cover with antibiotics.  [RT]   1444 Discussed results and tx plan with patient. Will dc home on Ceftin and have her f/u with PCP and OB. Abdominal pain has improved and no excessive bleeding in ED.  [RT]      ED Course User Index  [RT] Madai Collins, PA        Recent Results (from the past 24 hours)   Comprehensive Metabolic Panel    Collection Time: 03/18/25  1:18 PM    Specimen: Blood   Result Value Ref Range    Glucose 69 65 - 99 mg/dL    BUN 8 6 - 20 mg/dL    Creatinine 0.70 0.57 - 1.00 mg/dL    Sodium 140 136 - 145 mmol/L    Potassium 4.0 3.5 - 5.2 mmol/L    Chloride 106 98 - 107 mmol/L    CO2 22.0 22.0 - 29.0 mmol/L    Calcium 8.6 8.6 - 10.5 mg/dL    Total Protein 6.6 6.0 - 8.5 g/dL    Albumin 4.0 3.5 - 5.2 g/dL    ALT (SGPT) 12 1 - 33 U/L    AST (SGOT) 20 1 - 32 U/L    Alkaline Phosphatase 52 39 - 117 U/L    Total Bilirubin 0.3 0.0 - 1.2 mg/dL    Globulin 2.6 gm/dL    A/G Ratio 1.5 g/dL    BUN/Creatinine Ratio 11.4 7.0 - 25.0    Anion Gap 12.0 5.0 - 15.0 mmol/L    eGFR 124.0 >60.0 mL/min/1.73   Lipase    Collection Time: 03/18/25  1:18 PM    Specimen: Blood   Result Value Ref Range    Lipase 23 13 - 60 U/L   Urinalysis With Microscopic If Indicated (No Culture) - Urine, Clean Catch    Collection Time: 03/18/25  1:18 PM    Specimen: Urine, Clean Catch   Result Value Ref Range    Color, UA Red (A) Yellow, Straw    Appearance, UA Turbid (A) Clear    pH, UA <=5.0 5.0 - 8.0    Specific Gravity, UA 1.027 1.001 - 1.030    Glucose, UA Negative Negative    Ketones, UA Negative Negative    Bilirubin, UA Moderate (2+) (A) Negative    Blood, UA Large (3+) (A) Negative    Protein, UA >=300 mg/dL (3+) (A) Negative    Leuk Esterase, UA Moderate (2+) (A) Negative    Nitrite, UA Positive (A) Negative    Urobilinogen, UA  0.2 E.U./dL 0.2 - 1.0 E.U./dL   CBC Auto Differential    Collection Time: 03/18/25  1:18 PM    Specimen: Blood   Result Value Ref Range    WBC 6.82 3.40 - 10.80 10*3/mm3    RBC 4.61 3.77 - 5.28 10*6/mm3    Hemoglobin 12.5 12.0 - 15.9 g/dL    Hematocrit 39.6 34.0 - 46.6 %    MCV 85.9 79.0 - 97.0 fL    MCH 27.1 26.6 - 33.0 pg    MCHC 31.6 31.5 - 35.7 g/dL    RDW 13.8 12.3 - 15.4 %    RDW-SD 43.2 37.0 - 54.0 fl    MPV 11.7 6.0 - 12.0 fL    Platelets 214 140 - 450 10*3/mm3    Neutrophil % 70.8 42.7 - 76.0 %    Lymphocyte % 22.1 19.6 - 45.3 %    Monocyte % 4.8 (L) 5.0 - 12.0 %    Eosinophil % 1.0 0.3 - 6.2 %    Basophil % 0.9 0.0 - 1.5 %    Immature Grans % 0.4 0.0 - 0.5 %    Neutrophils, Absolute 4.82 1.70 - 7.00 10*3/mm3    Lymphocytes, Absolute 1.51 0.70 - 3.10 10*3/mm3    Monocytes, Absolute 0.33 0.10 - 0.90 10*3/mm3    Eosinophils, Absolute 0.07 0.00 - 0.40 10*3/mm3    Basophils, Absolute 0.06 0.00 - 0.20 10*3/mm3    Immature Grans, Absolute 0.03 0.00 - 0.05 10*3/mm3    nRBC 0.0 0.0 - 0.2 /100 WBC   Green Top (Gel)    Collection Time: 03/18/25  1:18 PM   Result Value Ref Range    Extra Tube Hold for add-ons.    Lavender Top    Collection Time: 03/18/25  1:18 PM   Result Value Ref Range    Extra Tube hold for add-on    Gold Top - SST    Collection Time: 03/18/25  1:18 PM   Result Value Ref Range    Extra Tube Hold for add-ons.    Gray Top    Collection Time: 03/18/25  1:18 PM   Result Value Ref Range    Extra Tube Hold for add-ons.    Light Blue Top    Collection Time: 03/18/25  1:18 PM   Result Value Ref Range    Extra Tube Hold for add-ons.    Urinalysis, Microscopic Only - Urine, Clean Catch    Collection Time: 03/18/25  1:18 PM    Specimen: Urine, Clean Catch   Result Value Ref Range    RBC, UA Too Numerous to Count (A) None Seen, 0-2 /HPF    WBC, UA 6-10 (A) None Seen, 0-2 /HPF    Bacteria, UA 1+ (A) None Seen, Trace /HPF    Squamous Epithelial Cells, UA 3-6 (A) None Seen, 0-2 /HPF    Hyaline Casts, UA 0-6 0 - 6  "/LPF    Methodology Manual Light Microscopy    POC Urine Pregnancy    Collection Time: 03/18/25  1:25 PM    Specimen: Urine   Result Value Ref Range    HCG, Urine, QL Negative     Lot Number [id number]     Internal Positive Control Positive     Internal Negative Control Negative     Expiration Date 05/27/2026      Note: In addition to lab results from this visit, the labs listed above may include labs taken at another facility or during a different encounter within the last 24 hours. Please correlate lab times with ED admission and discharge times for further clarification of the services performed during this visit.    No orders to display     Vitals:    03/18/25 1230 03/18/25 1513   BP: 121/78 116/82   Pulse: 105 (!) 44   Resp: 18 14   Temp: 98.7 °F (37.1 °C)    SpO2: 98% 99%   Weight: 68.9 kg (152 lb)    Height: 157.5 cm (62\")      Medications   ondansetron (ZOFRAN) injection 4 mg (4 mg Intravenous Given 3/18/25 1320)   ketorolac (TORADOL) injection 15 mg (15 mg Intravenous Given 3/18/25 1321)   HYDROcodone-acetaminophen (NORCO) 5-325 MG per tablet 1 tablet (1 tablet Oral Given 3/18/25 1501)     ECG/EMG Results (last 24 hours)       ** No results found for the last 24 hours. **          No orders to display       DISCHARGE    Patient discharged in stable condition.    Reviewed implications of results, diagnosis, meds, responsibility to follow up, warning signs and symptoms of possible worsening, potential complications and reasons to return to ER.    Patient/Family voiced understanding of above instructions.    Discussed plan for discharge, as there is no emergent indication for admission.  Pt/family is agreeable and understands need for follow up and possible repeat testing.  Pt/family is aware that discharge does not mean that nothing is wrong but that it indicates no emergency is currently present that requires admission and they must continue care with follow-up as given below or with a physician of their " choice.     FOLLOW-UP  Ahsan Pedro MD  [address] [facility]  [address]  [place] [address]  [phone]    Schedule an appointment as soon as possible for a visit       Jett Umanzor MD  [address] [facility]  [address]  [place] [address]  [phone]    Schedule an appointment as soon as possible for a visit       [facility] EMERGENCY DEPARTMENT  [address] [facility] [id number] [phone]    If symptoms worsen         Medication List        New Prescriptions      cefuroxime 500 MG tablet  Commonly known as: CEFTIN  Take 1 tablet by mouth 2 (Two) Times a Day for 10 days.     ondansetron ODT 4 MG disintegrating tablet  Commonly known as: ZOFRAN-ODT  Place 1 tablet on the tongue Every 6 (Six) Hours As Needed for Nausea or Vomiting for up to 15 doses.               Where to Get Your Medications        These medications were sent to [id number] - [place], [place] - [address] [facility]  AT [facility] - [phone]  - [phone]   [address] , [facility] [address]      Phone: [phone]   cefuroxime 500 MG tablet  ondansetron ODT 4 MG disintegrating tablet                                                      Medical Decision Making  Pt is a 24 yo female presenting to ED with complaints of abdominal pain and vaginal bleeding. I had a discussion with the patient / family regarding ED course, diagnosis, diagnostic results and treatment plan including medications and admission / discharge. Discussed if new or worse symptoms / concerns to return to ED for further evaluation. Discussed need for close follow up with PCP / specialists.     DDx  Pregnancy, UTI, Anemia, Endometriosis, Menorrhagia     Problems Addressed:  Abnormal vaginal bleeding: complicated acute illness or injury  Acute cystitis with hematuria: complicated acute illness or injury  Lower abdominal pain: complicated acute illness or injury    Amount  and/or Complexity of Data Reviewed  External Data Reviewed: notes.     Details: Reviewed previous non ED visits including prior labs, imaging, available notes, medications, allergies and surgical hx.   2-19-25 PCP for URI  Labs: ordered. Decision-making details documented in ED Course.    Risk  Prescription drug management.        Final diagnoses:   Abnormal vaginal bleeding   Lower abdominal pain   Acute cystitis with hematuria       ED Disposition  ED Disposition       ED Disposition   Discharge    Condition   Stable    Comment   --               Ahsan Pedro MD  [address] [facility]  [address]  [place] [address]  [phone]    Schedule an appointment as soon as possible for a visit       Jett Umanzor MD  [address] [facility]  [address]  [place] [address]  [phone]    Schedule an appointment as soon as possible for a visit       [facility] EMERGENCY DEPARTMENT  [address] [place] [id number] [phone]    If symptoms worsen         Medication List        New Prescriptions      cefuroxime 500 MG tablet  Commonly known as: CEFTIN  Take 1 tablet by mouth 2 (Two) Times a Day for 10 days.     ondansetron ODT 4 MG disintegrating tablet  Commonly known as: ZOFRAN-ODT  Place 1 tablet on the tongue Every 6 (Six) Hours As Needed for Nausea or Vomiting for up to 15 doses.               Where to Get Your Medications        These medications were sent to [facility] #[id number] - [place], [place] - [address] [place] [address] [address] AT [facility] - [phone]  - [phone]   [address] [facility] , [facility] [address]      Phone: [phone]   cefuroxime 500 MG tablet  ondansetron ODT 4 MG disintegrating tablet            Madai Collins PA  03/18/25 2120

## 2025-03-18 NOTE — TELEPHONE ENCOUNTER
Went to ED. Patient reports bleeding has improved (still heavy but not saturating tampon) and still having blood clots (everton size). UPT neg. CCUA +Nitrites.Treated with Ceftin 500mg BID x 10 days for UTI. No imaging performed. US and appt scheduled tomorrow. ED for severe pain and or bleeding.

## 2025-03-18 NOTE — TELEPHONE ENCOUNTER
PT is calling because she has been bleeding through about 3 tampons in an hour with clots. PT states this has happened before but wanted to call in and see what we advise.

## 2025-03-18 NOTE — Clinical Note
[facility] EMERGENCY DEPARTMENT  [address]  [facility] [address]  Phone: [phone]    Lexy Ortiz was seen and treated in our emergency department on 3/18/2025.  She may return to work on 03/19/2025.         Thank you for choosing [facility].    Madai Collins, PA

## 2025-03-18 NOTE — TELEPHONE ENCOUNTER
Last visit 10/16/2023. Started bleeding this AM (she thinks likely menstrual cycle). When first put tampon in had to take it right out because it was saturated. Saturating through multiple tampson less than an hour w/ clots the size bigger than a everton. A few quarter size clots. Not on birth control. Reports being SA. Does not use condoms for contraception- . Denies lightheaded. Feels nauseous without energy. Patient instructed to have someone  her to ED ASAP for evaluation. Call us back after ED visit to let us know what is going on and then will schedule f/u appt and annual exam. Pt CAPRI.

## 2025-03-19 ENCOUNTER — OFFICE VISIT (OUTPATIENT)
Dept: OBSTETRICS AND GYNECOLOGY | Facility: CLINIC | Age: 25
End: 2025-03-19
Payer: COMMERCIAL

## 2025-03-19 VITALS
DIASTOLIC BLOOD PRESSURE: 74 MMHG | HEIGHT: 62 IN | SYSTOLIC BLOOD PRESSURE: 112 MMHG | BODY MASS INDEX: 28.01 KG/M2 | WEIGHT: 152.2 LBS

## 2025-03-19 DIAGNOSIS — Z01.419 ENCOUNTER FOR ANNUAL ROUTINE GYNECOLOGICAL EXAMINATION: Primary | ICD-10-CM

## 2025-03-19 NOTE — PROGRESS NOTES
Gynecologic Annual Exam Note        Annual Exam, AUB , and TVU today        Subjective     HPI  Kiara LaNese Jackson is a 25 y.o.  female who presents for annual well woman exam as a established patient. There were no changes to her medical or surgical history since her last visit.. Patient's last menstrual period was 2025. Her periods occur every 28-30 days, lasting 5-7 days.  The flow is moderate. She reports dysmenorrhea is mild occurring premenstrually and first 1-2 days of flow. Marital Status: .  She is sexually active. She has not had new partners.. STD testing recommendations have been explained to the patient and she does not desire STD testing.    The patient would like to discuss the following complaints today: patient states she went to the ED yesterday for heavy vaginal bleeding. She started her menses yesterday and was saturating 1 pad within 1 hour with vaginal blood clots that were about the size of a quarter. Patient states she has been evaluated for this approx. In 2019 and was put on medication to help her stop the heavy bleeding.     Additional OB/GYN History   contraceptive methods: None  Desires to: do not start contraception  Thromboembolic Disease: none  History of migraines: no  Age of menarche: 13    History of STD: yes GC/CHLAMYDIA     Last Pap : 2022. Results: negative. HPV: negative. STD testing: negative  Last Completed Pap Smear            Awaiting Completion       PAP SMEAR (Every 3 Years) Order placed this encounter      2025  Order placed for LIQUID-BASED PAP SMEAR WITH HPV GENOTYPING IF ASCUS ([facility]) by Nicole Sahu MA    2022  LIQUID-BASED PAP SMEAR, [facility] ([facility])                             History of abnormal Pap smear: no  Gardasil status:completed  Family history of uterine, colon, breast, or ovarian cancer: yes - Maternal Aunt had breast cancer  Performs monthly Self-Breast Exam: no  Exercises  Regularly:no  Feelings of Anxiety or Depression: no  Tobacco Usage?: No       Current Outpatient Medications:     albuterol sulfate  (90 Base) MCG/ACT inhaler, Inhale 2 puffs Every 4 (Four) Hours As Needed., Disp: , Rfl:     ibuprofen (ADVIL,MOTRIN) 600 MG tablet, Take 1 tablet by mouth Every 6 (Six) Hours As Needed for Mild Pain., Disp: 120 tablet, Rfl: 3    cefuroxime (CEFTIN) 500 MG tablet, Take 1 tablet by mouth 2 (Two) Times a Day for 10 days. (Patient not taking: Reported on 3/19/2025), Disp: 20 tablet, Rfl: 0    DULoxetine (CYMBALTA) 30 MG capsule, Take 1 capsule by mouth Daily. (Patient not taking: Reported on 3/19/2025), Disp: 30 capsule, Rfl: 5    methylPREDNISolone (MEDROL) 4 MG dose pack, Take as directed on package instructions. (Patient not taking: Reported on 3/19/2025), Disp: 21 tablet, Rfl: 0    ondansetron ODT (ZOFRAN-ODT) 4 MG disintegrating tablet, Place 1 tablet on the tongue Every 6 (Six) Hours As Needed for Nausea or Vomiting for up to 15 doses. (Patient not taking: Reported on 3/19/2025), Disp: 15 tablet, Rfl: 0    Prenatal Vit-Fe Fumarate-FA (Prenatal Vitamin) 27-0.8 MG tablet, Take 1 tablet/day by mouth Daily. (Patient not taking: Reported on 3/19/2025), Disp: 30 tablet, Rfl: 11  No current facility-administered medications for this visit.     Patient denies the need for medication refills today.    OB History          1    Para   1    Term   0       1    AB   0    Living   1         SAB   0    IAB   0    Ectopic   0    Molar   0    Multiple   0    Live Births   1                Health Maintenance   Topic Date Due    Annual Gynecologic Pelvic and Breast Exam  Never done    ANNUAL PHYSICAL  2021    BMI FOLLOWUP  2022    CHLAMYDIA SCREENING  2024    INFLUENZA VACCINE  2024    COVID-19 Vaccine (3 - 2024- season) 2024    PAP SMEAR  2025    TDAP/TD VACCINES (4 - Td or Tdap) 2032    HEPATITIS C SCREENING  Completed    MENINGOCOCCAL  "B VACCINE  Completed    HPV VACCINES  Completed    Pneumococcal Vaccine 0-49  Aged Out       Past Medical History:   Diagnosis Date    Anxiety     Chlamydia Had it once before    Depression     Fibromyalgia     Generalized anxiety disorder 08/30/2021    With panic attacks    Headache     Irregular menses     Menorrhagia     Ovarian cyst     Vitamin D deficiency 08/30/2021 6/2021: 25 [place] Vit D 10.2.        Past Surgical History:   Procedure Laterality Date    COLONOSCOPY      DENTAL PROCEDURE      ENDOSCOPY      NO PAST SURGERIES      WISDOM TOOTH EXTRACTION         The additional following portions of the patient's history were reviewed and updated as appropriate: allergies, current medications, past family history, past medical history, past social history, past surgical history, and problem list.    Review of Systems   Constitutional: Negative.    Respiratory: Negative.     Cardiovascular: Negative.    Gastrointestinal: Negative.    Genitourinary:  Positive for menstrual problem. Negative for breast discharge, breast lump, breast pain and pelvic pain.   Musculoskeletal: Negative.    Neurological: Negative.    Psychiatric/Behavioral: Negative.           I have reviewed and agree with the HPI, ROS, and historical information as entered above. Jett Umanzor MD          Objective   /74   Ht 157.5 cm (62.01\")   Wt 69 kg (152 lb 3.2 oz)   LMP 03/18/2025   BMI 27.83 kg/m²     Physical Exam  Vitals reviewed. Exam conducted with a chaperone present.   Constitutional:       Appearance: Normal appearance. She is normal weight.   HENT:      Head: Normocephalic and atraumatic.   Cardiovascular:      Rate and Rhythm: Normal rate.   Pulmonary:      Effort: Pulmonary effort is normal.      Breath sounds: Normal breath sounds.   Chest:   Breasts:     Right: Normal.      Left: Normal.   Abdominal:      General: Abdomen is flat. Bowel sounds are normal.      Palpations: Abdomen is soft.   Genitourinary:     " General: Normal vulva.      Vagina: Normal.      Cervix: Normal.      Uterus: Normal.       Adnexa: Right adnexa normal and left adnexa normal.   Musculoskeletal:      Cervical back: Neck supple.   Skin:     General: Skin is warm and dry.   Neurological:      Mental Status: She is alert and oriented to person, place, and time.   Psychiatric:         Mood and Affect: Mood normal.         Behavior: Behavior normal.            Assessment and Plan    Problem List Items Addressed This Visit    None  Visit Diagnoses         Encounter for annual routine gynecological examination    -  Primary    Relevant Orders    LIQUID-BASED PAP SMEAR WITH HPV GENOTYPING IF ASCUS ([facility])            GYN annual well woman exam.   Reviewed pap guidelines.   Encouraged use of condoms for STD prevention.  Reviewed monthly self breast exams.  Instructed to call with lumps, pain, or breast discharge.    Reviewed exercise as a preventative health measures.   Reccommended Flu Vaccine in Fall of each year.  RTC in 1 year or PRN with problems  Offered hormonal regulation of menstrual cycles and she declines at this time.   Return in about 1 year (around 3/19/2026) for Annual physical.    Jett Umanzor MD  03/19/2025

## 2025-03-20 LAB — BACTERIA SPEC AEROBE CULT: NORMAL

## 2025-03-21 LAB — REF LAB TEST METHOD: NORMAL

## 2025-05-05 ENCOUNTER — OFFICE VISIT (OUTPATIENT)
Dept: OBSTETRICS AND GYNECOLOGY | Facility: CLINIC | Age: 25
End: 2025-05-05
Payer: COMMERCIAL

## 2025-05-05 VITALS
SYSTOLIC BLOOD PRESSURE: 108 MMHG | HEIGHT: 62 IN | WEIGHT: 153 LBS | DIASTOLIC BLOOD PRESSURE: 64 MMHG | BODY MASS INDEX: 28.16 KG/M2

## 2025-05-05 DIAGNOSIS — N92.6 MISSED PERIOD: Primary | ICD-10-CM

## 2025-05-05 DIAGNOSIS — R87.615 ENCOUNTER FOR REPEAT PAP SMEAR DUE TO PREVIOUS INSUFF CERVICAL CELLS: ICD-10-CM

## 2025-05-05 LAB
B-HCG UR QL: NEGATIVE
EXPIRATION DATE: NORMAL
INTERNAL NEGATIVE CONTROL: NORMAL
INTERNAL POSITIVE CONTROL: NORMAL
Lab: NORMAL

## 2025-05-05 PROCEDURE — 81025 URINE PREGNANCY TEST: CPT

## 2025-05-05 NOTE — PROGRESS NOTES
"    Chief Complaint   Patient presents with    Follow-up           Kiara LaNese Jackson is a 25 y.o. year old  presenting to be seen for a follow up pap smear.     3/19/2025  \"SPECIMEN ADEQUACY:  UNSATISFACTORY FOR EVALUATION   Specimen processed and examined, but unsatisfactory   for evaluation of epithelial abnormality because of   obscuring blood. Specimen processed and examined, but unsatisfactory for evaluation of epithelial abnormality due to insufficient squamous cells\"        OTHER THINGS SHE WANTS TO DISCUSS TODAY:  Patient states she is unsure if she had a menstrual cylce in the month of April. She cannot remember.     Tobacco Usage?: No     The additional following portions of the patient's history were reviewed and updated as appropriate: allergies and current medications.    Review of Systems   Respiratory: Negative.  Negative for shortness of breath.    Cardiovascular: Negative.  Negative for chest pain.   Gastrointestinal: Negative.  Negative for abdominal distention and abdominal pain.   Genitourinary:  Positive for menstrual problem (Menorrhagia). Negative for dyspareunia, dysuria, pelvic pain, pelvic pressure, urgency, urinary incontinence, vaginal bleeding, vaginal discharge and vaginal pain.     All other systems reviewed and are negative.     I have reviewed and agree with the HPI, ROS, and historical information as entered above. LIZANDRO Nieves      Physical Exam  Vitals and nursing note reviewed. Exam conducted with a chaperone present.   Constitutional:       General: She is not in acute distress.     Appearance: Normal appearance. She is not ill-appearing or toxic-appearing.   HENT:      Head: Normocephalic and atraumatic.   Pulmonary:      Effort: Pulmonary effort is normal.   Abdominal:      Palpations: Abdomen is soft. There is no mass.      Tenderness: There is no abdominal tenderness.      Hernia: No hernia is present.   Neurological:      Mental Status: She is alert and " oriented to person, place, and time.   Psychiatric:         Mood and Affect: Mood normal.         Behavior: Behavior normal.         Lab Review   Last PAP on 03/19/2025 indicated non-diagnostic  with HPV  not done . Her evaluation in the past has not included a colposcopy.    Assessment and Plan    Problem List Items Addressed This Visit    None  Visit Diagnoses         Missed period    -  Primary    Relevant Orders    POC Pregnancy, Urine (Completed)      Encounter for repeat Pap smear due to previous insuff cervical cells                Repeat pap due to non diagnostic specimen.   UPT Negative.   Will notify patient with pap results.   Return for Annual physical or sooner if needed.      Reina Dowd, APRN  05/05/2025

## 2025-05-06 LAB — REF LAB TEST METHOD: NORMAL

## 2025-07-28 ENCOUNTER — OFFICE VISIT (OUTPATIENT)
Dept: INTERNAL MEDICINE | Facility: CLINIC | Age: 25
End: 2025-07-28
Payer: COMMERCIAL

## 2025-07-28 VITALS
HEIGHT: 62 IN | WEIGHT: 151.6 LBS | TEMPERATURE: 98.2 F | BODY MASS INDEX: 27.9 KG/M2 | SYSTOLIC BLOOD PRESSURE: 100 MMHG | DIASTOLIC BLOOD PRESSURE: 62 MMHG | HEART RATE: 101 BPM | OXYGEN SATURATION: 98 %

## 2025-07-28 DIAGNOSIS — R05.1 ACUTE COUGH: Primary | ICD-10-CM

## 2025-07-28 DIAGNOSIS — J06.9 UPPER RESPIRATORY TRACT INFECTION, UNSPECIFIED TYPE: ICD-10-CM

## 2025-07-28 PROCEDURE — 87428 SARSCOV & INF VIR A&B AG IA: CPT | Performed by: STUDENT IN AN ORGANIZED HEALTH CARE EDUCATION/TRAINING PROGRAM

## 2025-07-28 PROCEDURE — 99213 OFFICE O/P EST LOW 20 MIN: CPT | Performed by: STUDENT IN AN ORGANIZED HEALTH CARE EDUCATION/TRAINING PROGRAM

## 2025-07-28 RX ORDER — AZITHROMYCIN 250 MG/1
TABLET, FILM COATED ORAL
Qty: 6 TABLET | Refills: 0 | Status: SHIPPED | OUTPATIENT
Start: 2025-07-28

## 2025-07-28 RX ORDER — BENZONATATE 100 MG/1
100 CAPSULE ORAL 3 TIMES DAILY PRN
Qty: 30 CAPSULE | Refills: 0 | Status: SHIPPED | OUTPATIENT
Start: 2025-07-28 | End: 2025-08-07

## 2025-07-28 NOTE — PROGRESS NOTES
Office Note     Name: Kiara LaNese Jackson    : 2000     MRN: [id number]     Chief Complaint  Cough (Fatigue/) and Fibromyalgia (Might have flare up)    Subjective     History of Present Illness:  Kiara LaNese Jackson is a 25 y.o. female who presents today for cough and fibromyalgia.    History of Present Illness  The patient is presenting for cough and fibromyalgia.    She has been experiencing a worsening cough since Friday, accompanied by severe nausea. The nausea intensifies during coughing spells, leading to a sensation of impending vomiting or actual vomiting. She also reports excessive sneezing, ear pain, and a productive cough with mucus. She has not noticed any fevers. Her  has observed her wheezing during sleep and occasionally while awake. She does not have any known lung conditions such as asthma or COPD. 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    Occupations: Works at a  center    Review of Systems:   Review of Systems   Constitutional:  Positive for fatigue. Negative for fever.   HENT:  Positive for congestion, rhinorrhea and sinus pressure.    Respiratory:  Positive for cough. Negative for shortness of breath and wheezing.        Past Medical History:   Past Medical History:   Diagnosis Date    Anxiety     Chlamydia Had it once before    Depression     Fibromyalgia     Generalized anxiety disorder 2021    With panic attacks    Headache     Irregular menses     Menorrhagia     Ovarian cyst     Vitamin D deficiency 2021: 25 [place] Vit D 10.2.       Past Surgical History:   Past Surgical History:   Procedure Laterality Date    COLONOSCOPY      DENTAL PROCEDURE      ENDOSCOPY      NO PAST SURGERIES      WISDOM TOOTH EXTRACTION         Family History:   Family History   Problem Relation Age of Onset    Cancer Father         unknown type    Cancer Maternal Grandmother         unknown type    Hypertension Other         Social History:   Social History     Socioeconomic History    Marital status: Single   Tobacco Use    Smoking status: Never    Smokeless tobacco: Never   Vaping Use    Vaping status: Never Used   Substance and Sexual Activity    Alcohol use: Never     Comment: Socially, less than once a months     Drug use: Never     Types: Marijuana    Sexual activity: Yes     Partners: Male     Birth control/protection: None       Immunizations:   Immunization History   Administered Date(s) Administered    COVID-19 (PFIZER) Purple Cap Monovalent 06/01/2021, 06/22/2021    DTaP, Unspecified 2001, 2001, 2001, 05/22/2002, 02/20/2006    Fluzone (or Fluarix & Flulaval for VFC) >6mos 10/28/2021, 10/28/2022    HPV, Unspecified 07/22/2013, 09/25/2013, 01/27/2014    Hep A, 2 Dose 04/23/2010, 02/10/2012    Hep B, Adolescent or Pediatric 2001, 2001, 2001    Hib (PRP-T) 2001, 2001, 2001, 2001    MMR 02/11/2002, 02/20/2006    Meningococcal B,(Bexsero) 04/12/2019, 09/06/2019    Meningococcal Conjugate 02/10/2012, 09/05/2017    PEDS-Pneumococcal Conjugate (PCV7) 2001, 2001, 2001    Polio, Unspecified 2001, 2001, 2001, 02/20/2006    Td, Not Adsorbed 09/06/2019    Tdap 02/10/2012, 12/13/2022    Varicella 05/22/2002, 04/23/2010        Medications:     Current Outpatient Medications:     albuterol sulfate  (90 Base) MCG/ACT inhaler, Inhale 2 puffs Every 4 (Four) Hours As Needed., Disp: , Rfl:     DULoxetine (CYMBALTA) 30 MG capsule, Take 1 capsule by mouth Daily., Disp: 30 capsule, Rfl: 5    ibuprofen (ADVIL,MOTRIN) 600 MG tablet, Take 1 tablet by mouth Every 6 (Six) Hours As Needed for Mild Pain., Disp: 120 tablet, Rfl: 3    azithromycin (Zithromax Z-Javed) 250 MG tablet, Take 2 tablets by mouth on day 1, then 1 tablet daily on days 2-5, Disp: 6 tablet, Rfl: 0    benzonatate (Tessalon Perles) 100 MG capsule, Take 1 capsule by mouth 3 (Three)  "Times a Day As Needed for Cough for up to 10 days., Disp: 30 capsule, Rfl: 0    Allergies:   No Known Allergies    Objective     Vital Signs  /62 (BP Location: Right arm, Patient Position: Sitting, Cuff Size: Adult)   Pulse 101   Temp 98.2 °F (36.8 °C)   Ht 157.5 cm (62.01\")   Wt 68.8 kg (151 lb 9.6 oz)   SpO2 98%   BMI 27.72 kg/m²   Body mass index is 27.72 kg/m².            Physical Exam  Constitutional:       Appearance: Normal appearance.   HENT:      Head: Normocephalic and atraumatic.   Eyes:      Extraocular Movements: Extraocular movements intact.      Conjunctiva/sclera: Conjunctivae normal.   Pulmonary:      Effort: Pulmonary effort is normal. No respiratory distress.      Breath sounds: Normal breath sounds.   Neurological:      General: No focal deficit present.      Mental Status: She is alert and oriented to person, place, and time.   Psychiatric:         Mood and Affect: Mood normal.         Behavior: Behavior normal.            Results:  Recent Results (from the past 24 hours)   POCT SARS-CoV-2 + Flu Antigen CHANA    Collection Time: 07/28/25  3:45 PM    Specimen: Swab   Result Value Ref Range    SARS Antigen Not Detected Not Detected, Presumptive Negative    Influenza A Antigen CHANA Not Detected Not Detected    Influenza B Antigen CHANA Not Detected Not Detected    Internal Control Passed Passed    Lot Number [id number]     Expiration Date 03/05/2026         Assessment and Plan     Assessment/Plan:  Diagnoses and all orders for this visit:    1. Acute cough (Primary)  -     POCT SARS-CoV-2 + Flu Antigen CHANA    2. Upper respiratory tract infection, unspecified type  - Symptoms include cough, congestion, sneezing, runny nose, postnasal drip since 07/25/2025.  - Physical examination reveals clear lung sounds; no fever reported.  - Discussed that symptoms are most likely due to an acute viral URI, will prescribe antibiotic to be taken only if symptoms have not improved after 7 to 10 days of " supportive care.  - Patient was tested for COVID and flu and results were negative.  - Advised patient on supportive therapies, including over-the-counter acetaminophen or ibuprofen for fever and bodyaches, maintaining adequate hydration.    - Will prescribe Tessalon Perles as needed cough    Recommendations regarding symptom relief:  For congestion could trial OTC flonase, saline nasal sprays, nasal lavage.  For cough can try OTC dextromethorphan, guaifenesin, benzonatate  For sore throat recommend NSAIDs, salt water gargles.   -     azithromycin (Zithromax Z-Javed) 250 MG tablet; Take 2 tablets by mouth on day 1, then 1 tablet daily on days 2-5  Dispense: 6 tablet; Refill: 0  -     benzonatate (Tessalon Perles) 100 MG capsule; Take 1 capsule by mouth 3 (Three) Times a Day As Needed for Cough for up to 10 days.  Dispense: 30 capsule; Refill: 0      Follow Up  No follow-ups on file.    Patient or patient representative verbalized consent for the use of Ambient Listening during the visit with  Astrid Garcia MD for chart documentation. 7/28/2025  15:57 EDT      Astrid Garcia MD   [facility] [place] [id number]

## 2025-07-28 NOTE — LETTER
July 28, 2025     Patient: Kiara LaNese Jackson   YOB: 2000   Date of Visit: 7/28/2025       To Whom It May Concern:    Please excuse Lexy Ortiz from work on 7/28 as she was being seen clinic         Sincerely,        Astrid Garcia MD    CC: No Recipients